# Patient Record
Sex: FEMALE | Race: WHITE | NOT HISPANIC OR LATINO | Employment: FULL TIME | URBAN - NONMETROPOLITAN AREA
[De-identification: names, ages, dates, MRNs, and addresses within clinical notes are randomized per-mention and may not be internally consistent; named-entity substitution may affect disease eponyms.]

---

## 2017-02-19 ENCOUNTER — OFFICE VISIT (OUTPATIENT)
Dept: RETAIL CLINIC | Facility: CLINIC | Age: 67
End: 2017-02-19

## 2017-02-19 VITALS
SYSTOLIC BLOOD PRESSURE: 132 MMHG | WEIGHT: 179 LBS | TEMPERATURE: 99.3 F | HEART RATE: 73 BPM | OXYGEN SATURATION: 99 % | BODY MASS INDEX: 33.79 KG/M2 | DIASTOLIC BLOOD PRESSURE: 76 MMHG | HEIGHT: 61 IN

## 2017-02-19 DIAGNOSIS — R19.7 ACUTE DIARRHEA: Primary | ICD-10-CM

## 2017-02-19 PROCEDURE — 99203 OFFICE O/P NEW LOW 30 MIN: CPT | Performed by: NURSE PRACTITIONER

## 2017-02-19 RX ORDER — LOPERAMIDE HYDROCHLORIDE 2 MG/1
2 CAPSULE ORAL 4 TIMES DAILY PRN
Status: DISCONTINUED | OUTPATIENT
Start: 2017-02-19 | End: 2017-03-13

## 2017-02-19 NOTE — PROGRESS NOTES
"Subjective   Julieta Holt is a 66 y.o. female. Presents to the clinic today with a chief complaint of   Chief Complaint   Patient presents with   • Diarrhea        History of Present Illness     Ms Holt presents with history of diarrhea since Friday.  She reports 5-6 episodes of diarrhea in the last 12 hours.  Denies blood or mucous in her stools.  She denies constant stomach pain.  She reports heartburn and increase flatus.  She denies fever.  She has not taken any antibiotics recently.  She has not taken any medications for her symptoms.  She is eating and drinking normally.      She has a history of colitis however, she states that at her last colonoscopy the doctor indicated that there were no signs of the colitis.      See ROS    The following portions of the patient's history were reviewed and updated as appropriate: allergies, current medications, past family history, past medical history, past social history, past surgical history and problem list.    Review of Systems   Constitutional: Positive for fatigue. Negative for appetite change, chills, diaphoresis and fever.   HENT: Positive for congestion and sinus pressure. Negative for ear discharge, ear pain, hearing loss, postnasal drip, rhinorrhea, sore throat, trouble swallowing and voice change.    Eyes: Negative for redness and itching.   Respiratory: Negative for cough, chest tightness, shortness of breath and wheezing.    Gastrointestinal: Positive for diarrhea (5-6 episodes of diarrhea since midnight) and nausea. Negative for abdominal pain, blood in stool and vomiting.   Skin: Negative for rash.   Neurological: Negative for dizziness.       Objective   Allergies   Allergen Reactions   • Erythromycin      Blood pressure 132/76, pulse 73, temperature 99.3 °F (37.4 °C), height 61\" (154.9 cm), weight 179 lb (81.2 kg), SpO2 99 %.    Physical Exam  General Appearance: Well-appearing, well-developed, well hydrated, no acute distress, alert " and oriented  Mouth/Throat:  Posterior pharynx is pink without drainage or exudates; uvula rises midline; dentition is in good condition and repair and mucous membranes are moist  Neck:  Supple without lymphadenopathy; trachea is midline  Lungs:  Clear to auscultation bilaterally  Heart:   regular rhythm and murmur noted  Abdomen:   Soft, RUQ tenderness, bowel sounds hyperactive, no masses    Assessment/Plan   Julieta was seen today for diarrhea.    Diagnoses and all orders for this visit:    Acute diarrhea  -     loperamide (IMODIUM) capsule 2 mg; Take 1 capsule by mouth 4 (Four) Times a Day As Needed for diarrhea.    Signs and symptoms as well as physical exam findings were discussed with the patient.  Plan of care was completed and patient agreed with the treatment plan. Pt agreed that, if no better in 24 hours, she would contact her PCP for evaluation and treatment.    After visit summary was given.      Patient to follow up if symptoms worsen.               Arabella Castle APRN

## 2017-02-19 NOTE — PATIENT INSTRUCTIONS
Diarrhea  Diarrhea is watery poop (stool). It can make you feel weak, tired, thirsty, or give you a dry mouth (signs of dehydration). Watery poop is a sign of another problem, most often an infection. It often lasts 2-3 days. It can last longer if it is a sign of something serious. Take care of yourself as told by your doctor.  HOME CARE   · Drink 1 cup (8 ounces) of fluid each time you have watery poop.  · Do not drink the following fluids:    Those that contain simple sugars (fructose, glucose, galactose, lactose, sucrose, maltose).    Sports drinks.    Fruit juices.    Whole milk products.    Sodas.    Drinks with caffeine (coffee, tea, soda) or alcohol.  · Oral rehydration solution may be used if the doctor says it is okay. You may make your own solution. Follow this recipe:    ?-? teaspoon table salt.    ¾ teaspoon baking soda.    ? teaspoon salt substitute containing potassium chloride.    1 ? tablespoons sugar.    1 liter (34 ounces) of water.  · Avoid the following foods:    High fiber foods, such as raw fruits and vegetables.    Nuts, seeds, and whole grain breads and cereals.     Those that are sweetened with sugar alcohols (xylitol, sorbitol, mannitol).  · Try eating the following foods:    Starchy foods, such as rice, toast, pasta, low-sugar cereal, oatmeal, baked potatoes, crackers, and bagels.    Bananas.    Applesauce.  · Eat probiotic-rich foods, such as yogurt and milk products that are fermented.  · Wash your hands well after each time you have watery poop.  · Only take medicine as told by your doctor.  · Take a warm bath to help lessen burning or pain from having watery poop.  GET HELP RIGHT AWAY IF:   · You cannot drink fluids without throwing up (vomiting).  · You keep throwing up.  · You have blood in your poop, or your poop looks black and tarry.  · You do not pee (urinate) in 6-8 hours, or there is only a small amount of very dark pee.  · You have belly (abdominal) pain that gets worse or  stays in the same spot (localizes).  · You are weak, dizzy, confused, or light-headed.  · You have a very bad headache.  · Your watery poop gets worse or does not get better.  · You have a fever or lasting symptoms for more than 2-3 days.  · You have a fever and your symptoms suddenly get worse.  MAKE SURE YOU:   · Understand these instructions.  · Will watch your condition.  · Will get help right away if you are not doing well or get worse.     This information is not intended to replace advice given to you by your health care provider. Make sure you discuss any questions you have with your health care provider.     Document Released: 06/05/2009 Document Revised: 01/08/2016 Document Reviewed: 08/23/2016  [employer] Interactive Patient Education ©2016 [employer]    Food Choices to Help Relieve Diarrhea, Adult  When you have diarrhea, the foods you eat and your eating habits are very important. Choosing the right foods and drinks can help relieve diarrhea. Also, because diarrhea can last up to 7 days, you need to replace lost fluids and electrolytes (such as sodium, potassium, and chloride) in order to help prevent dehydration.   WHAT GENERAL GUIDELINES DO I NEED TO FOLLOW?  · Slowly drink 1 cup (8 oz) of fluid for each episode of diarrhea. If you are getting enough fluid, your urine will be clear or pale yellow.  · Eat starchy foods. Some good choices include white rice, white toast, pasta, low-fiber cereal, baked potatoes (without the skin), saltine crackers, and bagels.  · Avoid large servings of any cooked vegetables.  · Limit fruit to two servings per day. A serving is ½ cup or 1 small piece.  · Choose foods with less than 2 g of fiber per serving.  · Limit fats to less than 8 tsp (38 g) per day.  · Avoid fried foods.  · Eat foods that have probiotics in them. Probiotics can be found in certain dairy products.  · Avoid foods and beverages that may increase the speed at which food moves through the stomach and  intestines (gastrointestinal tract). Things to avoid include:  ¨ High-fiber foods, such as dried fruit, raw fruits and vegetables, nuts, seeds, and whole grain foods.  ¨ Spicy foods and high-fat foods.  ¨ Foods and beverages sweetened with high-fructose corn syrup, honey, or sugar alcohols such as xylitol, sorbitol, and mannitol.  WHAT FOODS ARE RECOMMENDED?  Grains  White rice. White, French, or carlos alberto breads (fresh or toasted), including plain rolls, buns, or bagels. White pasta. Saltine, soda, or ang crackers. Pretzels. Low-fiber cereal. Cooked cereals made with water (such as cornmeal, farina, or cream cereals). Plain muffins. Matzo. Arlene toast. Zwieback.   Vegetables  Potatoes (without the skin). Strained tomato and vegetable juices. Most well-cooked and canned vegetables without seeds. Tender lettuce.  Fruits  Cooked or canned applesauce, apricots, cherries, fruit cocktail, grapefruit, peaches, pears, or plums. Fresh bananas, apples without skin, cherries, grapes, cantaloupe, grapefruit, peaches, oranges, or plums.   Meat and Other Protein Products  Baked or boiled chicken. Eggs. Tofu. Fish. Seafood. Smooth peanut butter. Ground or well-cooked tender beef, ham, veal, lamb, pork, or poultry.   Dairy  Plain yogurt, kefir, and unsweetened liquid yogurt. Lactose-free milk, buttermilk, or soy milk. Plain hard cheese.  Beverages  Sport drinks. Clear broths. Diluted fruit juices (except prune). Regular, caffeine-free sodas such as ginger ale. Water. Decaffeinated teas. Oral rehydration solutions. Sugar-free beverages not sweetened with sugar alcohols.  Other  Bouillon, broth, or soups made from recommended foods.   The items listed above may not be a complete list of recommended foods or beverages. Contact your dietitian for more options.  WHAT FOODS ARE NOT RECOMMENDED?  Grains  Whole grain, whole wheat, bran, or rye breads, rolls, pastas, crackers, and cereals. Wild or brown rice. Cereals that contain more than 2  g of fiber per serving. Corn tortillas or taco shells. Cooked or dry oatmeal. Granola. Popcorn.  Vegetables  Raw vegetables. Cabbage, broccoli, Douglas sprouts, artichokes, baked beans, beet greens, corn, kale, legumes, peas, sweet potatoes, and yams. Potato skins. Cooked spinach and cabbage.  Fruits  Dried fruit, including raisins and dates. Raw fruits. Stewed or dried prunes. Fresh apples with skin, apricots, mangoes, pears, raspberries, and strawberries.   Meat and Other Protein Products  Genoa peanut butter. Nuts and seeds. Beans and lentils. Rahman.   Dairy  High-fat cheeses. Milk, chocolate milk, and beverages made with milk, such as milk shakes. Cream. Ice cream.  Sweets and Desserts  Sweet rolls, doughnuts, and sweet breads. Pancakes and waffles.  Fats and Oils  Butter. Cream sauces. Margarine. Salad oils. Plain salad dressings. Olives. Avocados.   Beverages  Caffeinated beverages (such as coffee, tea, soda, or energy drinks). Alcoholic beverages. Fruit juices with pulp. Prune juice. Soft drinks sweetened with high-fructose corn syrup or sugar alcohols.  Other  Coconut. Hot sauce. Chili powder. Mayonnaise. Gravy. Cream-based or milk-based soups.   The items listed above may not be a complete list of foods and beverages to avoid. Contact your dietitian for more information.  WHAT SHOULD I DO IF I BECOME DEHYDRATED?  Diarrhea can sometimes lead to dehydration. Signs of dehydration include dark urine and dry mouth and skin. If you think you are dehydrated, you should rehydrate with an oral rehydration solution. These solutions can be purchased at pharmacies, retail stores, or online.   Drink ½-1 cup (120-240 mL) of oral rehydration solution each time you have an episode of diarrhea. If drinking this amount makes your diarrhea worse, try drinking smaller amounts more often. For example, drink 1-3 tsp (5-15 mL) every 5-10 minutes.   A general rule for staying hydrated is to drink 1½-2 L of fluid per day. Talk to  your health care provider about the specific amount you should be drinking each day. Drink enough fluids to keep your urine clear or pale yellow.     This information is not intended to replace advice given to you by your health care provider. Make sure you discuss any questions you have with your health care provider.     Document Released: 03/09/2005 Document Revised: 01/08/2016 Document Reviewed: 11/10/2014  [employer] Interactive Patient Education ©2016 [employer]

## 2017-03-13 ENCOUNTER — APPOINTMENT (OUTPATIENT)
Dept: PREADMISSION TESTING | Facility: HOSPITAL | Age: 67
End: 2017-03-13

## 2017-03-14 ENCOUNTER — APPOINTMENT (OUTPATIENT)
Dept: GENERAL RADIOLOGY | Facility: HOSPITAL | Age: 67
End: 2017-03-14

## 2017-03-14 ENCOUNTER — ANESTHESIA EVENT (OUTPATIENT)
Dept: PERIOP | Facility: HOSPITAL | Age: 67
End: 2017-03-14

## 2017-03-14 ENCOUNTER — HOSPITAL ENCOUNTER (OUTPATIENT)
Facility: HOSPITAL | Age: 67
Setting detail: HOSPITAL OUTPATIENT SURGERY
Discharge: HOME | End: 2017-03-14
Attending: SURGERY | Admitting: SURGERY

## 2017-03-14 ENCOUNTER — ANESTHESIA (OUTPATIENT)
Dept: PERIOP | Facility: HOSPITAL | Age: 67
End: 2017-03-14

## 2017-03-14 VITALS
SYSTOLIC BLOOD PRESSURE: 142 MMHG | DIASTOLIC BLOOD PRESSURE: 67 MMHG | OXYGEN SATURATION: 98 % | TEMPERATURE: 97.6 F | RESPIRATION RATE: 18 BRPM | WEIGHT: 179 LBS | HEART RATE: 87 BPM | BODY MASS INDEX: 33.79 KG/M2 | HEIGHT: 61 IN

## 2017-03-14 DIAGNOSIS — K80.20 CHOLELITHIASES: ICD-10-CM

## 2017-03-14 PROCEDURE — 76000 FLUOROSCOPY <1 HR PHYS/QHP: CPT

## 2017-03-14 PROCEDURE — 74300 X-RAY BILE DUCTS/PANCREAS: CPT | Performed by: RADIOLOGY

## 2017-03-14 PROCEDURE — C1726 CATH, BAL DIL, NON-VASCULAR: HCPCS | Performed by: SURGERY

## 2017-03-14 PROCEDURE — 25010000002 PROPOFOL 10 MG/ML EMULSION: Performed by: NURSE ANESTHETIST, CERTIFIED REGISTERED

## 2017-03-14 PROCEDURE — 25010000002 FENTANYL CITRATE (PF) 100 MCG/2ML SOLUTION: Performed by: NURSE ANESTHETIST, CERTIFIED REGISTERED

## 2017-03-14 PROCEDURE — 25010000002 HYDROMORPHONE PER 4 MG: Performed by: NURSE ANESTHETIST, CERTIFIED REGISTERED

## 2017-03-14 PROCEDURE — 0 IOPAMIDOL 61 % SOLUTION: Performed by: SURGERY

## 2017-03-14 PROCEDURE — 25010000002 KETOROLAC TROMETHAMINE PER 15 MG: Performed by: NURSE ANESTHETIST, CERTIFIED REGISTERED

## 2017-03-14 PROCEDURE — 25010000002 ONDANSETRON PER 1 MG: Performed by: NURSE ANESTHETIST, CERTIFIED REGISTERED

## 2017-03-14 PROCEDURE — 25010000002 MIDAZOLAM PER 1 MG: Performed by: NURSE ANESTHETIST, CERTIFIED REGISTERED

## 2017-03-14 RX ORDER — BUPIVACAINE HYDROCHLORIDE AND EPINEPHRINE 2.5; 5 MG/ML; UG/ML
INJECTION, SOLUTION EPIDURAL; INFILTRATION; INTRACAUDAL; PERINEURAL AS NEEDED
Status: DISCONTINUED | OUTPATIENT
Start: 2017-03-14 | End: 2017-03-14 | Stop reason: HOSPADM

## 2017-03-14 RX ORDER — ONDANSETRON 2 MG/ML
4 INJECTION INTRAMUSCULAR; INTRAVENOUS ONCE AS NEEDED
Status: DISCONTINUED | OUTPATIENT
Start: 2017-03-14 | End: 2017-03-14 | Stop reason: HOSPADM

## 2017-03-14 RX ORDER — SODIUM CHLORIDE 9 MG/ML
INJECTION, SOLUTION INTRAVENOUS AS NEEDED
Status: DISCONTINUED | OUTPATIENT
Start: 2017-03-14 | End: 2017-03-14 | Stop reason: HOSPADM

## 2017-03-14 RX ORDER — KETOROLAC TROMETHAMINE 30 MG/ML
INJECTION, SOLUTION INTRAMUSCULAR; INTRAVENOUS AS NEEDED
Status: DISCONTINUED | OUTPATIENT
Start: 2017-03-14 | End: 2017-03-14 | Stop reason: SURG

## 2017-03-14 RX ORDER — FENTANYL CITRATE 50 UG/ML
50 INJECTION, SOLUTION INTRAMUSCULAR; INTRAVENOUS
Status: DISCONTINUED | OUTPATIENT
Start: 2017-03-14 | End: 2017-03-14 | Stop reason: HOSPADM

## 2017-03-14 RX ORDER — HYDROMORPHONE HCL 110MG/55ML
PATIENT CONTROLLED ANALGESIA SYRINGE INTRAVENOUS AS NEEDED
Status: DISCONTINUED | OUTPATIENT
Start: 2017-03-14 | End: 2017-03-14 | Stop reason: SURG

## 2017-03-14 RX ORDER — HYDROCODONE BITARTRATE AND ACETAMINOPHEN 7.5; 325 MG/1; MG/1
1 TABLET ORAL EVERY 6 HOURS PRN
Qty: 24 TABLET | Refills: 0 | Status: SHIPPED | OUTPATIENT
Start: 2017-03-14 | End: 2017-03-24

## 2017-03-14 RX ORDER — PROPOFOL 10 MG/ML
VIAL (ML) INTRAVENOUS AS NEEDED
Status: DISCONTINUED | OUTPATIENT
Start: 2017-03-14 | End: 2017-03-14 | Stop reason: SURG

## 2017-03-14 RX ORDER — ROCURONIUM BROMIDE 10 MG/ML
INJECTION, SOLUTION INTRAVENOUS AS NEEDED
Status: DISCONTINUED | OUTPATIENT
Start: 2017-03-14 | End: 2017-03-14 | Stop reason: SURG

## 2017-03-14 RX ORDER — OXYCODONE HYDROCHLORIDE AND ACETAMINOPHEN 5; 325 MG/1; MG/1
1 TABLET ORAL ONCE AS NEEDED
Status: DISCONTINUED | OUTPATIENT
Start: 2017-03-14 | End: 2017-03-14 | Stop reason: HOSPADM

## 2017-03-14 RX ORDER — LIDOCAINE HYDROCHLORIDE 20 MG/ML
INJECTION, SOLUTION INFILTRATION; PERINEURAL AS NEEDED
Status: DISCONTINUED | OUTPATIENT
Start: 2017-03-14 | End: 2017-03-14 | Stop reason: SURG

## 2017-03-14 RX ORDER — ONDANSETRON 2 MG/ML
INJECTION INTRAMUSCULAR; INTRAVENOUS AS NEEDED
Status: DISCONTINUED | OUTPATIENT
Start: 2017-03-14 | End: 2017-03-14 | Stop reason: SURG

## 2017-03-14 RX ORDER — IPRATROPIUM BROMIDE AND ALBUTEROL SULFATE 2.5; .5 MG/3ML; MG/3ML
3 SOLUTION RESPIRATORY (INHALATION) ONCE AS NEEDED
Status: DISCONTINUED | OUTPATIENT
Start: 2017-03-14 | End: 2017-03-14 | Stop reason: HOSPADM

## 2017-03-14 RX ORDER — MAGNESIUM HYDROXIDE 1200 MG/15ML
LIQUID ORAL AS NEEDED
Status: DISCONTINUED | OUTPATIENT
Start: 2017-03-14 | End: 2017-03-14 | Stop reason: HOSPADM

## 2017-03-14 RX ORDER — SODIUM CHLORIDE, SODIUM LACTATE, POTASSIUM CHLORIDE, CALCIUM CHLORIDE 600; 310; 30; 20 MG/100ML; MG/100ML; MG/100ML; MG/100ML
125 INJECTION, SOLUTION INTRAVENOUS CONTINUOUS
Status: DISCONTINUED | OUTPATIENT
Start: 2017-03-14 | End: 2017-03-14 | Stop reason: HOSPADM

## 2017-03-14 RX ORDER — MEPERIDINE HYDROCHLORIDE 25 MG/ML
12.5 INJECTION INTRAMUSCULAR; INTRAVENOUS; SUBCUTANEOUS
Status: DISCONTINUED | OUTPATIENT
Start: 2017-03-14 | End: 2017-03-14 | Stop reason: HOSPADM

## 2017-03-14 RX ORDER — FENTANYL CITRATE 50 UG/ML
INJECTION, SOLUTION INTRAMUSCULAR; INTRAVENOUS AS NEEDED
Status: DISCONTINUED | OUTPATIENT
Start: 2017-03-14 | End: 2017-03-14 | Stop reason: SURG

## 2017-03-14 RX ORDER — SODIUM CHLORIDE 0.9 % (FLUSH) 0.9 %
1-10 SYRINGE (ML) INJECTION AS NEEDED
Status: DISCONTINUED | OUTPATIENT
Start: 2017-03-14 | End: 2017-03-14 | Stop reason: HOSPADM

## 2017-03-14 RX ORDER — MIDAZOLAM HYDROCHLORIDE 1 MG/ML
INJECTION INTRAMUSCULAR; INTRAVENOUS AS NEEDED
Status: DISCONTINUED | OUTPATIENT
Start: 2017-03-14 | End: 2017-03-14 | Stop reason: SURG

## 2017-03-14 RX ADMIN — EPHEDRINE SULFATE 10 MG: 50 INJECTION INTRAMUSCULAR; INTRAVENOUS; SUBCUTANEOUS at 10:29

## 2017-03-14 RX ADMIN — SODIUM CHLORIDE, POTASSIUM CHLORIDE, SODIUM LACTATE AND CALCIUM CHLORIDE: 600; 310; 30; 20 INJECTION, SOLUTION INTRAVENOUS at 10:42

## 2017-03-14 RX ADMIN — ONDANSETRON 4 MG: 2 INJECTION, SOLUTION INTRAMUSCULAR; INTRAVENOUS at 10:22

## 2017-03-14 RX ADMIN — SODIUM CHLORIDE, POTASSIUM CHLORIDE, SODIUM LACTATE AND CALCIUM CHLORIDE 125 ML/HR: 600; 310; 30; 20 INJECTION, SOLUTION INTRAVENOUS at 09:32

## 2017-03-14 RX ADMIN — MIDAZOLAM HYDROCHLORIDE 2 MG: 1 INJECTION, SOLUTION INTRAMUSCULAR; INTRAVENOUS at 10:01

## 2017-03-14 RX ADMIN — PROPOFOL 150 MG: 10 INJECTION, EMULSION INTRAVENOUS at 10:06

## 2017-03-14 RX ADMIN — PROPOFOL 50 MG: 10 INJECTION, EMULSION INTRAVENOUS at 10:24

## 2017-03-14 RX ADMIN — HYDROMORPHONE HYDROCHLORIDE 0.5 MG: 2 INJECTION, SOLUTION INTRAMUSCULAR; INTRAVENOUS; SUBCUTANEOUS at 10:50

## 2017-03-14 RX ADMIN — CEFAZOLIN 1 G: 1 INJECTION, POWDER, FOR SOLUTION INTRAMUSCULAR; INTRAVENOUS; PARENTERAL at 10:57

## 2017-03-14 RX ADMIN — ROCURONIUM BROMIDE 15 MG: 10 INJECTION INTRAVENOUS at 10:06

## 2017-03-14 RX ADMIN — ROCURONIUM BROMIDE 5 MG: 10 INJECTION INTRAVENOUS at 10:24

## 2017-03-14 RX ADMIN — KETOROLAC TROMETHAMINE 30 MG: 30 INJECTION, SOLUTION INTRAMUSCULAR; INTRAVENOUS at 10:27

## 2017-03-14 RX ADMIN — FENTANYL CITRATE 100 MCG: 50 INJECTION INTRAMUSCULAR; INTRAVENOUS at 10:06

## 2017-03-14 RX ADMIN — LIDOCAINE HYDROCHLORIDE 60 MG: 20 INJECTION, SOLUTION INFILTRATION; PERINEURAL at 10:06

## 2017-03-14 NOTE — ANESTHESIA PROCEDURE NOTES
Airway  Urgency: elective    Date/Time: 3/14/2017 10:08 AM  Airway not difficult    General Information and Staff    Patient location during procedure: OR  CRNA: TANNA CAMP    Indications and Patient Condition  Indications for airway management: airway protection    Preoxygenated: yes  MILS maintained throughout  Mask difficulty assessment: 0 - not attempted    Final Airway Details  Final airway type: endotracheal airway      Successful airway: ETT  Cuffed: yes   Successful intubation technique: direct laryngoscopy  Endotracheal tube insertion site: oral  Blade: Koo  Blade size: #2  ETT size: 7.5 mm  Cormack-Lehane Classification: grade I - full view of glottis  Placement verified by: chest auscultation and capnometry   Measured from: lips  ETT to lips (cm): 21  Number of attempts at approach: 1

## 2017-03-14 NOTE — ANESTHESIA PREPROCEDURE EVALUATION
Anesthesia Evaluation     Patient summary reviewed and Nursing notes reviewed   no history of anesthetic complications:     Airway   Mallampati: II  TM distance: >3 FB  Neck ROM: limited  possible difficult intubation  Dental - normal exam     Pulmonary - negative pulmonary ROS and normal exam   Cardiovascular - normal exam  Exercise tolerance: good (4-7 METS)    NYHA Classification: II    (+) valvular problems/murmurs,       Neuro/Psych  (+) seizures, psychiatric history,    GI/Hepatic/Renal/Endo - negative ROS     Musculoskeletal     Abdominal  - normal exam    Bowel sounds: normal.   Substance History - negative use     OB/GYN negative ob/gyn ROS         Other   (+) arthritis                                 Anesthesia Plan    ASA 2     general     intravenous induction   Anesthetic plan and risks discussed with patient.    Plan discussed with CRNA.

## 2017-03-14 NOTE — ANESTHESIA POSTPROCEDURE EVALUATION
Patient: Julieta Holt    Procedure Summary     Date Anesthesia Start Anesthesia Stop Room / Location    03/14/17 1002 1114 [facility] OR 02 / [facility]       Procedure Diagnosis Surgeon Provider    CHOLECYSTECTOMY LAPAROSCOPIC INTRAOPERATIVE CHOLANGIOGRAM (N/A Abdomen) No diagnosis on file. MD Maximiliano Stevens,           Anesthesia Type: general  Last vitals  /68 (03/14/17 1146)    Temp 97.6 °F (36.4 °C) (03/14/17 1146)    Pulse 85 (03/14/17 1146)   Resp 16 (03/14/17 1146)    SpO2 98 % (03/14/17 1146)      Post Anesthesia Care and Evaluation    Patient location during evaluation: PHASE II  Patient participation: complete - patient participated  Level of consciousness: awake and alert  Pain score: 1  Pain management: adequate  Airway patency: patent  Anesthetic complications: No anesthetic complications  PONV Status: controlled  Cardiovascular status: acceptable  Respiratory status: acceptable  Hydration status: acceptable
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
## 2017-03-14 NOTE — PLAN OF CARE
Problem: Patient Care Overview (Adult)  Goal: Discharge Needs Assessment  Outcome: Ongoing (interventions implemented as appropriate)    03/14/17 0916   Discharge Needs Assessment   Concerns To Be Addressed no discharge needs identified   Readmission Within The Last 30 Days no previous admission in last 30 days   Equipment Needed After Discharge none   Current Health   Anticipated Changes Related to Illness none   Living Environment   Transportation Available car

## 2017-03-14 NOTE — OP NOTE
Procedure Note  [facility]    Surgeon: Chris Quick MD    Pre-op Dx:  Symptomatic gallstones  Post-op Dx:  same  Procedure laparoscopic cholecystectomy with intraoperative cholangiogram    Indications: Chronic cholecystitis due to the stones         Surgeon: Chris Quick MD     Assistants: Nicole Valdez    Anesthesia: General endotracheal anesthesia    ASA Class: 2          Findings:  Gallstones and acute and chronic inflammation    Estimated Blood Loss:  * No values recorded between 3/14/2017 10:03 AM and 3/14/2017 11:04 AM *           Drains: None                   Specimens:   ID Type Source Tests Collected by Time Destination   A : GALLBLADDER Tissue Gallbladder TISSUE EXAM Chris Quick MD 3/14/2017 1049               Implants and Grafts: * No implants in log *           Complications:  None    Details of operation:  After satisfactory general endotracheal anesthesia was obtained, the abdomen was prepped and draped in the usual fashion.  A subumbilical incision was made and a varies needle was introduced into the abdominal cavity.  3 L of CO2 gas were insufflated and a 5 mm port was placed.  5 mm ports were introduced subcostally at the anterior axillary line and the midclavicular line.  A 5 mm port was placed in the subxiphoid position.  Adhesions were lysed and the cystic duct was dissected out.  A negative intraoperative cholangiogram was obtained and the cystic duct and cystic artery were doubly hemoclipped and divided.  The gallbladder was dissected from the gallbladder fossa and removed through the subxiphoid position.  The subxiphoid fascia was closed with 2-0 PDS suture and then all air and all instruments were removed.  Incisions were closed with 4-0 Prolene and the wounds were injected with Marcaine.  Sterile dressings were applied.           Disposition: PACU - hemodynamically stable.           Condition: stable     C.C.:  Dr. Michael Alcala and Dr. Cooper Quick

## 2017-03-14 NOTE — PLAN OF CARE
Problem: Patient Care Overview (Adult)  Goal: Plan of Care Review  Outcome: Ongoing (interventions implemented as appropriate)    03/14/17 0957   Coping/Psychosocial Response Interventions   Plan Of Care Reviewed With patient   Patient Care Overview   Progress progress toward functional goals as expected

## 2017-03-14 NOTE — PLAN OF CARE
Problem: Perioperative Period (Adult)  Goal: Signs and Symptoms of Listed Potential Problems Will be Absent or Manageable (Perioperative Period)  Outcome: Ongoing (interventions implemented as appropriate)    03/14/17 0900   Perioperative Period   Problems Assessed (Perioperative Period) pain   Problems Present (Perioperative Period) pain

## 2017-03-17 LAB
LAB AP CASE REPORT: NORMAL
Lab: NORMAL
PATH REPORT.FINAL DX SPEC: NORMAL

## 2017-09-15 ENCOUNTER — HOSPITAL ENCOUNTER (EMERGENCY)
Facility: HOSPITAL | Age: 67
Discharge: HOME | End: 2017-09-15
Attending: EMERGENCY MEDICINE | Admitting: EMERGENCY MEDICINE

## 2017-09-15 ENCOUNTER — APPOINTMENT (OUTPATIENT)
Dept: GENERAL RADIOLOGY | Facility: HOSPITAL | Age: 67
End: 2017-09-15

## 2017-09-15 DIAGNOSIS — R07.89 ATYPICAL CHEST PAIN: Primary | ICD-10-CM

## 2017-09-15 LAB
ALBUMIN SERPL-MCNC: 4.4 G/DL (ref 3.4–4.8)
ALBUMIN/GLOB SERPL: 1.7 G/DL (ref 1.5–2.5)
ALP SERPL-CCNC: 125 U/L (ref 35–104)
ALT SERPL W P-5'-P-CCNC: 15 U/L (ref 10–36)
ANION GAP SERPL CALCULATED.3IONS-SCNC: 6.3 MMOL/L (ref 3.6–11.2)
AST SERPL-CCNC: 18 U/L (ref 10–30)
BASOPHILS # BLD AUTO: 0.02 10*3/MM3 (ref 0–0.3)
BASOPHILS NFR BLD AUTO: 0.2 % (ref 0–2)
BILIRUB SERPL-MCNC: 0.2 MG/DL (ref 0.2–1.8)
BUN BLD-MCNC: 24 MG/DL (ref 7–21)
BUN/CREAT SERPL: 30.8 (ref 7–25)
CALCIUM SPEC-SCNC: 9.8 MG/DL (ref 7.7–10)
CHLORIDE SERPL-SCNC: 110 MMOL/L (ref 99–112)
CO2 SERPL-SCNC: 26.7 MMOL/L (ref 24.3–31.9)
CREAT BLD-MCNC: 0.78 MG/DL (ref 0.43–1.29)
D DIMER PPP FEU-MCNC: 0.37 MCGFEU/ML (ref 0–0.5)
DEPRECATED RDW RBC AUTO: 44 FL (ref 37–54)
EOSINOPHIL # BLD AUTO: 0.14 10*3/MM3 (ref 0–0.7)
EOSINOPHIL NFR BLD AUTO: 1.6 % (ref 0–7)
ERYTHROCYTE [DISTWIDTH] IN BLOOD BY AUTOMATED COUNT: 13.7 % (ref 11.5–14.5)
GFR SERPL CREATININE-BSD FRML MDRD: 74 ML/MIN/1.73
GLOBULIN UR ELPH-MCNC: 2.6 GM/DL
GLUCOSE BLD-MCNC: 116 MG/DL (ref 70–110)
HCT VFR BLD AUTO: 40.1 % (ref 37–47)
HGB BLD-MCNC: 12.6 G/DL (ref 12–16)
IMM GRANULOCYTES # BLD: 0.03 10*3/MM3 (ref 0–0.03)
IMM GRANULOCYTES NFR BLD: 0.3 % (ref 0–0.5)
LYMPHOCYTES # BLD AUTO: 2.39 10*3/MM3 (ref 1–3)
LYMPHOCYTES NFR BLD AUTO: 27.8 % (ref 16–46)
MCH RBC QN AUTO: 27.6 PG (ref 27–33)
MCHC RBC AUTO-ENTMCNC: 31.4 G/DL (ref 33–37)
MCV RBC AUTO: 87.7 FL (ref 80–94)
MONOCYTES # BLD AUTO: 0.69 10*3/MM3 (ref 0.1–0.9)
MONOCYTES NFR BLD AUTO: 8 % (ref 0–12)
NEUTROPHILS # BLD AUTO: 5.32 10*3/MM3 (ref 1.4–6.5)
NEUTROPHILS NFR BLD AUTO: 62.1 % (ref 40–75)
OSMOLALITY SERPL CALC.SUM OF ELEC: 290 MOSM/KG (ref 273–305)
PLATELET # BLD AUTO: 276 10*3/MM3 (ref 130–400)
PMV BLD AUTO: 11 FL (ref 6–10)
POTASSIUM BLD-SCNC: 4 MMOL/L (ref 3.5–5.3)
PROT SERPL-MCNC: 7 G/DL (ref 6–8)
RBC # BLD AUTO: 4.57 10*6/MM3 (ref 4.2–5.4)
SODIUM BLD-SCNC: 143 MMOL/L (ref 135–153)
TROPONIN I SERPL-MCNC: <0.006 NG/ML
TROPONIN I SERPL-MCNC: <0.006 NG/ML
WBC NRBC COR # BLD: 8.59 10*3/MM3 (ref 4.5–12.5)

## 2017-09-15 PROCEDURE — 85025 COMPLETE CBC W/AUTO DIFF WBC: CPT | Performed by: EMERGENCY MEDICINE

## 2017-09-15 PROCEDURE — 71010 XR CHEST 1 VW: CPT | Performed by: RADIOLOGY

## 2017-09-15 PROCEDURE — 71010 HC CHEST PA OR AP: CPT

## 2017-09-15 PROCEDURE — 93005 ELECTROCARDIOGRAM TRACING: CPT | Performed by: EMERGENCY MEDICINE

## 2017-09-15 PROCEDURE — 80053 COMPREHEN METABOLIC PANEL: CPT | Performed by: EMERGENCY MEDICINE

## 2017-09-15 PROCEDURE — 36415 COLL VENOUS BLD VENIPUNCTURE: CPT

## 2017-09-15 PROCEDURE — 93010 ELECTROCARDIOGRAM REPORT: CPT | Performed by: INTERNAL MEDICINE

## 2017-09-15 PROCEDURE — 84484 ASSAY OF TROPONIN QUANT: CPT | Performed by: EMERGENCY MEDICINE

## 2017-09-15 PROCEDURE — 99284 EMERGENCY DEPT VISIT MOD MDM: CPT

## 2017-09-15 PROCEDURE — 85379 FIBRIN DEGRADATION QUANT: CPT | Performed by: EMERGENCY MEDICINE

## 2017-09-15 RX ORDER — SODIUM CHLORIDE 0.9 % (FLUSH) 0.9 %
10 SYRINGE (ML) INJECTION AS NEEDED
Status: DISCONTINUED | OUTPATIENT
Start: 2017-09-15 | End: 2017-09-16 | Stop reason: HOSPADM

## 2017-09-16 VITALS
TEMPERATURE: 98.5 F | HEART RATE: 79 BPM | OXYGEN SATURATION: 96 % | SYSTOLIC BLOOD PRESSURE: 143 MMHG | HEIGHT: 60 IN | WEIGHT: 140 LBS | RESPIRATION RATE: 18 BRPM | DIASTOLIC BLOOD PRESSURE: 77 MMHG | BODY MASS INDEX: 27.48 KG/M2
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
## 2017-09-16 NOTE — DISCHARGE INSTRUCTIONS

## 2017-10-25 ENCOUNTER — OFFICE VISIT (OUTPATIENT)
Dept: CARDIOLOGY | Facility: CLINIC | Age: 67
End: 2017-10-25

## 2017-10-25 VITALS
WEIGHT: 161.2 LBS | BODY MASS INDEX: 31.65 KG/M2 | HEIGHT: 60 IN | DIASTOLIC BLOOD PRESSURE: 70 MMHG | RESPIRATION RATE: 16 BRPM | SYSTOLIC BLOOD PRESSURE: 119 MMHG | HEART RATE: 91 BPM

## 2017-10-25 DIAGNOSIS — R07.2 PRECORDIAL PAIN: Primary | ICD-10-CM

## 2017-10-25 DIAGNOSIS — R06.02 SOB (SHORTNESS OF BREATH): ICD-10-CM

## 2017-10-25 DIAGNOSIS — E78.5 DYSLIPIDEMIA: ICD-10-CM

## 2017-10-25 DIAGNOSIS — R00.2 PALPITATIONS: ICD-10-CM

## 2017-10-25 PROCEDURE — 99204 OFFICE O/P NEW MOD 45 MIN: CPT | Performed by: INTERNAL MEDICINE

## 2017-10-25 PROCEDURE — 93000 ELECTROCARDIOGRAM COMPLETE: CPT | Performed by: INTERNAL MEDICINE
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
## 2017-10-26 ENCOUNTER — TELEPHONE (OUTPATIENT)
Dept: CARDIOLOGY | Facility: CLINIC | Age: 67
End: 2017-10-26

## 2017-10-26 NOTE — TELEPHONE ENCOUNTER
----- Message from Ortega Parker MD sent at 10/25/2017  8:40 AM EDT -----   Please obtain any old EKG's from [facility].      Called and requested her EKG's.

## 2017-11-10 ENCOUNTER — HOSPITAL ENCOUNTER (OUTPATIENT)
Dept: CARDIOLOGY | Facility: HOSPITAL | Age: 67
Discharge: HOME | End: 2017-11-10
Attending: INTERNAL MEDICINE

## 2017-11-10 ENCOUNTER — HOSPITAL ENCOUNTER (OUTPATIENT)
Dept: NUCLEAR MEDICINE | Facility: HOSPITAL | Age: 67
Discharge: HOME | End: 2017-11-10
Attending: INTERNAL MEDICINE

## 2017-11-10 DIAGNOSIS — R07.2 PRECORDIAL PAIN: ICD-10-CM

## 2017-11-10 DIAGNOSIS — R00.2 PALPITATIONS: ICD-10-CM

## 2017-11-10 LAB
BH CV ECHO MEAS - ACS: 2.1 CM
BH CV ECHO MEAS - AO ROOT AREA (BSA CORRECTED): 2
BH CV ECHO MEAS - AO ROOT AREA: 9.3 CM^2
BH CV ECHO MEAS - AO ROOT DIAM: 3.4 CM
BH CV ECHO MEAS - BSA(HAYCOCK): 1.8 M^2
BH CV ECHO MEAS - BSA: 1.7 M^2
BH CV ECHO MEAS - BZI_BMI: 31.4 KILOGRAMS/M^2
BH CV ECHO MEAS - BZI_METRIC_HEIGHT: 152.4 CM
BH CV ECHO MEAS - BZI_METRIC_WEIGHT: 73 KG
BH CV ECHO MEAS - CONTRAST EF 4CH: 46.5 ML/M^2
BH CV ECHO MEAS - EDV(CUBED): 98.6 ML
BH CV ECHO MEAS - EDV(MOD-SP4): 86 ML
BH CV ECHO MEAS - EDV(TEICH): 98.3 ML
BH CV ECHO MEAS - EF(CUBED): 59 %
BH CV ECHO MEAS - EF(MOD-SP4): 46.5 %
BH CV ECHO MEAS - EF(TEICH): 50.6 %
BH CV ECHO MEAS - ESV(CUBED): 40.4 ML
BH CV ECHO MEAS - ESV(MOD-SP4): 46 ML
BH CV ECHO MEAS - ESV(TEICH): 48.5 ML
BH CV ECHO MEAS - FS: 25.7 %
BH CV ECHO MEAS - IVS/LVPW: 1
BH CV ECHO MEAS - IVSD: 1.2 CM
BH CV ECHO MEAS - LA DIMENSION: 3.7 CM
BH CV ECHO MEAS - LA/AO: 1.1
BH CV ECHO MEAS - LV DIASTOLIC VOL/BSA (35-75): 50.5 ML/M^2
BH CV ECHO MEAS - LV MASS(C)D: 188.8 GRAMS
BH CV ECHO MEAS - LV MASS(C)DI: 110.9 GRAMS/M^2
BH CV ECHO MEAS - LV SYSTOLIC VOL/BSA (12-30): 27 ML/M^2
BH CV ECHO MEAS - LVIDD: 4.6 CM
BH CV ECHO MEAS - LVIDS: 3.4 CM
BH CV ECHO MEAS - LVLD AP4: 7.9 CM
BH CV ECHO MEAS - LVLS AP4: 7 CM
BH CV ECHO MEAS - LVOT AREA (M): 2.5 CM^2
BH CV ECHO MEAS - LVOT AREA: 2.6 CM^2
BH CV ECHO MEAS - LVOT DIAM: 1.8 CM
BH CV ECHO MEAS - LVPWD: 1.1 CM
BH CV ECHO MEAS - MV A MAX VEL: 123.9 CM/SEC
BH CV ECHO MEAS - MV E MAX VEL: 76 CM/SEC
BH CV ECHO MEAS - MV E/A: 0.61
BH CV ECHO MEAS - PA ACC SLOPE: 1179 CM/SEC^2
BH CV ECHO MEAS - PA ACC TIME: 0.1 SEC
BH CV ECHO MEAS - PA PR(ACCEL): 34.6 MMHG
BH CV ECHO MEAS - RAP SYSTOLE: 10 MMHG
BH CV ECHO MEAS - RVDD: 2.3 CM
BH CV ECHO MEAS - RVSP: 35 MMHG
BH CV ECHO MEAS - SI(CUBED): 34.1 ML/M^2
BH CV ECHO MEAS - SI(MOD-SP4): 23.5 ML/M^2
BH CV ECHO MEAS - SI(TEICH): 29.2 ML/M^2
BH CV ECHO MEAS - SV(CUBED): 58.1 ML
BH CV ECHO MEAS - SV(MOD-SP4): 40 ML
BH CV ECHO MEAS - SV(TEICH): 49.8 ML
BH CV ECHO MEAS - TR MAX VEL: 249.8 CM/SEC
BH CV NUCLEAR PRIOR STUDY: 3
BH CV STRESS BP STAGE 1: NORMAL
BH CV STRESS BP STAGE 2: NORMAL
BH CV STRESS COMMENTS STAGE 1: NORMAL
BH CV STRESS COMMENTS STAGE 2: NORMAL
BH CV STRESS DOSE REGADENOSON STAGE 1: 0.4
BH CV STRESS DURATION MIN STAGE 1: 0
BH CV STRESS DURATION MIN STAGE 2: 4
BH CV STRESS DURATION SEC STAGE 1: 15
BH CV STRESS DURATION SEC STAGE 2: 0
BH CV STRESS HR STAGE 1: 112
BH CV STRESS HR STAGE 2: 92
BH CV STRESS PROTOCOL 1: NORMAL
BH CV STRESS RECOVERY BP: NORMAL MMHG
BH CV STRESS RECOVERY HR: 92 BPM
BH CV STRESS STAGE 1: 1
BH CV STRESS STAGE 2: 2
LV EF NUC BP: 59 %
MAXIMAL PREDICTED HEART RATE: 153 BPM
MAXIMAL PREDICTED HEART RATE: 153 BPM
PERCENT MAX PREDICTED HR: 73.2 %
STRESS BASELINE BP: NORMAL MMHG
STRESS BASELINE HR: 83 BPM
STRESS PERCENT HR: 86 %
STRESS POST PEAK BP: NORMAL MMHG
STRESS POST PEAK HR: 112 BPM
STRESS TARGET HR: 130 BPM
STRESS TARGET HR: 130 BPM

## 2017-11-10 PROCEDURE — 25010000002 REGADENOSON 0.4 MG/5ML SOLUTION: Performed by: INTERNAL MEDICINE

## 2017-11-10 PROCEDURE — A9500 TC99M SESTAMIBI: HCPCS | Performed by: INTERNAL MEDICINE

## 2017-11-10 PROCEDURE — 93018 CV STRESS TEST I&R ONLY: CPT | Performed by: INTERNAL MEDICINE

## 2017-11-10 PROCEDURE — 78452 HT MUSCLE IMAGE SPECT MULT: CPT | Performed by: INTERNAL MEDICINE

## 2017-11-10 PROCEDURE — 0 TECHNETIUM SESTAMIBI: Performed by: INTERNAL MEDICINE

## 2017-11-10 PROCEDURE — 93306 TTE W/DOPPLER COMPLETE: CPT | Performed by: INTERNAL MEDICINE

## 2017-11-10 PROCEDURE — 93306 TTE W/DOPPLER COMPLETE: CPT

## 2017-11-10 PROCEDURE — 93017 CV STRESS TEST TRACING ONLY: CPT

## 2017-11-10 PROCEDURE — 78452 HT MUSCLE IMAGE SPECT MULT: CPT

## 2017-11-10 RX ADMIN — TECHNETIUM TC-99M SESTAMIBI 1 DOSE: 1 INJECTION INTRAVENOUS at 08:40

## 2017-11-10 RX ADMIN — TECHNETIUM TC-99M SESTAMIBI 1 DOSE: 1 INJECTION INTRAVENOUS at 10:48

## 2017-11-10 RX ADMIN — REGADENOSON 0.4 MG: 0.08 INJECTION, SOLUTION INTRAVENOUS at 10:48

## 2017-11-13 ENCOUNTER — TELEPHONE (OUTPATIENT)
Dept: CARDIOLOGY | Facility: CLINIC | Age: 67
End: 2017-11-13

## 2017-11-13 NOTE — TELEPHONE ENCOUNTER
Echocardiogram showed mildly decreased ejection fraction.  Stress test showed evidence of an old infarct with no evidence of new ischemia.

## 2017-11-17 ENCOUNTER — HOSPITAL ENCOUNTER (EMERGENCY)
Facility: HOSPITAL | Age: 67
Discharge: HOME | End: 2017-11-18
Attending: EMERGENCY MEDICINE | Admitting: EMERGENCY MEDICINE

## 2017-11-17 ENCOUNTER — APPOINTMENT (OUTPATIENT)
Dept: GENERAL RADIOLOGY | Facility: HOSPITAL | Age: 67
End: 2017-11-17

## 2017-11-17 DIAGNOSIS — R07.9 CHEST PAIN, UNSPECIFIED TYPE: Primary | ICD-10-CM

## 2017-11-17 LAB
ALBUMIN SERPL-MCNC: 4.8 G/DL (ref 3.4–4.8)
ALBUMIN/GLOB SERPL: 1.8 G/DL (ref 1.5–2.5)
ALP SERPL-CCNC: 115 U/L (ref 35–104)
ALT SERPL W P-5'-P-CCNC: 19 U/L (ref 10–36)
ANION GAP SERPL CALCULATED.3IONS-SCNC: 9 MMOL/L (ref 3.6–11.2)
AST SERPL-CCNC: 35 U/L (ref 10–30)
BASOPHILS # BLD AUTO: 0.03 10*3/MM3 (ref 0–0.3)
BASOPHILS NFR BLD AUTO: 0.3 % (ref 0–2)
BILIRUB SERPL-MCNC: 0.6 MG/DL (ref 0.2–1.8)
BUN BLD-MCNC: 11 MG/DL (ref 7–21)
BUN/CREAT SERPL: 14.9 (ref 7–25)
CALCIUM SPEC-SCNC: 9.9 MG/DL (ref 7.7–10)
CHLORIDE SERPL-SCNC: 107 MMOL/L (ref 99–112)
CO2 SERPL-SCNC: 23 MMOL/L (ref 24.3–31.9)
CREAT BLD-MCNC: 0.74 MG/DL (ref 0.43–1.29)
DEPRECATED RDW RBC AUTO: 42.7 FL (ref 37–54)
EOSINOPHIL # BLD AUTO: 0.14 10*3/MM3 (ref 0–0.7)
EOSINOPHIL NFR BLD AUTO: 1.6 % (ref 0–7)
ERYTHROCYTE [DISTWIDTH] IN BLOOD BY AUTOMATED COUNT: 13.5 % (ref 11.5–14.5)
GFR SERPL CREATININE-BSD FRML MDRD: 78 ML/MIN/1.73
GLOBULIN UR ELPH-MCNC: 2.6 GM/DL
GLUCOSE BLD-MCNC: 97 MG/DL (ref 70–110)
HCT VFR BLD AUTO: 41.6 % (ref 37–47)
HGB BLD-MCNC: 13.7 G/DL (ref 12–16)
IMM GRANULOCYTES # BLD: 0.02 10*3/MM3 (ref 0–0.03)
IMM GRANULOCYTES NFR BLD: 0.2 % (ref 0–0.5)
INR PPP: 0.96 (ref 0.9–1.1)
LYMPHOCYTES # BLD AUTO: 2.65 10*3/MM3 (ref 1–3)
LYMPHOCYTES NFR BLD AUTO: 30.7 % (ref 16–46)
MCH RBC QN AUTO: 28.7 PG (ref 27–33)
MCHC RBC AUTO-ENTMCNC: 32.9 G/DL (ref 33–37)
MCV RBC AUTO: 87.2 FL (ref 80–94)
MONOCYTES # BLD AUTO: 0.72 10*3/MM3 (ref 0.1–0.9)
MONOCYTES NFR BLD AUTO: 8.3 % (ref 0–12)
NEUTROPHILS # BLD AUTO: 5.07 10*3/MM3 (ref 1.4–6.5)
NEUTROPHILS NFR BLD AUTO: 58.9 % (ref 40–75)
OSMOLALITY SERPL CALC.SUM OF ELEC: 276.9 MOSM/KG (ref 273–305)
PLATELET # BLD AUTO: 286 10*3/MM3 (ref 130–400)
PMV BLD AUTO: 11.2 FL (ref 6–10)
POTASSIUM BLD-SCNC: 4.9 MMOL/L (ref 3.5–5.3)
PROT SERPL-MCNC: 7.4 G/DL (ref 6–8)
PROTHROMBIN TIME: 12.9 SECONDS (ref 11–15.4)
RBC # BLD AUTO: 4.77 10*6/MM3 (ref 4.2–5.4)
SODIUM BLD-SCNC: 139 MMOL/L (ref 135–153)
TROPONIN I SERPL-MCNC: <0.006 NG/ML
WBC NRBC COR # BLD: 8.63 10*3/MM3 (ref 4.5–12.5)

## 2017-11-17 PROCEDURE — 85025 COMPLETE CBC W/AUTO DIFF WBC: CPT | Performed by: EMERGENCY MEDICINE

## 2017-11-17 PROCEDURE — 36415 COLL VENOUS BLD VENIPUNCTURE: CPT

## 2017-11-17 PROCEDURE — 85610 PROTHROMBIN TIME: CPT | Performed by: EMERGENCY MEDICINE

## 2017-11-17 PROCEDURE — 93005 ELECTROCARDIOGRAM TRACING: CPT | Performed by: EMERGENCY MEDICINE

## 2017-11-17 PROCEDURE — 71010 XR CHEST 1 VW: CPT | Performed by: RADIOLOGY

## 2017-11-17 PROCEDURE — 71010 HC CHEST PA OR AP: CPT

## 2017-11-17 PROCEDURE — 99285 EMERGENCY DEPT VISIT HI MDM: CPT

## 2017-11-17 PROCEDURE — 84484 ASSAY OF TROPONIN QUANT: CPT | Performed by: EMERGENCY MEDICINE

## 2017-11-17 PROCEDURE — 80053 COMPREHEN METABOLIC PANEL: CPT | Performed by: EMERGENCY MEDICINE

## 2017-11-17 RX ORDER — ASPIRIN 81 MG/1
324 TABLET, CHEWABLE ORAL ONCE
Status: COMPLETED | OUTPATIENT
Start: 2017-11-17 | End: 2017-11-17

## 2017-11-17 RX ORDER — SODIUM CHLORIDE 0.9 % (FLUSH) 0.9 %
10 SYRINGE (ML) INJECTION AS NEEDED
Status: DISCONTINUED | OUTPATIENT
Start: 2017-11-17 | End: 2017-11-18 | Stop reason: HOSPADM

## 2017-11-17 RX ADMIN — ASPIRIN 324 MG: 81 TABLET, CHEWABLE ORAL at 23:17

## 2017-11-17 RX ADMIN — NITROGLYCERIN 1 INCH: 20 OINTMENT TOPICAL at 23:18

## 2017-11-18 VITALS
HEART RATE: 89 BPM | HEIGHT: 60 IN | SYSTOLIC BLOOD PRESSURE: 102 MMHG | TEMPERATURE: 97.9 F | WEIGHT: 160 LBS | OXYGEN SATURATION: 97 % | RESPIRATION RATE: 16 BRPM | BODY MASS INDEX: 31.41 KG/M2 | DIASTOLIC BLOOD PRESSURE: 79 MMHG

## 2017-11-18 LAB
HOLD SPECIMEN: NORMAL
HOLD SPECIMEN: NORMAL
TROPONIN I SERPL-MCNC: <0.006 NG/ML
WHOLE BLOOD HOLD SPECIMEN: NORMAL
WHOLE BLOOD HOLD SPECIMEN: NORMAL

## 2017-11-18 PROCEDURE — 84484 ASSAY OF TROPONIN QUANT: CPT | Performed by: EMERGENCY MEDICINE

## 2017-11-18 RX ORDER — NITROGLYCERIN 0.4 MG/1
0.4 TABLET SUBLINGUAL
Qty: 25 TABLET | Refills: 0 | Status: SHIPPED | OUTPATIENT
Start: 2017-11-18 | End: 2020-12-22 | Stop reason: SDUPTHER

## 2017-11-18 RX ORDER — OXYCODONE HYDROCHLORIDE AND ACETAMINOPHEN 5; 325 MG/1; MG/1
1 TABLET ORAL EVERY 6 HOURS PRN
Qty: 12 TABLET | Refills: 0 | Status: SHIPPED | OUTPATIENT
Start: 2017-11-18 | End: 2017-11-18 | Stop reason: HOSPADM

## 2017-11-18 NOTE — ED NOTES
PT IS AAO X4 PT HAS NO SIGNS OF DISTRESS AT THIS TIME BREATHING IS EQUAL AND UNLABORED SKIN PWD      Shital Sibley, SHELLEY  11/18/17 0156
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
## 2017-11-18 NOTE — DISCHARGE INSTRUCTIONS

## 2017-12-01 ENCOUNTER — LAB (OUTPATIENT)
Dept: LAB | Facility: HOSPITAL | Age: 67
End: 2017-12-01

## 2017-12-01 ENCOUNTER — HOSPITAL ENCOUNTER (OUTPATIENT)
Dept: RESPIRATORY THERAPY | Facility: HOSPITAL | Age: 67
Discharge: HOME | End: 2017-12-01
Attending: INTERNAL MEDICINE

## 2017-12-01 ENCOUNTER — HOSPITAL ENCOUNTER (OUTPATIENT)
Dept: RESPIRATORY THERAPY | Facility: HOSPITAL | Age: 67
Discharge: HOME | End: 2017-12-01
Attending: INTERNAL MEDICINE | Admitting: INTERNAL MEDICINE

## 2017-12-01 DIAGNOSIS — R06.02 SOB (SHORTNESS OF BREATH): ICD-10-CM

## 2017-12-01 DIAGNOSIS — R00.2 PALPITATIONS: ICD-10-CM

## 2017-12-01 DIAGNOSIS — E78.5 DYSLIPIDEMIA: ICD-10-CM

## 2017-12-01 LAB
ALBUMIN SERPL-MCNC: 4.5 G/DL (ref 3.4–4.8)
ALBUMIN/GLOB SERPL: 1.8 G/DL (ref 1.5–2.5)
ALP SERPL-CCNC: 111 U/L (ref 35–104)
ALT SERPL W P-5'-P-CCNC: 21 U/L (ref 10–36)
ANION GAP SERPL CALCULATED.3IONS-SCNC: 3.6 MMOL/L (ref 3.6–11.2)
AST SERPL-CCNC: 17 U/L (ref 10–30)
BILIRUB SERPL-MCNC: 0.5 MG/DL (ref 0.2–1.8)
BUN BLD-MCNC: 20 MG/DL (ref 7–21)
BUN/CREAT SERPL: 35.1 (ref 7–25)
CALCIUM SPEC-SCNC: 9.6 MG/DL (ref 7.7–10)
CHLORIDE SERPL-SCNC: 108 MMOL/L (ref 99–112)
CHOLEST SERPL-MCNC: 168 MG/DL (ref 0–200)
CO2 SERPL-SCNC: 29.4 MMOL/L (ref 24.3–31.9)
CREAT BLD-MCNC: 0.57 MG/DL (ref 0.43–1.29)
GFR SERPL CREATININE-BSD FRML MDRD: 106 ML/MIN/1.73
GLOBULIN UR ELPH-MCNC: 2.5 GM/DL
GLUCOSE BLD-MCNC: 103 MG/DL (ref 70–110)
HDLC SERPL-MCNC: 61 MG/DL (ref 60–100)
LDLC SERPL CALC-MCNC: 92 MG/DL (ref 0–100)
LDLC/HDLC SERPL: 1.5 {RATIO}
OSMOLALITY SERPL CALC.SUM OF ELEC: 284.1 MOSM/KG (ref 273–305)
POTASSIUM BLD-SCNC: 4.2 MMOL/L (ref 3.5–5.3)
PROT SERPL-MCNC: 7 G/DL (ref 6–8)
SODIUM BLD-SCNC: 141 MMOL/L (ref 135–153)
TRIGL SERPL-MCNC: 76 MG/DL (ref 0–150)
VLDLC SERPL-MCNC: 15.2 MG/DL

## 2017-12-01 PROCEDURE — 94729 DIFFUSING CAPACITY: CPT | Performed by: INTERNAL MEDICINE

## 2017-12-01 PROCEDURE — 94729 DIFFUSING CAPACITY: CPT

## 2017-12-01 PROCEDURE — 80053 COMPREHEN METABOLIC PANEL: CPT

## 2017-12-01 PROCEDURE — 80061 LIPID PANEL: CPT

## 2017-12-01 PROCEDURE — 94727 GAS DIL/WSHOT DETER LNG VOL: CPT | Performed by: INTERNAL MEDICINE

## 2017-12-01 PROCEDURE — 94060 EVALUATION OF WHEEZING: CPT

## 2017-12-01 PROCEDURE — 36415 COLL VENOUS BLD VENIPUNCTURE: CPT

## 2017-12-01 PROCEDURE — 94060 EVALUATION OF WHEEZING: CPT | Performed by: INTERNAL MEDICINE

## 2017-12-01 PROCEDURE — 94727 GAS DIL/WSHOT DETER LNG VOL: CPT

## 2017-12-05 ENCOUNTER — TELEPHONE (OUTPATIENT)
Dept: CARDIOLOGY | Facility: CLINIC | Age: 67
End: 2017-12-05

## 2017-12-08 ENCOUNTER — HOSPITAL ENCOUNTER (OUTPATIENT)
Dept: RESPIRATORY THERAPY | Facility: HOSPITAL | Age: 67
Discharge: HOME | End: 2017-12-08
Attending: INTERNAL MEDICINE | Admitting: INTERNAL MEDICINE

## 2017-12-08 ENCOUNTER — TRANSCRIBE ORDERS (OUTPATIENT)
Dept: ADMINISTRATIVE | Facility: HOSPITAL | Age: 67
End: 2017-12-08

## 2017-12-08 DIAGNOSIS — R00.2 PALPITATIONS: Primary | ICD-10-CM

## 2017-12-08 DIAGNOSIS — R00.2 PALPITATIONS: ICD-10-CM

## 2017-12-08 PROCEDURE — 93226 XTRNL ECG REC<48 HR SCAN A/R: CPT

## 2017-12-08 PROCEDURE — 93225 XTRNL ECG REC<48 HRS REC: CPT

## 2017-12-08 PROCEDURE — 93227 XTRNL ECG REC<48 HR R&I: CPT | Performed by: INTERNAL MEDICINE

## 2017-12-13 ENCOUNTER — OFFICE VISIT (OUTPATIENT)
Dept: CARDIOLOGY | Facility: CLINIC | Age: 67
End: 2017-12-13

## 2017-12-13 VITALS
HEART RATE: 85 BPM | SYSTOLIC BLOOD PRESSURE: 136 MMHG | HEIGHT: 60 IN | BODY MASS INDEX: 32.04 KG/M2 | DIASTOLIC BLOOD PRESSURE: 75 MMHG | WEIGHT: 163.2 LBS | RESPIRATION RATE: 16 BRPM

## 2017-12-13 DIAGNOSIS — R06.02 SOB (SHORTNESS OF BREATH): ICD-10-CM

## 2017-12-13 DIAGNOSIS — R07.2 PRECORDIAL PAIN: Primary | ICD-10-CM

## 2017-12-13 DIAGNOSIS — R00.2 PALPITATIONS: ICD-10-CM

## 2017-12-13 DIAGNOSIS — E78.5 DYSLIPIDEMIA: ICD-10-CM

## 2017-12-13 PROCEDURE — 99214 OFFICE O/P EST MOD 30 MIN: CPT | Performed by: INTERNAL MEDICINE

## 2017-12-13 NOTE — PROGRESS NOTES
Samuel Duane Kreis, MD  Julieta Holt  : 1950  DATE:10/25/2017    Patient Active Problem List   Diagnosis   • Palpitations   • Precordial pain   • Dyslipidemia   • SOB (shortness of breath)       Dear Samuel Duane Kreis, MD:    Subjective     Julieta Holt is a 67 y.o. female with the above medical problems who is being seen for follow-up.  According to the patient she has continued to present with him and episodes of chest pain.  She underwent a stress test which showed evidence of a also old infarct but no evidence of ischemia.  She also underwent an echocardiogram that did show a mildly decreased ejection fraction on the mid-40s to 50s.  She has a history of shortness of breath appears to be related to mild restrictive lung disease.  He also continues to present with palpitations.  She underwent a 48-hour Holter that showed evidence of frequent PACs.      Current Outpatient Prescriptions:   •  nitroglycerin (NITROSTAT) 0.4 MG SL tablet, Place 1 tablet under the tongue Every 5 (Five) Minutes As Needed for Chest Pain. Take no more than 3 doses in 15 minutes., Disp: 25 tablet, Rfl: 0  •  metoprolol tartrate (LOPRESSOR) 25 MG tablet, Take 1 tablet by mouth 2 (Two) Times a Day., Disp: 60 tablet, Rfl: 6    The following portions of the patient's history were reviewed and updated as appropriate: allergies, current medications, past family history, past medical history, past social history, past surgical history and problem list.    Review of Systems   Constitution: Negative for chills, diaphoresis and fever.   HENT: Negative for congestion, nosebleeds and sore throat.    Eyes: Negative for blurred vision, pain and redness.   Cardiovascular: Positive for chest pain and palpitations. Negative for leg swelling, orthopnea, paroxysmal nocturnal dyspnea and syncope.   Respiratory: Positive for shortness of breath. Negative for cough, hemoptysis and wheezing.    Endocrine: Negative for cold  "intolerance and heat intolerance.   Hematologic/Lymphatic: Does not bruise/bleed easily.   Skin: Negative for rash.   Musculoskeletal: Positive for arthritis, back pain, joint pain, joint swelling and stiffness. Negative for myalgias.   Gastrointestinal: Negative for abdominal pain, constipation, diarrhea, hematemesis, hematochezia, melena, nausea and vomiting.   Genitourinary: Negative for dysuria and hematuria.   Neurological: Positive for dizziness. Negative for focal weakness, headaches and numbness.   Psychiatric/Behavioral: Positive for depression. The patient is nervous/anxious.        Objective   Blood pressure 136/75, pulse 85, resp. rate 16, height 152.4 cm (60\"), weight 74 kg (163 lb 3.2 oz), not currently breastfeeding.    Physical Exam   Constitutional: She is oriented to person, place, and time. She appears well-developed and well-nourished.   Elderly WF sitting comfortably on chair.   HENT:   Mouth/Throat: Oropharynx is clear and moist.   Eyes: EOM are normal. Pupils are equal, round, and reactive to light.   Neck: Neck supple. No JVD present. No tracheal deviation present. No thyromegaly present.   Cardiovascular: Normal rate, regular rhythm, S1 normal and S2 normal.  Exam reveals no gallop and no friction rub.    No murmur heard.  Pulmonary/Chest: Effort normal and breath sounds normal. No respiratory distress. She has no wheezes. She has no rales.   Abdominal: Soft. Bowel sounds are normal. She exhibits no mass. There is no tenderness.   Musculoskeletal: Normal range of motion. She exhibits no edema.   Lymphadenopathy:     She has no cervical adenopathy.   Neurological: She is alert and oriented to person, place, and time.   Skin: Skin is warm and dry. No rash noted.   Psychiatric: She has a normal mood and affect.       Procedures    Assessment/Plan      1.  Chest pain: Patient with chest pain that appears to be noncardiac in nature.  She underwent a stress test which showed no evidence of " ischemia and echocardiogram that showed no evidence of significant wall motion abnormalities.    2.  ASCVD: Patient with what appears to be ASCVD with an old infarct noted on stress this.  At this time will start her on a beta blocker.  We will add a low-dose aspirin.  We'll need to consider adding a statin and ACE inhibitor to her regimen the future.    3.  Palpitations: A shunt with a history of palpitations.  He underwent a Holter monitor that showed evidence of frequent PACs.  This point will start the patient on a low-dose beta blocker and monitor for improvement.    4.  Shortness of breath: Patient with shortness of breath appears to be related to mild restrictive lung disease.  This is to be evaluated further by pulmonary.    4.  Dyslipidemia: Patient with history of dyslipidemia with no recent lipid panel showing adequate lipid control.  May consider starting a statin in the future and she does have a possible old infarct.       Diagnosis Plan   1. Precordial pain     2. Palpitations     3. SOB (shortness of breath)     4. Dyslipidemia              Return in about 3 months (around 3/13/2018).    I appreciate the opportunity to participate in this patient's cardiovascular care.    Best Regards    Ortega Lopez

## 2018-04-23 ENCOUNTER — APPOINTMENT (OUTPATIENT)
Dept: BONE DENSITY | Facility: HOSPITAL | Age: 68
End: 2018-04-23

## 2018-04-23 ENCOUNTER — APPOINTMENT (OUTPATIENT)
Dept: MAMMOGRAPHY | Facility: HOSPITAL | Age: 68
End: 2018-04-23

## 2018-05-31 ENCOUNTER — HOSPITAL ENCOUNTER (OUTPATIENT)
Dept: MAMMOGRAPHY | Facility: HOSPITAL | Age: 68
Discharge: HOME | End: 2018-05-31
Admitting: NURSE PRACTITIONER

## 2018-05-31 ENCOUNTER — HOSPITAL ENCOUNTER (OUTPATIENT)
Dept: BONE DENSITY | Facility: HOSPITAL | Age: 68
Discharge: HOME | End: 2018-05-31

## 2018-05-31 DIAGNOSIS — Z12.39 BREAST CANCER SCREENING: ICD-10-CM

## 2018-05-31 DIAGNOSIS — M81.0 SENILE OSTEOPOROSIS: ICD-10-CM

## 2018-05-31 PROCEDURE — 77067 SCR MAMMO BI INCL CAD: CPT | Performed by: RADIOLOGY

## 2018-05-31 PROCEDURE — 77063 BREAST TOMOSYNTHESIS BI: CPT

## 2018-05-31 PROCEDURE — 77063 BREAST TOMOSYNTHESIS BI: CPT | Performed by: RADIOLOGY

## 2018-05-31 PROCEDURE — 77067 SCR MAMMO BI INCL CAD: CPT

## 2018-05-31 PROCEDURE — 77080 DXA BONE DENSITY AXIAL: CPT

## 2018-05-31 PROCEDURE — 77080 DXA BONE DENSITY AXIAL: CPT | Performed by: RADIOLOGY

## 2018-08-08 ENCOUNTER — HOSPITAL ENCOUNTER (OUTPATIENT)
Dept: GENERAL RADIOLOGY | Facility: HOSPITAL | Age: 68
Discharge: HOME | End: 2018-08-08
Admitting: NURSE PRACTITIONER

## 2018-08-08 ENCOUNTER — TRANSCRIBE ORDERS (OUTPATIENT)
Dept: ADMINISTRATIVE | Facility: HOSPITAL | Age: 68
End: 2018-08-08

## 2018-08-08 DIAGNOSIS — M54.5 LOW BACK PAIN, UNSPECIFIED BACK PAIN LATERALITY, UNSPECIFIED CHRONICITY, WITH SCIATICA PRESENCE UNSPECIFIED: Primary | ICD-10-CM

## 2018-08-08 DIAGNOSIS — M54.5 LOW BACK PAIN, UNSPECIFIED BACK PAIN LATERALITY, UNSPECIFIED CHRONICITY, WITH SCIATICA PRESENCE UNSPECIFIED: ICD-10-CM

## 2018-08-08 PROCEDURE — 72110 X-RAY EXAM L-2 SPINE 4/>VWS: CPT | Performed by: RADIOLOGY

## 2018-08-08 PROCEDURE — 72110 X-RAY EXAM L-2 SPINE 4/>VWS: CPT

## 2018-09-05 DIAGNOSIS — M25.552 HIP PAIN, LEFT: Primary | ICD-10-CM

## 2018-09-06 ENCOUNTER — OFFICE VISIT (OUTPATIENT)
Dept: ORTHOPEDIC SURGERY | Facility: CLINIC | Age: 68
End: 2018-09-06

## 2018-09-06 ENCOUNTER — HOSPITAL ENCOUNTER (OUTPATIENT)
Dept: GENERAL RADIOLOGY | Facility: HOSPITAL | Age: 68
Discharge: HOME | End: 2018-09-06
Attending: ORTHOPAEDIC SURGERY | Admitting: ORTHOPAEDIC SURGERY

## 2018-09-06 VITALS
HEART RATE: 76 BPM | BODY MASS INDEX: 31.15 KG/M2 | SYSTOLIC BLOOD PRESSURE: 142 MMHG | DIASTOLIC BLOOD PRESSURE: 74 MMHG | HEIGHT: 61 IN | WEIGHT: 165 LBS

## 2018-09-06 DIAGNOSIS — M70.62 TROCHANTERIC BURSITIS OF LEFT HIP: Primary | ICD-10-CM

## 2018-09-06 DIAGNOSIS — M25.552 HIP PAIN, LEFT: ICD-10-CM

## 2018-09-06 PROCEDURE — 99203 OFFICE O/P NEW LOW 30 MIN: CPT | Performed by: ORTHOPAEDIC SURGERY

## 2018-09-06 PROCEDURE — 73502 X-RAY EXAM HIP UNI 2-3 VIEWS: CPT | Performed by: RADIOLOGY

## 2018-09-06 PROCEDURE — 73502 X-RAY EXAM HIP UNI 2-3 VIEWS: CPT

## 2018-09-06 PROCEDURE — 20610 DRAIN/INJ JOINT/BURSA W/O US: CPT | Performed by: ORTHOPAEDIC SURGERY

## 2018-09-06 RX ORDER — BUPIVACAINE HYDROCHLORIDE 5 MG/ML
5 INJECTION, SOLUTION EPIDURAL; INTRACAUDAL
Status: COMPLETED | OUTPATIENT
Start: 2018-09-06 | End: 2018-09-06

## 2018-09-06 RX ORDER — METHYLPREDNISOLONE ACETATE 80 MG/ML
160 INJECTION, SUSPENSION INTRA-ARTICULAR; INTRALESIONAL; INTRAMUSCULAR; SOFT TISSUE
Status: DISCONTINUED | OUTPATIENT
Start: 2018-09-06 | End: 2018-09-17

## 2018-09-06 RX ORDER — DICLOFENAC SODIUM 75 MG/1
75 TABLET, DELAYED RELEASE ORAL 2 TIMES DAILY
Status: ON HOLD | COMMUNITY
Start: 2018-07-17 | End: 2021-09-16

## 2018-09-06 RX ORDER — AMOXICILLIN AND CLAVULANATE POTASSIUM 875; 125 MG/1; MG/1
TABLET, FILM COATED ORAL
COMMUNITY
Start: 2018-08-27 | End: 2020-12-22

## 2018-09-06 RX ORDER — METOPROLOL SUCCINATE 25 MG/1
25 TABLET, EXTENDED RELEASE ORAL 2 TIMES DAILY
COMMUNITY
Start: 2018-08-21 | End: 2020-06-29 | Stop reason: DRUGHIGH

## 2018-09-06 RX ORDER — GABAPENTIN 100 MG/1
100 CAPSULE ORAL DAILY
COMMUNITY
Start: 2018-08-09 | End: 2021-03-02

## 2018-09-06 RX ORDER — FLUTICASONE PROPIONATE 50 MCG
SPRAY, SUSPENSION (ML) NASAL
Status: ON HOLD | COMMUNITY
Start: 2018-08-27 | End: 2021-09-16

## 2018-09-06 RX ORDER — DULOXETIN HYDROCHLORIDE 30 MG/1
30 CAPSULE, DELAYED RELEASE ORAL 2 TIMES DAILY
COMMUNITY
Start: 2018-08-21 | End: 2021-09-17 | Stop reason: HOSPADM

## 2018-09-06 RX ADMIN — BUPIVACAINE HYDROCHLORIDE 5 ML: 5 INJECTION, SOLUTION EPIDURAL; INTRACAUDAL at 09:24

## 2018-09-06 RX ADMIN — METHYLPREDNISOLONE ACETATE 80 MG: 40 INJECTION, SUSPENSION INTRA-ARTICULAR; INTRALESIONAL; INTRAMUSCULAR; SOFT TISSUE at 09:24
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
## 2018-09-17 RX ORDER — METHYLPREDNISOLONE ACETATE 40 MG/ML
80 INJECTION, SUSPENSION INTRA-ARTICULAR; INTRALESIONAL; INTRAMUSCULAR; SOFT TISSUE
Status: COMPLETED | OUTPATIENT
Start: 2018-09-06 | End: 2018-09-06

## 2018-10-30 ENCOUNTER — OFFICE VISIT (OUTPATIENT)
Dept: ORTHOPEDIC SURGERY | Facility: CLINIC | Age: 68
End: 2018-10-30

## 2018-10-30 VITALS — HEIGHT: 61 IN | WEIGHT: 180 LBS | BODY MASS INDEX: 33.99 KG/M2

## 2018-10-30 DIAGNOSIS — M70.62 TROCHANTERIC BURSITIS OF LEFT HIP: Primary | ICD-10-CM

## 2018-10-30 PROCEDURE — 99213 OFFICE O/P EST LOW 20 MIN: CPT | Performed by: ORTHOPAEDIC SURGERY
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
## 2018-11-13 ENCOUNTER — TRANSCRIBE ORDERS (OUTPATIENT)
Dept: ADMINISTRATIVE | Facility: HOSPITAL | Age: 68
End: 2018-11-13

## 2018-11-13 DIAGNOSIS — Z87.891 PERSONAL HISTORY OF TOBACCO USE, PRESENTING HAZARDS TO HEALTH: Primary | ICD-10-CM

## 2018-11-29 ENCOUNTER — HOSPITAL ENCOUNTER (OUTPATIENT)
Dept: CT IMAGING | Facility: HOSPITAL | Age: 68
Discharge: HOME | End: 2018-11-29
Admitting: NURSE PRACTITIONER

## 2018-11-29 DIAGNOSIS — Z87.891 PERSONAL HISTORY OF TOBACCO USE, PRESENTING HAZARDS TO HEALTH: ICD-10-CM

## 2018-11-29 PROCEDURE — G0297 LDCT FOR LUNG CA SCREEN: HCPCS

## 2018-11-29 PROCEDURE — G0297 LDCT FOR LUNG CA SCREEN: HCPCS | Performed by: RADIOLOGY

## 2019-03-20 ENCOUNTER — HOSPITAL ENCOUNTER (OUTPATIENT)
Dept: GENERAL RADIOLOGY | Facility: HOSPITAL | Age: 69
Discharge: HOME | End: 2019-03-20
Admitting: NURSE PRACTITIONER

## 2019-03-20 ENCOUNTER — TRANSCRIBE ORDERS (OUTPATIENT)
Dept: GENERAL RADIOLOGY | Facility: HOSPITAL | Age: 69
End: 2019-03-20

## 2019-03-20 DIAGNOSIS — M79.641 RIGHT HAND PAIN: Primary | ICD-10-CM

## 2019-03-20 DIAGNOSIS — M79.641 RIGHT HAND PAIN: ICD-10-CM

## 2019-03-20 PROCEDURE — 73130 X-RAY EXAM OF HAND: CPT | Performed by: RADIOLOGY

## 2019-03-20 PROCEDURE — 73130 X-RAY EXAM OF HAND: CPT

## 2019-04-25 ENCOUNTER — TRANSCRIBE ORDERS (OUTPATIENT)
Dept: ADMINISTRATIVE | Facility: HOSPITAL | Age: 69
End: 2019-04-25

## 2019-04-25 ENCOUNTER — HOSPITAL ENCOUNTER (OUTPATIENT)
Dept: GENERAL RADIOLOGY | Facility: HOSPITAL | Age: 69
Discharge: HOME | End: 2019-04-25
Admitting: NURSE PRACTITIONER

## 2019-04-25 DIAGNOSIS — R05.9 COUGH: Primary | ICD-10-CM

## 2019-04-25 DIAGNOSIS — R05.9 COUGH: ICD-10-CM

## 2019-04-25 PROCEDURE — 71046 X-RAY EXAM CHEST 2 VIEWS: CPT | Performed by: RADIOLOGY

## 2019-04-25 PROCEDURE — 71046 X-RAY EXAM CHEST 2 VIEWS: CPT

## 2020-04-14 ENCOUNTER — TRANSCRIBE ORDERS (OUTPATIENT)
Dept: ADMINISTRATIVE | Facility: HOSPITAL | Age: 70
End: 2020-04-14

## 2020-04-14 DIAGNOSIS — Z87.891 PERSONAL HISTORY OF NICOTINE DEPENDENCE: Primary | ICD-10-CM

## 2020-04-16 ENCOUNTER — OFFICE VISIT (OUTPATIENT)
Dept: CARDIOLOGY | Facility: CLINIC | Age: 70
End: 2020-04-16

## 2020-04-16 VITALS
DIASTOLIC BLOOD PRESSURE: 77 MMHG | SYSTOLIC BLOOD PRESSURE: 142 MMHG | BODY MASS INDEX: 38.52 KG/M2 | HEIGHT: 60 IN | WEIGHT: 196.2 LBS | HEART RATE: 58 BPM | RESPIRATION RATE: 16 BRPM

## 2020-04-16 DIAGNOSIS — I10 ESSENTIAL HYPERTENSION: ICD-10-CM

## 2020-04-16 DIAGNOSIS — R00.2 PALPITATIONS: ICD-10-CM

## 2020-04-16 DIAGNOSIS — R06.09 DYSPNEA ON EXERTION: ICD-10-CM

## 2020-04-16 DIAGNOSIS — R07.2 PRECORDIAL PAIN: Primary | ICD-10-CM

## 2020-04-16 PROCEDURE — 99214 OFFICE O/P EST MOD 30 MIN: CPT | Performed by: INTERNAL MEDICINE

## 2020-04-16 PROCEDURE — 93000 ELECTROCARDIOGRAM COMPLETE: CPT | Performed by: INTERNAL MEDICINE

## 2020-04-16 RX ORDER — METOPROLOL TARTRATE 100 MG/1
100 TABLET ORAL 2 TIMES DAILY
COMMUNITY
End: 2020-06-29 | Stop reason: SDUPTHER

## 2020-04-16 RX ORDER — BUSPIRONE HYDROCHLORIDE 10 MG/1
10 TABLET ORAL 2 TIMES DAILY
Status: ON HOLD | COMMUNITY
End: 2021-09-16

## 2020-04-16 RX ORDER — ALENDRONATE SODIUM 70 MG/1
70 TABLET ORAL
Status: ON HOLD | COMMUNITY
End: 2021-09-16

## 2020-04-16 RX ORDER — ISOSORBIDE MONONITRATE 60 MG/1
60 TABLET, EXTENDED RELEASE ORAL EVERY MORNING
Qty: 30 TABLET | Refills: 3 | Status: SHIPPED | OUTPATIENT
Start: 2020-04-16 | End: 2020-09-29 | Stop reason: SDUPTHER

## 2020-04-16 RX ORDER — ATORVASTATIN CALCIUM 10 MG/1
10 TABLET, FILM COATED ORAL DAILY
COMMUNITY
End: 2020-12-22 | Stop reason: SDUPTHER

## 2020-04-16 RX ORDER — FAMOTIDINE 40 MG/1
40 TABLET, FILM COATED ORAL DAILY
Status: ON HOLD | COMMUNITY
End: 2021-09-16
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
## 2020-04-21 ENCOUNTER — HOSPITAL ENCOUNTER (OUTPATIENT)
Dept: CT IMAGING | Facility: HOSPITAL | Age: 70
Discharge: HOME | End: 2020-04-21
Admitting: NURSE PRACTITIONER

## 2020-04-21 DIAGNOSIS — Z87.891 PERSONAL HISTORY OF NICOTINE DEPENDENCE: ICD-10-CM

## 2020-04-21 PROCEDURE — G0297 LDCT FOR LUNG CA SCREEN: HCPCS

## 2020-04-21 PROCEDURE — G0297 LDCT FOR LUNG CA SCREEN: HCPCS | Performed by: RADIOLOGY

## 2020-05-18 ENCOUNTER — HOSPITAL ENCOUNTER (OUTPATIENT)
Dept: NUCLEAR MEDICINE | Facility: HOSPITAL | Age: 70
Discharge: HOME | End: 2020-05-18

## 2020-05-18 ENCOUNTER — HOSPITAL ENCOUNTER (OUTPATIENT)
Dept: CARDIOLOGY | Facility: HOSPITAL | Age: 70
Discharge: HOME | End: 2020-05-18

## 2020-05-18 DIAGNOSIS — R07.2 PRECORDIAL PAIN: ICD-10-CM

## 2020-05-18 DIAGNOSIS — R06.09 DYSPNEA ON EXERTION: ICD-10-CM

## 2020-05-18 LAB
BH CV ECHO MEAS - % IVS THICK: -0.89 %
BH CV ECHO MEAS - % LVPW THICK: 5.5 %
BH CV ECHO MEAS - ACS: 2.1 CM
BH CV ECHO MEAS - AO MAX PG: 15.2 MMHG
BH CV ECHO MEAS - AO MEAN PG: 6 MMHG
BH CV ECHO MEAS - AO ROOT AREA (BSA CORRECTED): 1.6
BH CV ECHO MEAS - AO ROOT AREA: 6.8 CM^2
BH CV ECHO MEAS - AO ROOT DIAM: 3 CM
BH CV ECHO MEAS - AO V2 MAX: 195 CM/SEC
BH CV ECHO MEAS - AO V2 MEAN: 111 CM/SEC
BH CV ECHO MEAS - AO V2 VTI: 43.5 CM
BH CV ECHO MEAS - BSA(HAYCOCK): 2 M^2
BH CV ECHO MEAS - BSA: 1.9 M^2
BH CV ECHO MEAS - BZI_BMI: 38.3 KILOGRAMS/M^2
BH CV ECHO MEAS - BZI_METRIC_HEIGHT: 152.4 CM
BH CV ECHO MEAS - BZI_METRIC_WEIGHT: 88.9 KG
BH CV ECHO MEAS - EDV(CUBED): 134.6 ML
BH CV ECHO MEAS - EDV(MOD-SP4): 55.2 ML
BH CV ECHO MEAS - EDV(TEICH): 125.2 ML
BH CV ECHO MEAS - EF(CUBED): 67 %
BH CV ECHO MEAS - EF(MOD-SP4): 54.7 %
BH CV ECHO MEAS - EF(TEICH): 58.3 %
BH CV ECHO MEAS - ESV(CUBED): 44.4 ML
BH CV ECHO MEAS - ESV(MOD-SP4): 25 ML
BH CV ECHO MEAS - ESV(TEICH): 52.3 ML
BH CV ECHO MEAS - FS: 30.9 %
BH CV ECHO MEAS - IVS/LVPW: 1
BH CV ECHO MEAS - IVSD: 1.1 CM
BH CV ECHO MEAS - IVSS: 1.1 CM
BH CV ECHO MEAS - LA DIMENSION: 3.2 CM
BH CV ECHO MEAS - LA/AO: 1.1
BH CV ECHO MEAS - LV DIASTOLIC VOL/BSA (35-75): 29.8 ML/M^2
BH CV ECHO MEAS - LV MASS(C)D: 216.9 GRAMS
BH CV ECHO MEAS - LV MASS(C)DI: 117.2 GRAMS/M^2
BH CV ECHO MEAS - LV MASS(C)S: 126 GRAMS
BH CV ECHO MEAS - LV MASS(C)SI: 68.1 GRAMS/M^2
BH CV ECHO MEAS - LV SYSTOLIC VOL/BSA (12-30): 13.5 ML/M^2
BH CV ECHO MEAS - LVIDD: 5.1 CM
BH CV ECHO MEAS - LVIDS: 3.5 CM
BH CV ECHO MEAS - LVLD AP4: 7 CM
BH CV ECHO MEAS - LVLS AP4: 5.3 CM
BH CV ECHO MEAS - LVOT AREA (M): 2.8 CM^2
BH CV ECHO MEAS - LVOT AREA: 2.8 CM^2
BH CV ECHO MEAS - LVOT DIAM: 1.9 CM
BH CV ECHO MEAS - LVPWD: 1.1 CM
BH CV ECHO MEAS - LVPWS: 1.2 CM
BH CV ECHO MEAS - MV A MAX VEL: 128 CM/SEC
BH CV ECHO MEAS - MV E MAX VEL: 75.4 CM/SEC
BH CV ECHO MEAS - MV E/A: 0.59
BH CV ECHO MEAS - PA ACC TIME: 0.09 SEC
BH CV ECHO MEAS - PA PR(ACCEL): 40.8 MMHG
BH CV ECHO MEAS - RAP SYSTOLE: 10 MMHG
BH CV ECHO MEAS - RVSP: 37.5 MMHG
BH CV ECHO MEAS - SI(AO): 160.7 ML/M^2
BH CV ECHO MEAS - SI(CUBED): 48.8 ML/M^2
BH CV ECHO MEAS - SI(MOD-SP4): 16.3 ML/M^2
BH CV ECHO MEAS - SI(TEICH): 39.4 ML/M^2
BH CV ECHO MEAS - SV(AO): 297.3 ML
BH CV ECHO MEAS - SV(CUBED): 90.2 ML
BH CV ECHO MEAS - SV(MOD-SP4): 30.2 ML
BH CV ECHO MEAS - SV(TEICH): 72.9 ML
BH CV ECHO MEAS - TR MAX VEL: 260.5 CM/SEC
BH CV NUCLEAR PRIOR STUDY: 3
BH CV STRESS RECOVERY BP: NORMAL MMHG
BH CV STRESS RECOVERY HR: 85 BPM
LV EF NUC BP: 64 %
MAXIMAL PREDICTED HEART RATE: 150 BPM
PERCENT MAX PREDICTED HR: 65.33 %
STRESS BASELINE BP: NORMAL MMHG
STRESS BASELINE HR: 95 BPM
STRESS PERCENT HR: 77 %
STRESS POST PEAK BP: NORMAL MMHG
STRESS POST PEAK HR: 98 BPM
STRESS TARGET HR: 128 BPM

## 2020-05-18 PROCEDURE — 93018 CV STRESS TEST I&R ONLY: CPT | Performed by: INTERNAL MEDICINE

## 2020-05-18 PROCEDURE — 93306 TTE W/DOPPLER COMPLETE: CPT

## 2020-05-18 PROCEDURE — 78452 HT MUSCLE IMAGE SPECT MULT: CPT | Performed by: INTERNAL MEDICINE

## 2020-05-18 PROCEDURE — 93306 TTE W/DOPPLER COMPLETE: CPT | Performed by: INTERNAL MEDICINE

## 2020-05-18 PROCEDURE — 78452 HT MUSCLE IMAGE SPECT MULT: CPT

## 2020-05-18 PROCEDURE — 0 TECHNETIUM SESTAMIBI: Performed by: INTERNAL MEDICINE

## 2020-05-18 PROCEDURE — 25010000002 REGADENOSON 0.4 MG/5ML SOLUTION: Performed by: INTERNAL MEDICINE

## 2020-05-18 PROCEDURE — 93017 CV STRESS TEST TRACING ONLY: CPT

## 2020-05-18 PROCEDURE — A9500 TC99M SESTAMIBI: HCPCS | Performed by: INTERNAL MEDICINE

## 2020-05-18 RX ADMIN — REGADENOSON 0.4 MG: 0.08 INJECTION, SOLUTION INTRAVENOUS at 10:42

## 2020-05-18 RX ADMIN — TECHNETIUM TC 99M SESTAMIBI 1 DOSE: 1 INJECTION INTRAVENOUS at 09:00

## 2020-05-18 RX ADMIN — TECHNETIUM TC 99M SESTAMIBI 1 DOSE: 1 INJECTION INTRAVENOUS at 10:42

## 2020-06-29 ENCOUNTER — OFFICE VISIT (OUTPATIENT)
Dept: CARDIOLOGY | Facility: CLINIC | Age: 70
End: 2020-06-29

## 2020-06-29 VITALS
HEIGHT: 60 IN | BODY MASS INDEX: 38.95 KG/M2 | DIASTOLIC BLOOD PRESSURE: 79 MMHG | TEMPERATURE: 97.9 F | RESPIRATION RATE: 16 BRPM | WEIGHT: 198.4 LBS | SYSTOLIC BLOOD PRESSURE: 146 MMHG | HEART RATE: 109 BPM

## 2020-06-29 DIAGNOSIS — R00.2 PALPITATIONS: ICD-10-CM

## 2020-06-29 DIAGNOSIS — R07.2 PRECORDIAL PAIN: Primary | ICD-10-CM

## 2020-06-29 DIAGNOSIS — E78.5 DYSLIPIDEMIA: ICD-10-CM

## 2020-06-29 PROCEDURE — 99214 OFFICE O/P EST MOD 30 MIN: CPT | Performed by: PHYSICIAN ASSISTANT

## 2020-06-29 RX ORDER — LISINOPRIL 5 MG/1
5 TABLET ORAL DAILY
Qty: 90 TABLET | Refills: 3 | Status: SHIPPED | OUTPATIENT
Start: 2020-06-29 | End: 2020-12-22 | Stop reason: SDUPTHER

## 2020-06-29 RX ORDER — FUROSEMIDE 20 MG/1
20 TABLET ORAL DAILY PRN
Qty: 30 TABLET | Refills: 1 | Status: SHIPPED | OUTPATIENT
Start: 2020-06-29 | End: 2020-12-22 | Stop reason: SDUPTHER

## 2020-06-29 RX ORDER — METOPROLOL TARTRATE 100 MG/1
100 TABLET ORAL 2 TIMES DAILY
Qty: 180 TABLET | Refills: 2 | Status: SHIPPED | OUTPATIENT
Start: 2020-06-29 | End: 2020-09-29

## 2020-06-29 NOTE — PROGRESS NOTES
"Joan Vasquez, APRN  Julieta Holt  1950  06/29/2020    Patient Active Problem List   Diagnosis   • Palpitations   • Precordial pain   • Dyslipidemia   • SOB (shortness of breath)   • Trochanteric bursitis of left hip       Dear Joan Vasquez, APRN:    Subjective     History of Present Illness:    Chief Complaint   Patient presents with   • Results     stress and echo findings   • Palpitations     races   • Shortness of Breath     \"little bit\"   • Edema     LE   • Med Management     list provided       Julieta Holt is a pleasant 69 y.o. female with a past medical history significant for no known history of coronary artery disease but does have history of prediabetes and history of tobacco abuse but quit in 2004 with 30-pack-year history.  She also has history of essential hypertension, dyslipidemia, and family history of coronary artery disease.  She is here today for results of stress test and echocardiogram.    Patient stress test did not reveal any signs of ischemia however echocardiogram did reveal left ventricular ejection fraction of 45 to 50% with mild mitral valve regurgitation.    Patient reports she does still have some chest pains occasionally.  She reports that these pains occur roughly once weekly.  She does report that these pains do feel like a sharp sensation just over her xiphoid process.  She does report that these pains typically do come on at random but also when she is under increased stress from work.  She reports that these only last 1 to 2 minutes in duration typically.  She does also report having palpitations/fluttering of her heart that occur typically once a month on average and reports that the fluttering will last 3 to 4 minutes in duration.  She does report she had left heart catheterization roughly 2 years ago at [facility] [place] and reports that results were unremarkable.  Otherwise she does deny any other history of CAD with prior " history of stents, CVA, reports that she is prediabetic, history of tobacco abuse but quit in 2004.    Allergies   Allergen Reactions   • Erythromycin        bruise   :      Current Outpatient Medications:   •  alendronate (FOSAMAX) 70 MG tablet, Take 70 mg by mouth Every 7 (Seven) Days., Disp: , Rfl:   •  aspirin 81 MG tablet, Take 1 tablet by mouth Daily., Disp: 30 tablet, Rfl: 11  •  atorvastatin (LIPITOR) 10 MG tablet, Take 10 mg by mouth Daily., Disp: , Rfl:   •  busPIRone (BUSPAR) 10 MG tablet, Take 10 mg by mouth 2 (Two) Times a Day., Disp: , Rfl:   •  cholecalciferol (VITAMIN D3) 1.25 MG (94490 UT) capsule, Take 50,000 Units by mouth 1 (One) Time Per Week., Disp: , Rfl:   •  diclofenac (VOLTAREN) 75 MG EC tablet, 75 mg 2 (Two) Times a Day., Disp: , Rfl:   •  DULoxetine (CYMBALTA) 30 MG capsule, 30 mg 2 (Two) Times a Day., Disp: , Rfl:   •  famotidine (PEPCID) 40 MG tablet, Take 40 mg by mouth Daily., Disp: , Rfl:   •  fluticasone (FLONASE) 50 MCG/ACT nasal spray, , Disp: , Rfl:   •  isosorbide mononitrate (IMDUR) 60 MG 24 hr tablet, Take 1 tablet by mouth Every Morning., Disp: 30 tablet, Rfl: 3  •  metoprolol tartrate (LOPRESSOR) 100 MG tablet, Take 1 tablet by mouth 2 (Two) Times a Day., Disp: 180 tablet, Rfl: 2  •  nitroglycerin (NITROSTAT) 0.4 MG SL tablet, Place 1 tablet under the tongue Every 5 (Five) Minutes As Needed for Chest Pain. Take no more than 3 doses in 15 minutes., Disp: 25 tablet, Rfl: 0  •  amoxicillin-clavulanate (AUGMENTIN) 875-125 MG per tablet, , Disp: , Rfl:   •  furosemide (LASIX) 20 MG tablet, Take 1 tablet by mouth Daily As Needed (increased shortness of breath, pedal edema, or weight gain >4 lbs in a day)., Disp: 30 tablet, Rfl: 1  •  gabapentin (NEURONTIN) 100 MG capsule, 100 mg Daily., Disp: , Rfl:   •  lisinopril (PRINIVIL,ZESTRIL) 5 MG tablet, Take 1 tablet by mouth Daily., Disp: 90 tablet, Rfl: 3    The following portions of the patient's history were reviewed and updated as  "appropriate: allergies, current medications, past family history, past medical history, past social history, past surgical history and problem list.    Social History     Tobacco Use   • Smoking status: Former Smoker     Packs/day: 1.50     Types: Cigarettes     Last attempt to quit: 2004     Years since quittin.5   • Smokeless tobacco: Never Used   • Tobacco comment: quit    Substance Use Topics   • Alcohol use: No   • Drug use: No       Review of Systems   Constitution: Negative for malaise/fatigue.   Cardiovascular: Positive for leg swelling and palpitations. Negative for chest pain, dyspnea on exertion and irregular heartbeat.   Respiratory: Positive for shortness of breath. Negative for cough.    Hematologic/Lymphatic: Negative for bleeding problem. Does not bruise/bleed easily.   Gastrointestinal: Negative for nausea and vomiting.   Neurological: Negative for weakness.       Objective   Vitals:    20 0915   BP: 146/79   Pulse: 109   Resp: 16   Temp: 97.9 °F (36.6 °C)   Weight: 90 kg (198 lb 6.4 oz)   Height: 152.4 cm (60\")     Body mass index is 38.75 kg/m².    Physical Exam   Constitutional: She is oriented to person, place, and time. She appears well-developed and well-nourished. No distress.   HENT:   Head: Normocephalic and atraumatic.   Cardiovascular: Normal rate, regular rhythm and normal heart sounds.   Pulmonary/Chest: Effort normal and breath sounds normal. No respiratory distress.   Musculoskeletal: She exhibits edema ( 1+ pedal edema bilaterally).   Neurological: She is alert and oriented to person, place, and time.   Skin: She is not diaphoretic.       Lab Results   Component Value Date     2017    K 4.2 2017     2017    CO2 29.4 2017    BUN 20 2017    CREATININE 0.57 2017    GLUCOSE 103 2017    CALCIUM 9.6 2017    AST 17 2017    ALT 21 2017    ALKPHOS 111 (H) 2017     No results found for: CKTOTAL  Lab " Results   Component Value Date    WBC 8.63 11/17/2017    HGB 13.7 11/17/2017    HCT 41.6 11/17/2017     11/17/2017     Lab Results   Component Value Date    INR 0.96 11/17/2017     No results found for: MG  Lab Results   Component Value Date    TRIG 76 12/01/2017    HDL 61 12/01/2017    LDL 92 12/01/2017      No results found for: BNP    During this visit the following were done:  Labs Reviewed [x]    Labs Ordered []    Radiology Reports Reviewed [x]    Radiology Ordered []    PCP Records Reviewed []    Referring Provider Records Reviewed []    ER Records Reviewed []    Hospital Records Reviewed []    History Obtained From Family []    Radiology Images Reviewed []    Other Reviewed []    Records Requested []       Procedures    Assessment/Plan    Diagnosis Plan   1. Precordial pain  Basic Metabolic Panel   2. Palpitations     3. Dyslipidemia            Recommendations:  1. I discussed results of stress test and echocardiogram with the patient.  2. I will continue optimizing medical therapy I will start her on lisinopril 5 mg and will check a BMP in 1 week.  3. Continue aspirin, Lipitor, and metoprolol.  4. I will request left heart catheterization that was done 2018 from [facility].    Return in about 3 months (around 9/29/2020).    As always, I appreciate very much the opportunity to participate in the cardiovascular care of your patients.      With Best Regards,    Maxime Scott PA-C

## 2020-08-21 ENCOUNTER — TRANSCRIBE ORDERS (OUTPATIENT)
Dept: ADMINISTRATIVE | Facility: HOSPITAL | Age: 70
End: 2020-08-21

## 2020-08-21 ENCOUNTER — HOSPITAL ENCOUNTER (OUTPATIENT)
Dept: GENERAL RADIOLOGY | Facility: HOSPITAL | Age: 70
Discharge: HOME | End: 2020-08-21
Admitting: NURSE PRACTITIONER

## 2020-08-21 DIAGNOSIS — M25.562 LEFT KNEE PAIN, UNSPECIFIED CHRONICITY: ICD-10-CM

## 2020-08-21 DIAGNOSIS — M25.562 LEFT KNEE PAIN, UNSPECIFIED CHRONICITY: Primary | ICD-10-CM

## 2020-08-21 PROCEDURE — 73564 X-RAY EXAM KNEE 4 OR MORE: CPT

## 2020-08-21 PROCEDURE — 73564 X-RAY EXAM KNEE 4 OR MORE: CPT | Performed by: RADIOLOGY

## 2020-09-29 ENCOUNTER — OFFICE VISIT (OUTPATIENT)
Dept: CARDIOLOGY | Facility: CLINIC | Age: 70
End: 2020-09-29

## 2020-09-29 VITALS
TEMPERATURE: 96.3 F | RESPIRATION RATE: 16 BRPM | HEIGHT: 60 IN | BODY MASS INDEX: 38.6 KG/M2 | SYSTOLIC BLOOD PRESSURE: 109 MMHG | WEIGHT: 196.6 LBS | DIASTOLIC BLOOD PRESSURE: 69 MMHG | HEART RATE: 58 BPM

## 2020-09-29 DIAGNOSIS — E78.5 DYSLIPIDEMIA: ICD-10-CM

## 2020-09-29 DIAGNOSIS — R07.2 PRECORDIAL PAIN: Primary | ICD-10-CM

## 2020-09-29 PROCEDURE — 99214 OFFICE O/P EST MOD 30 MIN: CPT | Performed by: PHYSICIAN ASSISTANT

## 2020-09-29 RX ORDER — METOPROLOL SUCCINATE 100 MG/1
100 TABLET, EXTENDED RELEASE ORAL DAILY
Qty: 90 TABLET | Refills: 2 | Status: SHIPPED | OUTPATIENT
Start: 2020-09-29 | End: 2020-12-22 | Stop reason: SDUPTHER

## 2020-09-29 RX ORDER — ISOSORBIDE MONONITRATE 120 MG/1
120 TABLET, EXTENDED RELEASE ORAL EVERY MORNING
Qty: 90 TABLET | Refills: 2 | Status: SHIPPED | OUTPATIENT
Start: 2020-09-29 | End: 2020-12-22 | Stop reason: SDUPTHER

## 2020-09-29 NOTE — PROGRESS NOTES
Joan Vasquez, APRN  Julieta Holt  1950  09/29/2020    Patient Active Problem List   Diagnosis   • Palpitations   • Precordial pain   • Dyslipidemia   • SOB (shortness of breath)   • Trochanteric bursitis of left hip       Dear Joan Vasquez, APRN:    Subjective     History of Present Illness:    Chief Complaint   Patient presents with   • Chest Pain     3 mos follow, sx's unchanged   • Palpitations     flutters   • Shortness of Breath     routine activity   • Edema     LE   • Med Management     list provided       Julieta Holt is a pleasant 69 y.o. female with a past medical history significant for no known history of coronary artery disease but does have history of prediabetes and history of tobacco abuse but quit in 2004 with 30-pack-year history.  She also has history of essential hypertension, dyslipidemia, and family history of coronary artery disease.    Patient reports she has been having some chest pains roughly once weekly.  She describes these pains as a tightness in the center of her chest that will last for 1 to 2 minutes in duration.  He denies being on to reproduce this pain on exertion.  She reports that does come on when she gets stressed or frustrated otherwise these pains come on at random times.  She did have stress test on 5/18/2020 which was unremarkable. She also had a left heart cath in 2018 that she reports was normal, however, I do not have records with me.     Allergies   Allergen Reactions   • Erythromycin        bruise   :      Current Outpatient Medications:   •  alendronate (FOSAMAX) 70 MG tablet, Take 70 mg by mouth Every 7 (Seven) Days., Disp: , Rfl:   •  aspirin 81 MG tablet, Take 1 tablet by mouth Daily., Disp: 30 tablet, Rfl: 11  •  atorvastatin (LIPITOR) 10 MG tablet, Take 10 mg by mouth Daily., Disp: , Rfl:   •  busPIRone (BUSPAR) 10 MG tablet, Take 10 mg by mouth 2 (Two) Times a Day., Disp: , Rfl:   •  Calcium Carbonate-Vit D-Min (Calcium  600+D3 Plus Minerals) 600-800 MG-UNIT tablet, Take  by mouth Daily., Disp: , Rfl:   •  cholecalciferol (VITAMIN D3) 1.25 MG (05194 UT) capsule, Take 50,000 Units by mouth 1 (One) Time Per Week., Disp: , Rfl:   •  diclofenac (VOLTAREN) 75 MG EC tablet, 75 mg 2 (Two) Times a Day., Disp: , Rfl:   •  DULoxetine (CYMBALTA) 30 MG capsule, 30 mg 2 (Two) Times a Day., Disp: , Rfl:   •  famotidine (PEPCID) 40 MG tablet, Take 40 mg by mouth Daily., Disp: , Rfl:   •  fluticasone (FLONASE) 50 MCG/ACT nasal spray, , Disp: , Rfl:   •  furosemide (LASIX) 20 MG tablet, Take 1 tablet by mouth Daily As Needed (increased shortness of breath, pedal edema, or weight gain >4 lbs in a day)., Disp: 30 tablet, Rfl: 1  •  isosorbide mononitrate (IMDUR) 120 MG 24 hr tablet, Take 1 tablet by mouth Every Morning., Disp: 90 tablet, Rfl: 2  •  lisinopril (PRINIVIL,ZESTRIL) 5 MG tablet, Take 1 tablet by mouth Daily., Disp: 90 tablet, Rfl: 3  •  nitroglycerin (NITROSTAT) 0.4 MG SL tablet, Place 1 tablet under the tongue Every 5 (Five) Minutes As Needed for Chest Pain. Take no more than 3 doses in 15 minutes., Disp: 25 tablet, Rfl: 0  •  amoxicillin-clavulanate (AUGMENTIN) 875-125 MG per tablet, , Disp: , Rfl:   •  gabapentin (NEURONTIN) 100 MG capsule, 100 mg Daily., Disp: , Rfl:   •  metoprolol succinate XL (TOPROL-XL) 100 MG 24 hr tablet, Take 1 tablet by mouth Daily., Disp: 90 tablet, Rfl: 2    The following portions of the patient's history were reviewed and updated as appropriate: allergies, current medications, past family history, past medical history, past social history, past surgical history and problem list.    Social History     Tobacco Use   • Smoking status: Former Smoker     Packs/day: 1.50     Types: Cigarettes     Quit date:      Years since quittin.7   • Smokeless tobacco: Never Used   • Tobacco comment: quit 2004   Substance Use Topics   • Alcohol use: No   • Drug use: No       Review of Systems   Constitution: Negative  "for malaise/fatigue.   Cardiovascular: Positive for chest pain, leg swelling and palpitations. Negative for dyspnea on exertion and irregular heartbeat.   Respiratory: Positive for shortness of breath. Negative for cough.    Hematologic/Lymphatic: Negative for bleeding problem. Does not bruise/bleed easily.   Gastrointestinal: Negative for nausea and vomiting.   Neurological: Negative for weakness.       Objective   Vitals:    09/29/20 0922   BP: 109/69   Pulse: 58   Resp: 16   Temp: 96.3 °F (35.7 °C)   Weight: 89.2 kg (196 lb 9.6 oz)   Height: 152.4 cm (60\")     Body mass index is 38.4 kg/m².    Constitutional:       General: Not in acute distress.     Appearance: Healthy appearance. Well-developed and not in distress. Not diaphoretic.   Eyes:      Conjunctiva/sclera: Conjunctivae normal.      Pupils: Pupils are equal, round, and reactive to light.   HENT:      Head: Normocephalic and atraumatic.   Neck:      Vascular: No carotid bruit or JVD.   Pulmonary:      Effort: Pulmonary effort is normal. No respiratory distress.      Breath sounds: Normal breath sounds.   Cardiovascular:      Normal rate. Regular rhythm.   Skin:     General: Skin is cool.   Neurological:      Mental Status: Alert, oriented to person, place, and time and oriented to person, place and time.         Lab Results   Component Value Date     12/01/2017    K 4.2 12/01/2017     12/01/2017    CO2 29.4 12/01/2017    BUN 20 12/01/2017    CREATININE 0.57 12/01/2017    GLUCOSE 103 12/01/2017    CALCIUM 9.6 12/01/2017    AST 17 12/01/2017    ALT 21 12/01/2017    ALKPHOS 111 (H) 12/01/2017     No results found for: CKTOTAL  Lab Results   Component Value Date    WBC 8.63 11/17/2017    HGB 13.7 11/17/2017    HCT 41.6 11/17/2017     11/17/2017     Lab Results   Component Value Date    INR 0.96 11/17/2017     No results found for: MG  Lab Results   Component Value Date    TRIG 76 12/01/2017    HDL 61 12/01/2017    LDL 92 12/01/2017      No " results found for: BNP    During this visit the following were done:  Labs Reviewed [x]    Labs Ordered []    Radiology Reports Reviewed [x]    Radiology Ordered []    PCP Records Reviewed []    Referring Provider Records Reviewed []    ER Records Reviewed []    Hospital Records Reviewed []    History Obtained From Family []    Radiology Images Reviewed []    Other Reviewed []    Records Requested []       Procedures     Assessment/Plan    Diagnosis Plan   1. Precordial pain     2. Dyslipidemia              Recommendations:  1. Precordial pain  1. I will increase patient's Imdur to 120 mg daily  2. We will also switch metoprolol tartrate to metoprolol succinate 100 mg daily  3. We will try to obtain left heart catheterization from [facility] in 2018  4. Continue lisinopril, aspirin, and Lipitor.      Return in about 3 months (around 12/29/2020).    As always, I appreciate very much the opportunity to participate in the cardiovascular care of your patients.      With Best Regards,    Maxime Scott PA-C

## 2020-09-30 ENCOUNTER — TELEPHONE (OUTPATIENT)
Dept: CARDIOLOGY | Facility: CLINIC | Age: 70
End: 2020-09-30

## 2020-09-30 NOTE — TELEPHONE ENCOUNTER
Called and requested her LHC. Spoke with Alma.     ----- Message from Maxime Scott PA-C sent at 9/29/2020 10:09 AM EDT -----  Can we ask for LHC from [facility]?

## 2020-10-16 ENCOUNTER — HOSPITAL ENCOUNTER (OUTPATIENT)
Dept: MAMMOGRAPHY | Facility: HOSPITAL | Age: 70
Discharge: HOME | End: 2020-10-16

## 2020-10-16 ENCOUNTER — HOSPITAL ENCOUNTER (OUTPATIENT)
Dept: BONE DENSITY | Facility: HOSPITAL | Age: 70
Discharge: HOME | End: 2020-10-16

## 2020-10-16 DIAGNOSIS — Z78.0 POSTMENOPAUSAL STATUS: ICD-10-CM

## 2020-10-16 DIAGNOSIS — Z12.31 VISIT FOR SCREENING MAMMOGRAM: ICD-10-CM

## 2020-10-16 PROCEDURE — 77067 SCR MAMMO BI INCL CAD: CPT

## 2020-10-16 PROCEDURE — 77080 DXA BONE DENSITY AXIAL: CPT

## 2020-10-16 PROCEDURE — 77080 DXA BONE DENSITY AXIAL: CPT | Performed by: RADIOLOGY

## 2020-10-16 PROCEDURE — 77067 SCR MAMMO BI INCL CAD: CPT | Performed by: RADIOLOGY

## 2020-10-16 PROCEDURE — 77063 BREAST TOMOSYNTHESIS BI: CPT

## 2020-10-16 PROCEDURE — 77063 BREAST TOMOSYNTHESIS BI: CPT | Performed by: RADIOLOGY

## 2020-11-13 ENCOUNTER — APPOINTMENT (OUTPATIENT)
Dept: GENERAL RADIOLOGY | Facility: HOSPITAL | Age: 70
End: 2020-11-13

## 2020-11-13 DIAGNOSIS — M25.562 LEFT KNEE PAIN, UNSPECIFIED CHRONICITY: Primary | ICD-10-CM

## 2020-11-16 ENCOUNTER — HOSPITAL ENCOUNTER (OUTPATIENT)
Dept: GENERAL RADIOLOGY | Facility: HOSPITAL | Age: 70
Discharge: HOME | End: 2020-11-16
Admitting: ORTHOPAEDIC SURGERY

## 2020-11-16 ENCOUNTER — OFFICE VISIT (OUTPATIENT)
Dept: ORTHOPEDIC SURGERY | Facility: CLINIC | Age: 70
End: 2020-11-16

## 2020-11-16 VITALS
HEART RATE: 67 BPM | SYSTOLIC BLOOD PRESSURE: 150 MMHG | DIASTOLIC BLOOD PRESSURE: 78 MMHG | BODY MASS INDEX: 37.69 KG/M2 | WEIGHT: 192 LBS | HEIGHT: 60 IN | TEMPERATURE: 96.9 F

## 2020-11-16 DIAGNOSIS — S83.242A ACUTE MEDIAL MENISCUS TEAR OF LEFT KNEE, INITIAL ENCOUNTER: ICD-10-CM

## 2020-11-16 DIAGNOSIS — M25.562 LEFT KNEE PAIN, UNSPECIFIED CHRONICITY: ICD-10-CM

## 2020-11-16 DIAGNOSIS — M25.562 LEFT KNEE PAIN, UNSPECIFIED CHRONICITY: Primary | ICD-10-CM

## 2020-11-16 DIAGNOSIS — M17.12 PRIMARY OSTEOARTHRITIS OF LEFT KNEE: ICD-10-CM

## 2020-11-16 PROCEDURE — 20610 DRAIN/INJ JOINT/BURSA W/O US: CPT | Performed by: ORTHOPAEDIC SURGERY

## 2020-11-16 PROCEDURE — 99213 OFFICE O/P EST LOW 20 MIN: CPT | Performed by: ORTHOPAEDIC SURGERY

## 2020-11-16 PROCEDURE — 73562 X-RAY EXAM OF KNEE 3: CPT | Performed by: RADIOLOGY

## 2020-11-16 PROCEDURE — 73562 X-RAY EXAM OF KNEE 3: CPT

## 2020-11-16 RX ADMIN — LIDOCAINE HYDROCHLORIDE 5 ML: 20 INJECTION, SOLUTION INFILTRATION; PERINEURAL at 13:30

## 2020-11-16 RX ADMIN — METHYLPREDNISOLONE ACETATE 80 MG: 80 INJECTION, SUSPENSION INTRA-ARTICULAR; INTRALESIONAL; INTRAMUSCULAR; SOFT TISSUE at 13:30
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
## 2020-11-18 RX ORDER — LIDOCAINE HYDROCHLORIDE 20 MG/ML
5 INJECTION, SOLUTION INFILTRATION; PERINEURAL
Status: COMPLETED | OUTPATIENT
Start: 2020-11-16 | End: 2020-11-16

## 2020-11-18 RX ORDER — METHYLPREDNISOLONE ACETATE 80 MG/ML
80 INJECTION, SUSPENSION INTRA-ARTICULAR; INTRALESIONAL; INTRAMUSCULAR; SOFT TISSUE
Status: COMPLETED | OUTPATIENT
Start: 2020-11-16 | End: 2020-11-16

## 2020-12-09 ENCOUNTER — OFFICE VISIT (OUTPATIENT)
Dept: ORTHOPEDIC SURGERY | Facility: CLINIC | Age: 70
End: 2020-12-09

## 2020-12-09 VITALS
WEIGHT: 192 LBS | HEIGHT: 60 IN | SYSTOLIC BLOOD PRESSURE: 129 MMHG | BODY MASS INDEX: 37.69 KG/M2 | DIASTOLIC BLOOD PRESSURE: 70 MMHG | HEART RATE: 81 BPM | TEMPERATURE: 97.8 F

## 2020-12-09 DIAGNOSIS — S83.242D ACUTE MEDIAL MENISCUS TEAR OF LEFT KNEE, SUBSEQUENT ENCOUNTER: Primary | ICD-10-CM

## 2020-12-09 PROCEDURE — 99214 OFFICE O/P EST MOD 30 MIN: CPT | Performed by: ORTHOPAEDIC SURGERY
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
## 2020-12-10 PROBLEM — S83.242A ACUTE MEDIAL MENISCUS TEAR OF LEFT KNEE: Status: ACTIVE | Noted: 2020-12-10

## 2020-12-22 ENCOUNTER — OFFICE VISIT (OUTPATIENT)
Dept: CARDIOLOGY | Facility: CLINIC | Age: 70
End: 2020-12-22

## 2020-12-22 VITALS
SYSTOLIC BLOOD PRESSURE: 130 MMHG | TEMPERATURE: 96.9 F | HEIGHT: 60 IN | BODY MASS INDEX: 39.66 KG/M2 | WEIGHT: 202 LBS | HEART RATE: 69 BPM | OXYGEN SATURATION: 92 % | DIASTOLIC BLOOD PRESSURE: 61 MMHG

## 2020-12-22 DIAGNOSIS — R07.2 PRECORDIAL PAIN: Primary | ICD-10-CM

## 2020-12-22 DIAGNOSIS — R00.2 PALPITATIONS: ICD-10-CM

## 2020-12-22 DIAGNOSIS — E78.5 DYSLIPIDEMIA: ICD-10-CM

## 2020-12-22 PROCEDURE — 93000 ELECTROCARDIOGRAM COMPLETE: CPT | Performed by: PHYSICIAN ASSISTANT

## 2020-12-22 PROCEDURE — 99213 OFFICE O/P EST LOW 20 MIN: CPT | Performed by: PHYSICIAN ASSISTANT

## 2020-12-22 RX ORDER — METOPROLOL SUCCINATE 100 MG/1
100 TABLET, EXTENDED RELEASE ORAL DAILY
Qty: 90 TABLET | Refills: 2 | Status: SHIPPED | OUTPATIENT
Start: 2020-12-22 | End: 2021-06-28 | Stop reason: SDUPTHER

## 2020-12-22 RX ORDER — ISOSORBIDE MONONITRATE 120 MG/1
120 TABLET, EXTENDED RELEASE ORAL EVERY MORNING
Qty: 90 TABLET | Refills: 2 | Status: SHIPPED | OUTPATIENT
Start: 2020-12-22 | End: 2021-06-28 | Stop reason: SDUPTHER

## 2020-12-22 RX ORDER — NITROGLYCERIN 0.4 MG/1
0.4 TABLET SUBLINGUAL
Qty: 25 TABLET | Refills: 0 | Status: SHIPPED | OUTPATIENT
Start: 2020-12-22 | End: 2020-12-22 | Stop reason: SDUPTHER

## 2020-12-22 RX ORDER — NITROGLYCERIN 0.4 MG/1
0.4 TABLET SUBLINGUAL
Qty: 25 TABLET | Refills: 0 | Status: SHIPPED | OUTPATIENT
Start: 2020-12-22 | End: 2021-06-28 | Stop reason: SDUPTHER

## 2020-12-22 RX ORDER — FUROSEMIDE 20 MG/1
20 TABLET ORAL DAILY PRN
Qty: 30 TABLET | Refills: 1 | Status: SHIPPED | OUTPATIENT
Start: 2020-12-22 | End: 2021-06-28 | Stop reason: SDUPTHER

## 2020-12-22 RX ORDER — LISINOPRIL 5 MG/1
5 TABLET ORAL DAILY
Qty: 90 TABLET | Refills: 3 | Status: SHIPPED | OUTPATIENT
Start: 2020-12-22 | End: 2021-06-28 | Stop reason: SDUPTHER

## 2020-12-22 RX ORDER — ATORVASTATIN CALCIUM 10 MG/1
10 TABLET, FILM COATED ORAL DAILY
Qty: 90 TABLET | Refills: 2 | Status: SHIPPED | OUTPATIENT
Start: 2020-12-22 | End: 2021-06-28 | Stop reason: SDUPTHER

## 2020-12-22 NOTE — PROGRESS NOTES
Joan Vasquez, APRN  Julieta Holt  1950  12/22/2020    Patient Active Problem List   Diagnosis   • Palpitations   • Precordial pain   • Dyslipidemia   • SOB (shortness of breath)   • Trochanteric bursitis of left hip   • Primary osteoarthritis of left knee   • Acute medial meniscus tear of left knee       Dear Joan Vasquez, APRN:    Subjective     History of Present Illness:    Chief Complaint   Patient presents with   • Med Management     verbal.    • Chest Pain     3 susi in the past 3 months.    • Shortness of Breath     some.    • Numbness     hands only.    • Dizziness     some.    • Palpitations       Julieta Holt is a pleasant 70 y.o. female with a past medical history significant for no known history of coronary artery disease but does have history of prediabetes and history of tobacco abuse but quit in 2004 with 30-pack-year history.  She also has history of essential hypertension, dyslipidemia, and family history of coronary artery disease.    Patient reports overall she is doing ok. She does state that she has some chest pains but does admit that they have improved and occur only once monthly on average. She denies any worsening of her shortness of breath from baseline, she does admit to some mild palpitations that occasionally make her lightheaded but otherwise do not affect her actives of daily living she denies any full syncopal episodes.                Allergies   Allergen Reactions   • Erythromycin        bruise   :      Current Outpatient Medications:   •  alendronate (FOSAMAX) 70 MG tablet, Take 70 mg by mouth Every 7 (Seven) Days., Disp: , Rfl:   •  aspirin 81 MG tablet, Take 1 tablet by mouth Daily., Disp: 30 tablet, Rfl: 11  •  atorvastatin (LIPITOR) 10 MG tablet, Take 1 tablet by mouth Daily., Disp: 90 tablet, Rfl: 2  •  busPIRone (BUSPAR) 10 MG tablet, Take 10 mg by mouth 2 (Two) Times a Day., Disp: , Rfl:   •  Calcium Carbonate-Vit D-Min (Calcium 600+D3  Plus Minerals) 600-800 MG-UNIT tablet, Take  by mouth Daily., Disp: , Rfl:   •  cholecalciferol (VITAMIN D3) 1.25 MG (93925 UT) capsule, Take 50,000 Units by mouth 1 (One) Time Per Week., Disp: , Rfl:   •  diclofenac (VOLTAREN) 75 MG EC tablet, 75 mg 2 (Two) Times a Day., Disp: , Rfl:   •  DULoxetine (CYMBALTA) 30 MG capsule, 30 mg 2 (Two) Times a Day., Disp: , Rfl:   •  famotidine (PEPCID) 40 MG tablet, Take 40 mg by mouth Daily., Disp: , Rfl:   •  fluticasone (FLONASE) 50 MCG/ACT nasal spray, , Disp: , Rfl:   •  furosemide (LASIX) 20 MG tablet, Take 1 tablet by mouth Daily As Needed (increased shortness of breath, pedal edema, or weight gain >4 lbs in a day)., Disp: 30 tablet, Rfl: 1  •  gabapentin (NEURONTIN) 100 MG capsule, 100 mg Daily., Disp: , Rfl:   •  isosorbide mononitrate (IMDUR) 120 MG 24 hr tablet, Take 1 tablet by mouth Every Morning., Disp: 90 tablet, Rfl: 2  •  lisinopril (PRINIVIL,ZESTRIL) 5 MG tablet, Take 1 tablet by mouth Daily., Disp: 90 tablet, Rfl: 3  •  metoprolol succinate XL (TOPROL-XL) 100 MG 24 hr tablet, Take 1 tablet by mouth Daily., Disp: 90 tablet, Rfl: 2  •  nitroglycerin (NITROSTAT) 0.4 MG SL tablet, Place 1 tablet under the tongue Every 5 (Five) Minutes As Needed for Chest Pain. Take no more than 3 doses in 15 minutes., Disp: 25 tablet, Rfl: 0    The following portions of the patient's history were reviewed and updated as appropriate: allergies, current medications, past family history, past medical history, past social history, past surgical history and problem list.    Social History     Tobacco Use   • Smoking status: Former Smoker     Packs/day: 1.50     Types: Cigarettes     Quit date:      Years since quittin.9   • Smokeless tobacco: Never Used   • Tobacco comment: quit    Substance Use Topics   • Alcohol use: Yes     Comment: ocassionally   • Drug use: No       Review of Systems   Constitution: Negative for malaise/fatigue.   Cardiovascular: Positive for chest  "pain. Negative for dyspnea on exertion and irregular heartbeat.   Respiratory: Negative for cough and shortness of breath.    Hematologic/Lymphatic: Negative for bleeding problem. Does not bruise/bleed easily.   Gastrointestinal: Negative for nausea and vomiting.   Neurological: Negative for weakness.       Objective   Vitals:    12/22/20 1302   BP: 130/61   BP Location: Right arm   Patient Position: Sitting   Cuff Size: Adult   Pulse: 69   Temp: 96.9 °F (36.1 °C)   TempSrc: Infrared   SpO2: 92%   Weight: 91.6 kg (202 lb)   Height: 152.4 cm (60\")     Body mass index is 39.45 kg/m².    Constitutional:       General: Not in acute distress.     Appearance: Healthy appearance. Well-developed and not in distress. Not diaphoretic.   Eyes:      Conjunctiva/sclera: Conjunctivae normal.      Pupils: Pupils are equal, round, and reactive to light.   HENT:      Head: Normocephalic and atraumatic.   Neck:      Vascular: No carotid bruit or JVD.   Pulmonary:      Effort: Pulmonary effort is normal. No respiratory distress.      Breath sounds: Normal breath sounds.   Cardiovascular:      Normal rate. Regular rhythm.   Skin:     General: Skin is cool.   Neurological:      Mental Status: Alert, oriented to person, place, and time and oriented to person, place and time.         Lab Results   Component Value Date     12/01/2017    K 4.2 12/01/2017     12/01/2017    CO2 29.4 12/01/2017    BUN 20 12/01/2017    CREATININE 0.57 12/01/2017    GLUCOSE 103 12/01/2017    CALCIUM 9.6 12/01/2017    AST 17 12/01/2017    ALT 21 12/01/2017    ALKPHOS 111 (H) 12/01/2017     No results found for: CKTOTAL  Lab Results   Component Value Date    WBC 8.63 11/17/2017    HGB 13.7 11/17/2017    HCT 41.6 11/17/2017     11/17/2017     Lab Results   Component Value Date    INR 0.96 11/17/2017     No results found for: MG  Lab Results   Component Value Date    TRIG 76 12/01/2017    HDL 61 12/01/2017    LDL 92 12/01/2017      No results " found for: BNP    During this visit the following were done:  Labs Reviewed [x]    Labs Ordered []    Radiology Reports Reviewed [x]    Radiology Ordered []    PCP Records Reviewed []    Referring Provider Records Reviewed []    ER Records Reviewed []    Hospital Records Reviewed []    History Obtained From Family []    Radiology Images Reviewed []    Other Reviewed []    Records Requested []         ECG 12 Lead    Date/Time: 12/22/2020 12:57 PM  Performed by: Maxime Scott PA-C  Authorized by: Maxime Scott PA-C   Comparison: compared with previous ECG   Similar to previous ECG  Rhythm: sinus rhythm  Conduction: left bundle branch block    Clinical impression: non-specific ECG            Assessment/Plan    Diagnosis Plan   1. Precordial pain     2. Palpitations     3. Dyslipidemia              Recommendations:  1. Precordial pain  1. Patient symptoms have improved dramatically.  I did review left heart catheterization from [facility] hospital in 2018 which did show normal coronary arteries which would not make her chest pain most likely noncardiac given that she also had a normal nuclear stress test 6 months ago.  2. I will continue current therapy which includes aspirin, Lipitor, lisinopril, and metoprolol.  2. Palpitations  1. Patient with some mild palpitations do not appear to be affecting her actives of daily lifestyle we will continue current therapy.      Return in about 6 months (around 6/22/2021).    As always, I appreciate very much the opportunity to participate in the cardiovascular care of your patients.      With Best Regards,    Maxime Scott PA-C

## 2021-01-25 ENCOUNTER — TELEPHONE (OUTPATIENT)
Dept: ORTHOPEDIC SURGERY | Facility: CLINIC | Age: 71
End: 2021-01-25

## 2021-01-25 NOTE — TELEPHONE ENCOUNTER
Provider: PALMA SWIFT  Caller: ISHMAEL LOZA  Relationship to Patient: SELF    Phone Number: [phone]    Reason for Call: PATIENT CALLING TO CHECK STATUS OF CLEARANCE TO HAVE LEFT KNEE SURGERY FROM HER HEART DOCTOR    When was the patient last seen: 12/09/2020

## 2021-01-29 ENCOUNTER — TELEPHONE (OUTPATIENT)
Dept: ORTHOPEDIC SURGERY | Facility: CLINIC | Age: 71
End: 2021-01-29

## 2021-01-29 NOTE — TELEPHONE ENCOUNTER
Re faxed cardiac clearance form to Maxime Scott office on 01/26/2021.    Called the office today 01/29/2021, Maxime Scott PA-C will be back in the office on Monday will bring it to his attention and will let Ortho know.

## 2021-02-12 DIAGNOSIS — Z23 IMMUNIZATION DUE: ICD-10-CM

## 2021-02-17 ENCOUNTER — TELEPHONE (OUTPATIENT)
Dept: ORTHOPEDIC SURGERY | Facility: CLINIC | Age: 71
End: 2021-02-17

## 2021-02-17 ENCOUNTER — PREP FOR SURGERY (OUTPATIENT)
Dept: OTHER | Facility: HOSPITAL | Age: 71
End: 2021-02-17

## 2021-02-17 DIAGNOSIS — Z01.818 PREOPERATIVE TESTING: ICD-10-CM

## 2021-02-17 DIAGNOSIS — S83.242D ACUTE MEDIAL MENISCUS TEAR OF LEFT KNEE, SUBSEQUENT ENCOUNTER: Primary | ICD-10-CM

## 2021-02-17 NOTE — TELEPHONE ENCOUNTER
Caller: Yanet Julieta SHANDA    Relationship: Self    Best call back number: [phone]    What orders are you requesting (i.e. lab or imaging): PATIENT REQUESTING ORDER BE ENTERED FOR LEFT KNEE MENISCAL REPAIR SURGERY - CARDIAC CLEARANCE IS IN MEDIA TAB FROM BLANE HO PA-C     PATIENT AWARE SHE WILL PROBABLY NEED A PRE-OP APPT w/DR. SWIFT & PRE-ADMISSION TESTING AS WELL    In what timeframe would the patient need to come in: AVAILABLE ASAP - ANY DAY ANY TIME, NEEDS @ LEAST 1 DAY'S NOTICE     Where will you receive your lab/imaging services: [facility]     PATIENT CALL BACK [phone]     THANKS

## 2021-02-24 ENCOUNTER — OFFICE VISIT (OUTPATIENT)
Dept: ORTHOPEDIC SURGERY | Facility: CLINIC | Age: 71
End: 2021-02-24

## 2021-02-24 VITALS
SYSTOLIC BLOOD PRESSURE: 153 MMHG | BODY MASS INDEX: 39.65 KG/M2 | WEIGHT: 201.94 LBS | HEIGHT: 60 IN | TEMPERATURE: 97.1 F | DIASTOLIC BLOOD PRESSURE: 85 MMHG | HEART RATE: 76 BPM

## 2021-02-24 DIAGNOSIS — S83.242D ACUTE MEDIAL MENISCUS TEAR OF LEFT KNEE, SUBSEQUENT ENCOUNTER: Primary | ICD-10-CM

## 2021-02-24 PROCEDURE — 99214 OFFICE O/P EST MOD 30 MIN: CPT | Performed by: ORTHOPAEDIC SURGERY

## 2021-03-02 ENCOUNTER — LAB (OUTPATIENT)
Dept: LAB | Facility: HOSPITAL | Age: 71
End: 2021-03-02

## 2021-03-02 ENCOUNTER — APPOINTMENT (OUTPATIENT)
Dept: PREADMISSION TESTING | Facility: HOSPITAL | Age: 71
End: 2021-03-02

## 2021-03-02 DIAGNOSIS — S83.242D ACUTE MEDIAL MENISCUS TEAR OF LEFT KNEE, SUBSEQUENT ENCOUNTER: ICD-10-CM

## 2021-03-02 DIAGNOSIS — Z01.818 PREOPERATIVE TESTING: ICD-10-CM

## 2021-03-02 LAB
ANION GAP SERPL CALCULATED.3IONS-SCNC: 12.3 MMOL/L (ref 5–15)
BUN SERPL-MCNC: 22 MG/DL (ref 8–23)
BUN/CREAT SERPL: 25.3 (ref 7–25)
CALCIUM SPEC-SCNC: 10.1 MG/DL (ref 8.6–10.5)
CHLORIDE SERPL-SCNC: 101 MMOL/L (ref 98–107)
CO2 SERPL-SCNC: 22.7 MMOL/L (ref 22–29)
CREAT SERPL-MCNC: 0.87 MG/DL (ref 0.57–1)
DEPRECATED RDW RBC AUTO: 45.2 FL (ref 37–54)
ERYTHROCYTE [DISTWIDTH] IN BLOOD BY AUTOMATED COUNT: 13.9 % (ref 12.3–15.4)
GFR SERPL CREATININE-BSD FRML MDRD: 64 ML/MIN/1.73
GLUCOSE SERPL-MCNC: 110 MG/DL (ref 65–99)
HCT VFR BLD AUTO: 48.9 % (ref 34–46.6)
HGB BLD-MCNC: 14.9 G/DL (ref 12–15.9)
MCH RBC QN AUTO: 27.4 PG (ref 26.6–33)
MCHC RBC AUTO-ENTMCNC: 30.5 G/DL (ref 31.5–35.7)
MCV RBC AUTO: 89.9 FL (ref 79–97)
PLATELET # BLD AUTO: 289 10*3/MM3 (ref 140–450)
PMV BLD AUTO: 11.8 FL (ref 6–12)
POTASSIUM SERPL-SCNC: 4 MMOL/L (ref 3.5–5.2)
RBC # BLD AUTO: 5.44 10*6/MM3 (ref 3.77–5.28)
SODIUM SERPL-SCNC: 136 MMOL/L (ref 136–145)
WBC # BLD AUTO: 10.17 10*3/MM3 (ref 3.4–10.8)

## 2021-03-02 PROCEDURE — 36415 COLL VENOUS BLD VENIPUNCTURE: CPT

## 2021-03-02 PROCEDURE — U0005 INFEC AGEN DETEC AMPLI PROBE: HCPCS

## 2021-03-02 PROCEDURE — 85027 COMPLETE CBC AUTOMATED: CPT

## 2021-03-02 PROCEDURE — 80048 BASIC METABOLIC PNL TOTAL CA: CPT

## 2021-03-02 PROCEDURE — U0004 COV-19 TEST NON-CDC HGH THRU: HCPCS

## 2021-03-02 PROCEDURE — C9803 HOPD COVID-19 SPEC COLLECT: HCPCS

## 2021-03-02 NOTE — DISCHARGE INSTRUCTIONS
TAKE the following medications the morning of surgery:    All heart or blood pressure medications    Please discontinue all blood thinners and anticoagulants (except aspirin) prior to surgery as per your surgeon and cardiologist instructions.  Aspirin may be continued up to the day prior to surgery.    HOLD all diabetic medications the morning of surgery as order by physician.    Please follow instructions on use of prep cloths provided by nurse. Return instruction sheet to pre-op nurse on day of surgery.    Arrival time for surgery on 3/4/21 will be given to you by Dr. Velasquez's office.    A RESPONSIBLE PERSON MUST REMAIN IN THE WAITING ROOM DURING YOUR PROCEDURE AND A RESPONSIBLE  MUST BE AVAILABLE UPON YOUR DISCHARGE.    General Instructions:  • Do NOT eat or drink after midnight 3/2/21 which includes water, mints, or gum.  • You may brush your teeth. Dental appliances that are removable must be taken out day of surgery.  • Do NOT smoke, chew tobacco, or drink alcohol within 24 hours prior to surgery.  • Bring medications in original bottles, any inhalers and if applicable your C-PAP/BI-PAP machine  • Bring any papers given to you in the doctor’s office  • Wear clean, comfortable clothes and socks  • Do NOT wear contact lenses or make-up or dark nail polish.  Bring a case for your glasses if applicable.  • Bring crutches or walker if applicable  • Leave all other valuables and jewelry at home  • If you were given a blood bank armband, continue to wear it until discharged.    Preventing a Surgical Site Infection:  • Shower the night before surgery (unless instructed otherwise) using a fresh bar of anti-bacterial soap (such as Dial) and clean washcloth.  Dry with a clean towel and dress in clean clothing.  • For 2 to 3 days before surgery, avoid shaving with a razor near where you will have surgery because the razor can irritate skin and make it easier to develop an infection.  Ask your surgeon if you will  be receiving antibiotics prior to surgery.  • Make sure you, your family, and all healthcare providers clean their hands with soap and water or an alcohol-based hand  before caring for you or your wound.  • If at all possible, quit smoking as many days before surgery as you can.    Day of Surgery:  Upon arrival, a pre-op nurse and anesthesiologist will review your health history, obtain vital signs, and answer questions you may have.  The only belongings needed at this time will be your home medications and if applicable you C-PAP/BI-PAP machine.  If you are staying overnight, your family can leave the rest of your belongings in the car and bring them to your room later.  A pre-op nurse will start an IV and you may receive medication in preparation for surgery.  Due to patient privacy and limited space, only one member of your family will be able to accompany you in the pre-op area.  While you are in surgery your family should notify the waiting room  if they leave the waiting room area and provide a contact number.  Please be aware that surgery does come with discomfort.  We want to make every effort to control your discomfort so please discuss any uncontrolled symptoms with your nurse.  Your doctor will most likely have prescribed pain medications.  If you are going home after surgery you will receive individualized written care instructions before being discharged.  A responsible adult must drive you to and from the hospital on the day of surgery and stay with you for 24 hours.  If you are staying overnight following surgery, you will be transported to your hospital room following the recovery period.

## 2021-03-03 LAB — SARS-COV-2 RNA RESP QL NAA+PROBE: NOT DETECTED

## 2021-03-04 ENCOUNTER — ANESTHESIA (OUTPATIENT)
Dept: PERIOP | Facility: HOSPITAL | Age: 71
End: 2021-03-04

## 2021-03-04 ENCOUNTER — ANESTHESIA EVENT (OUTPATIENT)
Dept: PERIOP | Facility: HOSPITAL | Age: 71
End: 2021-03-04

## 2021-03-04 ENCOUNTER — APPOINTMENT (OUTPATIENT)
Dept: GENERAL RADIOLOGY | Facility: HOSPITAL | Age: 71
End: 2021-03-04

## 2021-03-04 ENCOUNTER — HOSPITAL ENCOUNTER (OUTPATIENT)
Facility: HOSPITAL | Age: 71
Setting detail: HOSPITAL OUTPATIENT SURGERY
Discharge: HOME | End: 2021-03-04
Attending: ORTHOPAEDIC SURGERY | Admitting: ORTHOPAEDIC SURGERY

## 2021-03-04 VITALS
DIASTOLIC BLOOD PRESSURE: 73 MMHG | RESPIRATION RATE: 18 BRPM | SYSTOLIC BLOOD PRESSURE: 105 MMHG | BODY MASS INDEX: 39.07 KG/M2 | TEMPERATURE: 97.9 F | HEIGHT: 60 IN | OXYGEN SATURATION: 95 % | HEART RATE: 78 BPM | WEIGHT: 199 LBS

## 2021-03-04 DIAGNOSIS — S83.242D ACUTE MEDIAL MENISCUS TEAR OF LEFT KNEE, SUBSEQUENT ENCOUNTER: ICD-10-CM

## 2021-03-04 PROCEDURE — 25010000002 FENTANYL CITRATE (PF) 100 MCG/2ML SOLUTION: Performed by: NURSE ANESTHETIST, CERTIFIED REGISTERED

## 2021-03-04 PROCEDURE — 29881 ARTHRS KNE SRG MNISECTMY M/L: CPT | Performed by: ORTHOPAEDIC SURGERY

## 2021-03-04 PROCEDURE — 25010000002 PROPOFOL 10 MG/ML EMULSION: Performed by: NURSE ANESTHETIST, CERTIFIED REGISTERED

## 2021-03-04 PROCEDURE — 25010000002 ONDANSETRON PER 1 MG: Performed by: NURSE ANESTHETIST, CERTIFIED REGISTERED

## 2021-03-04 RX ORDER — ONDANSETRON 2 MG/ML
INJECTION INTRAMUSCULAR; INTRAVENOUS AS NEEDED
Status: DISCONTINUED | OUTPATIENT
Start: 2021-03-04 | End: 2021-03-04 | Stop reason: SURG

## 2021-03-04 RX ORDER — FENTANYL CITRATE 50 UG/ML
50 INJECTION, SOLUTION INTRAMUSCULAR; INTRAVENOUS
Status: DISCONTINUED | OUTPATIENT
Start: 2021-03-04 | End: 2021-03-04 | Stop reason: HOSPADM

## 2021-03-04 RX ORDER — DROPERIDOL 2.5 MG/ML
0.62 INJECTION, SOLUTION INTRAMUSCULAR; INTRAVENOUS ONCE AS NEEDED
Status: DISCONTINUED | OUTPATIENT
Start: 2021-03-04 | End: 2021-03-04 | Stop reason: HOSPADM

## 2021-03-04 RX ORDER — OXYCODONE HYDROCHLORIDE AND ACETAMINOPHEN 5; 325 MG/1; MG/1
1 TABLET ORAL ONCE AS NEEDED
Status: DISCONTINUED | OUTPATIENT
Start: 2021-03-04 | End: 2021-03-04 | Stop reason: HOSPADM

## 2021-03-04 RX ORDER — SODIUM CHLORIDE, SODIUM LACTATE, POTASSIUM CHLORIDE, CALCIUM CHLORIDE 600; 310; 30; 20 MG/100ML; MG/100ML; MG/100ML; MG/100ML
INJECTION, SOLUTION INTRAVENOUS CONTINUOUS PRN
Status: DISCONTINUED | OUTPATIENT
Start: 2021-03-04 | End: 2021-03-04 | Stop reason: SURG

## 2021-03-04 RX ORDER — ONDANSETRON 2 MG/ML
4 INJECTION INTRAMUSCULAR; INTRAVENOUS AS NEEDED
Status: DISCONTINUED | OUTPATIENT
Start: 2021-03-04 | End: 2021-03-04 | Stop reason: HOSPADM

## 2021-03-04 RX ORDER — FENTANYL CITRATE 50 UG/ML
INJECTION, SOLUTION INTRAMUSCULAR; INTRAVENOUS AS NEEDED
Status: DISCONTINUED | OUTPATIENT
Start: 2021-03-04 | End: 2021-03-04 | Stop reason: SURG

## 2021-03-04 RX ORDER — IPRATROPIUM BROMIDE AND ALBUTEROL SULFATE 2.5; .5 MG/3ML; MG/3ML
3 SOLUTION RESPIRATORY (INHALATION) ONCE AS NEEDED
Status: DISCONTINUED | OUTPATIENT
Start: 2021-03-04 | End: 2021-03-04 | Stop reason: HOSPADM

## 2021-03-04 RX ORDER — BUPIVACAINE HYDROCHLORIDE 5 MG/ML
INJECTION, SOLUTION PERINEURAL AS NEEDED
Status: DISCONTINUED | OUTPATIENT
Start: 2021-03-04 | End: 2021-03-04 | Stop reason: HOSPADM

## 2021-03-04 RX ORDER — MEPERIDINE HYDROCHLORIDE 25 MG/ML
12.5 INJECTION INTRAMUSCULAR; INTRAVENOUS; SUBCUTANEOUS
Status: DISCONTINUED | OUTPATIENT
Start: 2021-03-04 | End: 2021-03-04 | Stop reason: HOSPADM

## 2021-03-04 RX ORDER — SODIUM CHLORIDE, SODIUM LACTATE, POTASSIUM CHLORIDE, CALCIUM CHLORIDE 600; 310; 30; 20 MG/100ML; MG/100ML; MG/100ML; MG/100ML
100 INJECTION, SOLUTION INTRAVENOUS ONCE AS NEEDED
Status: DISCONTINUED | OUTPATIENT
Start: 2021-03-04 | End: 2021-03-04 | Stop reason: HOSPADM

## 2021-03-04 RX ORDER — PROPOFOL 10 MG/ML
VIAL (ML) INTRAVENOUS AS NEEDED
Status: DISCONTINUED | OUTPATIENT
Start: 2021-03-04 | End: 2021-03-04 | Stop reason: SURG

## 2021-03-04 RX ORDER — KETOROLAC TROMETHAMINE 30 MG/ML
15 INJECTION, SOLUTION INTRAMUSCULAR; INTRAVENOUS EVERY 6 HOURS PRN
Status: DISCONTINUED | OUTPATIENT
Start: 2021-03-04 | End: 2021-03-04 | Stop reason: HOSPADM

## 2021-03-04 RX ORDER — MAGNESIUM HYDROXIDE 1200 MG/15ML
LIQUID ORAL AS NEEDED
Status: DISCONTINUED | OUTPATIENT
Start: 2021-03-04 | End: 2021-03-04 | Stop reason: HOSPADM

## 2021-03-04 RX ORDER — HYDROCODONE BITARTRATE AND ACETAMINOPHEN 7.5; 325 MG/1; MG/1
1-2 TABLET ORAL EVERY 4 HOURS PRN
Qty: 12 TABLET | Refills: 0 | Status: ON HOLD | OUTPATIENT
Start: 2021-03-04 | End: 2021-09-16

## 2021-03-04 RX ORDER — FAMOTIDINE 10 MG/ML
INJECTION, SOLUTION INTRAVENOUS AS NEEDED
Status: DISCONTINUED | OUTPATIENT
Start: 2021-03-04 | End: 2021-03-04 | Stop reason: SURG

## 2021-03-04 RX ADMIN — FENTANYL CITRATE 50 MCG: 50 INJECTION INTRAMUSCULAR; INTRAVENOUS at 08:57

## 2021-03-04 RX ADMIN — FAMOTIDINE 20 MG: 10 INJECTION INTRAVENOUS at 08:53

## 2021-03-04 RX ADMIN — FENTANYL CITRATE 50 MCG: 50 INJECTION INTRAMUSCULAR; INTRAVENOUS at 08:53

## 2021-03-04 RX ADMIN — FENTANYL CITRATE 50 MCG: 50 INJECTION INTRAMUSCULAR; INTRAVENOUS at 09:36

## 2021-03-04 RX ADMIN — ONDANSETRON 4 MG: 2 INJECTION INTRAMUSCULAR; INTRAVENOUS at 08:53

## 2021-03-04 RX ADMIN — PROPOFOL 120 MG: 10 INJECTION, EMULSION INTRAVENOUS at 08:57

## 2021-03-04 RX ADMIN — SODIUM CHLORIDE, POTASSIUM CHLORIDE, SODIUM LACTATE AND CALCIUM CHLORIDE: 600; 310; 30; 20 INJECTION, SOLUTION INTRAVENOUS at 08:53

## 2021-03-04 RX ADMIN — FENTANYL CITRATE 50 MCG: 50 INJECTION INTRAMUSCULAR; INTRAVENOUS at 09:38

## 2021-03-04 NOTE — ANESTHESIA PROCEDURE NOTES
Airway  Urgency: elective    Date/Time: 3/4/2021 8:53 AM  Airway not difficult    General Information and Staff    Patient location during procedure: OR  Anesthesiologist: Maximiliano Camilo DO  CRNA: Earnest Stratton CRNA    Indications and Patient Condition    Preoxygenated: yes  MILS not maintained throughout  Mask difficulty assessment: 0 - not attempted    Final Airway Details  Final airway type: supraglottic airway      Successful airway: unique  Size 4    Number of attempts at approach: 1  Assessment: lips, teeth, and gum same as pre-op and atraumatic intubation    Additional Comments  Atraumatic LMA placement, dentition unchanged.

## 2021-03-04 NOTE — ANESTHESIA POSTPROCEDURE EVALUATION
Patient: Julieta Holt    Procedure Summary     Date: 03/04/21 Room / Location:  [facility] 03 /  [facility]    Anesthesia Start: 0853 Anesthesia Stop: 0951    Procedure: LEFT KNEE ARTHROSCOPY WITH PARTIAL MEDIAL MENISCECTOMY, CHONDROPLASTY (Left Knee) Diagnosis:       Acute medial meniscus tear of left knee, subsequent encounter      (Acute medial meniscus tear of left knee, subsequent encounter [S83.242D])    Surgeon: Bayron Velasquez MD Provider: Maximiliano Camilo DO    Anesthesia Type: general ASA Status: 2          Anesthesia Type: general    Vitals  Vitals Value Taken Time   /63 03/04/21 1022   Temp 98 °F (36.7 °C) 03/04/21 0952   Pulse 74 03/04/21 1022   Resp 13 03/04/21 1022   SpO2 94 % 03/04/21 1022           Post Anesthesia Care and Evaluation    Patient location during evaluation: PHASE II  Patient participation: complete - patient participated  Level of consciousness: awake and alert  Pain score: 1  Pain management: adequate  Airway patency: patent  Anesthetic complications: No anesthetic complications  PONV Status: controlled  Cardiovascular status: acceptable  Respiratory status: acceptable  Hydration status: acceptable

## 2021-03-04 NOTE — OP NOTE
KNEE ARTHROSCOPY WITH CHONDROPLASTY  Procedure Note    Julieta Holt  3/4/2021    Pre-op Diagnosis:   Acute medial meniscus tear of left knee, subsequent encounter [S83.242D]    Post-op Diagnosis:     Post-Op Diagnosis Codes:     * Acute medial meniscus tear of left knee, subsequent encounter [S83.242D]  Osteoarthritis left knee  Procedure(s):  LEFT KNEE ARTHROSCOPY WITH PARTIAL MEDIAL MENISCECTOMY, CHONDROPLASTY medial femoral condyle    Surgeon(s):  Bayron Velasquez MD    Anesthesia: General/local    Operative technique: With patient in the operating theatre under general anesthesia with left leg placed in leg farmer and tourniquet the left lower extremity sterilely prepped draped usual manner exsanguinated tourniquet inflated to 250 mmHg.  With standard arthroscopy portals created and the knee inflated with sterile saline arthroscope was introduced.  Patellofemoral joint with minimal degenerative changes.  Medial compartment entered and complex tear involving the posterior horn of the medial meniscus was identified delineated with probe removed piecemeal alternating with punch and patellar shaver debriding to stable rim.  Medial femoral condyle gently debrided with 3.5 nonaggressive shaver.  Intercondylar notch revealed intact ACL lateral compartment essentially unremarkable with lateral meniscus stable by probing.  Lateral gutter was clean.  Instruments were removed from the knee water expressed portals injected with 15 cc of 0.5 Marcaine wounds closed with 3-0 nylon sterile dressing applied she was taken to recovery room in stable condition.    Staff:   Circulator: Walter Miller RN  Scrub Person: Marilin Mcneil  Documenter: Yfn Ford RN  Assistant: Leif Rowan    Estimated Blood Loss: none    Specimens:   none               Implants/Grafts: none      Drains: None    Complications: none    Tourniquet time: 28   min    Bayron Velasquez MD     Date: 3/4/2021  Time:  09:43 EST    Cc: Joan Vasquez, APRN

## 2021-03-04 NOTE — ANESTHESIA PREPROCEDURE EVALUATION
Anesthesia Evaluation     Patient summary reviewed and Nursing notes reviewed   history of anesthetic complications:               Airway   Mallampati: II  TM distance: >3 FB  Neck ROM: limited  possible difficult intubation  Dental - normal exam     Pulmonary - normal exam   (+) COPD, shortness of breath,   Cardiovascular - normal exam  Exercise tolerance: good (4-7 METS)    NYHA Classification: II    (+) hypertension, valvular problems/murmurs, dysrhythmias, hyperlipidemia,       Neuro/Psych  (+) seizures, psychiatric history,     GI/Hepatic/Renal/Endo - negative ROS     Musculoskeletal     Abdominal  - normal exam    Bowel sounds: normal.   Substance History - negative use     OB/GYN negative ob/gyn ROS         Other   arthritis,                        Anesthesia Plan    ASA 2     general     intravenous induction     Anesthetic plan, all risks, benefits, and alternatives have been provided, discussed and informed consent has been obtained with: patient.    Plan discussed with CRNA.

## 2021-03-10 ENCOUNTER — OFFICE VISIT (OUTPATIENT)
Dept: ORTHOPEDIC SURGERY | Facility: CLINIC | Age: 71
End: 2021-03-10

## 2021-03-10 VITALS — WEIGHT: 198 LBS | BODY MASS INDEX: 38.87 KG/M2 | HEIGHT: 60 IN | TEMPERATURE: 98.6 F

## 2021-03-10 DIAGNOSIS — M62.81 QUADRICEPS WEAKNESS: ICD-10-CM

## 2021-03-10 DIAGNOSIS — Z09 POSTOP CHECK: ICD-10-CM

## 2021-03-10 DIAGNOSIS — Z98.890 S/P ARTHROSCOPIC PARTIAL MEDIAL MENISCECTOMY: Primary | ICD-10-CM

## 2021-03-10 PROCEDURE — 99024 POSTOP FOLLOW-UP VISIT: CPT | Performed by: ORTHOPAEDIC SURGERY
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
## 2021-03-11 PROBLEM — Z98.890 S/P ARTHROSCOPIC PARTIAL MEDIAL MENISCECTOMY: Status: ACTIVE | Noted: 2021-03-11

## 2021-03-15 ENCOUNTER — TREATMENT (OUTPATIENT)
Dept: PHYSICAL THERAPY | Facility: CLINIC | Age: 71
End: 2021-03-15

## 2021-03-15 DIAGNOSIS — M62.81 QUADRICEPS WEAKNESS: ICD-10-CM

## 2021-03-15 DIAGNOSIS — Z98.890 S/P ARTHROSCOPIC PARTIAL MEDIAL MENISCECTOMY: Primary | ICD-10-CM

## 2021-03-15 PROCEDURE — 97110 THERAPEUTIC EXERCISES: CPT | Performed by: PHYSICAL THERAPIST

## 2021-03-15 PROCEDURE — G0283 ELEC STIM OTHER THAN WOUND: HCPCS | Performed by: PHYSICAL THERAPIST

## 2021-03-15 PROCEDURE — 97162 PT EVAL MOD COMPLEX 30 MIN: CPT | Performed by: PHYSICAL THERAPIST

## 2021-03-15 NOTE — PROGRESS NOTES
"  Physical Therapy Initial Evaluation and Plan of Care      Patient: Julieta Holt   : 1950  Diagnosis/ICD-10 Code:  S/P arthroscopic partial medial meniscectomy [Z98.890]  Referring practitioner: Bayron Velasquez, *  Date of Initial Visit: 3/15/2021  Today's Date: 3/15/2021  Patient seen for 1 sessions    Visit Diagnoses:    ICD-10-CM ICD-9-CM   1. S/P arthroscopic partial medial meniscectomy  Z98.890 V45.89   2. Quadriceps weakness  M62.81 728.87            Subjective Questionnaire: LEFS:       Subjective Evaluation    History of Present Illness  Date of surgery: 3/4/2021  Mechanism of injury: The patient reports undergoing a partial medial meniscectomy on the left knee on 3/4/2021. She states she is unsure when she injured the knee. The patient reports she is \"doing good and not using her walker anymore\". She states she has difficulty with prolonged standing, initiating walking, bathing/dressing, and other daily activities. She states the greatest amount of pain when \"I first stand and try to start walking\". The patient reports she is unable to stand for prolonged periods of time, stating she has bought \"meals to go in the microwave\". She reports difficulty getting into and out of the car due to decreased mobility. She states she returns to the MD in approximately two months.       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  Patient Goals  Patient goals for therapy: decreased pain, improved balance, increased motion, increased strength and return to sport/leisure activities             Objective          Observations "     Additional Knee Observation Details  No redness, warmth of the left lower extremity.     Palpation   Left   Tenderness of the distal biceps femoris, distal semimembranosus, distal semitendinosus, lateral gastrocnemius and medial gastrocnemius.     Tenderness   Left Knee   Tenderness in the lateral joint line, medial joint line and superior patella. No tenderness in the fibular head, ITB, LCL (distal), LCL (proximal), MCL (distal), MCL (proximal), patellar tendon, quadriceps tendon and tibial tubercle.     Active Range of Motion   Left Knee   Flexion: 108 degrees   Extensor la degrees     Additional Active Range of Motion Details  Lacks 12 degrees from achieving full extension.    Patellar Mobility   Left Knee Patellar tendons within functional limits include the medial, lateral, superior and inferior.     Strength/Myotome Testing     Left Knee   Flexion: 4-  Extension: 3+    Right Knee   Flexion: 4+  Extension: 4+    Swelling     Left Knee Girth Measurement (cm)   Joint line: 47 cm    Right Knee Girth Measurement (cm)   Joint line: 45 cm          Assessment & Plan     Assessment  Impairments: abnormal gait, abnormal muscle firing, abnormal or restricted ROM, activity intolerance, impaired balance, impaired physical strength, lacks appropriate home exercise program, pain with function, safety issue and weight-bearing intolerance  Assessment details: The patient is a 70 year old female presenting to the clinic s/p left partial medial menisectomy. She demonstrated impaired left knee ROM, left lower extremity weakness, and mild left knee swelling. She ambulated with decreased left weight shift, decreased left stance phase, left hip external rotation, and decreased extension with heel strike. The patient reported 30/80 on the LEFS, reporting difficulty with prolonged standing and walking activities. She performed therapeutic exercises addressed left knee ROM and strength, with no increase in pain reported. A HEP  was reviewed with the patient, with patient verbalizing and demonstrating proper understanding. Cryotherapy combined with pre-mod was applied to the medial aspect of the left knee, with no skin irritation observed. She would benefit from skilled physical therapy to address functional limitations and impairments.      Prognosis: good  Functional Limitations: lifting, walking, pulling, pushing, uncomfortable because of pain, moving in bed, sitting, standing and unable to perform repetitive tasks  Goals  Plan Goals: SHORT TERM GOALS:  2 weeks       1. Pt will be instructed in HEP for improved independence.  2. Pt to demonstrate 4/5 left knee strength to improve stability with ambulation  3.  Pt will report at least 40/80 on LEFS for improved independence.  4. Pt to report being able to walk for 10 minutes without increasing pain in the left knee for improved community involvement.  5. Pt will demonstrate full left knee extension for improved gait.  6. Pt will report the ability to stand 20 minutes for improved ability to cook meals.    LONG TERM GOALS:   6 weeks  1. Pt will report at least 60/80 on LEFS for improved independence.  2. Pt to demonstrate left knee strength to 4+/5 or greater to improve safety with ambulation on uneven surfaces.  3. Pt to report being able to walk for 30 minutes without increasing pain in the left knee for improved community involvement.  4. Pt will report no more than 2/10 left knee pain for improved function.  5. Pt will demonstrate 0-130 left knee ROM for improved gait.      Plan  Therapy options: will be seen for skilled physical therapy services  Planned modality interventions: iontophoresis, cryotherapy, electrical stimulation/Mexican stimulation, ultrasound and thermotherapy (hydrocollator packs)  Planned therapy interventions: manual therapy, motor coordination training, neuromuscular re-education, postural training, strengthening, stretching, therapeutic activities, joint  mobilization, home exercise program, gait training, functional ROM exercises, abdominal trunk stabilization, flexibility, soft tissue mobilization and spinal/joint mobilization  Duration in visits: 20  Duration in weeks: 12  Treatment plan discussed with: patient  Plan details: Pt will be re-assessed upon follow up for tolerance to pain relieving exercise program.     Moderate Evaluation  40523  Re-evaluation   36504    Therapeutic exercise  72349  Therapeutic activity    75932  Neuromuscular re-education   06605  Manual therapy   43154  Gait training  25080    Attended e-stim  02472  Unattended e-stim (Medicaid/Medicare)     Moist heat/cryotherapy 60292   Ultrasound   28866  Iontophoresis   03062            Manual Therapy:         mins  94745;  Therapeutic Exercise:    10     mins  25095;     Neuromuscular Linda:        mins  46023;    Therapeutic Activity:          mins  65590;     Gait Training:           mins  60069;     Ultrasound:          mins  07712;    Electrical Stimulation:   10      mins  83689 ( );  Dry Needling          mins self-pay    Timed Treatment:   10   mins   Total Treatment:     50   mins    PT SIGNATURE: Ashley Claudene Dalton, PT   DATE TREATMENT INITIATED: 3/15/2021    Initial Certification  Certification Period: 6/13/2021  I certify that the therapy services are furnished while this patient is under my care.  The services outlined above are required by this patient, and will be reviewed every 90 days.     PHYSICIAN: Bayron Velasquez MD      DATE:     Please sign and return via fax to [phone].. Thank you, [facility].

## 2021-03-18 ENCOUNTER — TREATMENT (OUTPATIENT)
Dept: PHYSICAL THERAPY | Facility: CLINIC | Age: 71
End: 2021-03-18

## 2021-03-18 DIAGNOSIS — Z98.890 S/P ARTHROSCOPIC PARTIAL MEDIAL MENISCECTOMY: Primary | ICD-10-CM

## 2021-03-18 DIAGNOSIS — M62.81 QUADRICEPS WEAKNESS: ICD-10-CM

## 2021-03-18 PROCEDURE — 97110 THERAPEUTIC EXERCISES: CPT | Performed by: PHYSICAL THERAPIST

## 2021-03-18 PROCEDURE — G0283 ELEC STIM OTHER THAN WOUND: HCPCS | Performed by: PHYSICAL THERAPIST

## 2021-03-18 NOTE — PROGRESS NOTES
Physical Therapy Daily Treatment Note      Patient: Julieta Holt   : 1950  Referring practitioner: Bayron Velasquez, *  Date of Initial Visit: Type: THERAPY  Noted: 3/15/2021  Today's Date: 3/18/2021  Patient seen for 2 sessions         Subjective:  Patient arrives to therapy w/ reports of 7/10 left knee pain.  Pt states she feels her pain is slightly increased duet to the rainy weather.      Objective   See Exercise, Manual, and Modality Logs for complete treatment.       Assessment/Plan:  Patient responded well to today's session w/ reports of decreased knee pain following, 3/10.  Treatment initiated w/ therex as listed for improved left knee ROM, LE strength, and stability f/b conclusion of Estim w/ cryotherapy.  Patella PROM performed as to pt's tolerance.  Pt required cues and demonstration while performing exercise for proper form and for max benefit w/ activities.  Pt educated on importance of allowing knee to bend during gait, as tolerated.  Pt also educated on utilization of cryotherapy at home to assist w/ swelling and pain control.  Pt verbalized understanding.  Pt continues to benefit from therapy services and will be progressed as tolerated.  Continue w/ PT's POC.     Visit Diagnoses:    ICD-10-CM ICD-9-CM   1. S/P arthroscopic partial medial meniscectomy  Z98.890 V45.89   2. Quadriceps weakness  M62.81 728.87       Progress per Plan of Care and Progress strengthening /stabilization /functional activity           Timed:  Manual Therapy:         mins  63283;  Therapeutic Exercise:    39     mins  31315;     Neuromuscular Linda:        mins  59791;    Therapeutic Activity:          mins  56311;     Gait Training:           mins  45141;     Ultrasound:          mins  43335;    Electrical Stimulation:         mins  83544 ( );    Untimed:  Electrical Stimulation:    10     mins  16699 ( );  Mechanical Traction:         mins  48387;     Timed Treatment:  39    mins    Total Treatment:    49    mins  Sophie Lazaro. HERI Rosa  Physical Therapist

## 2021-03-22 ENCOUNTER — TREATMENT (OUTPATIENT)
Dept: PHYSICAL THERAPY | Facility: CLINIC | Age: 71
End: 2021-03-22

## 2021-03-22 DIAGNOSIS — M62.81 QUADRICEPS WEAKNESS: ICD-10-CM

## 2021-03-22 DIAGNOSIS — Z98.890 S/P ARTHROSCOPIC PARTIAL MEDIAL MENISCECTOMY: Primary | ICD-10-CM

## 2021-03-22 PROCEDURE — 97110 THERAPEUTIC EXERCISES: CPT | Performed by: PHYSICAL THERAPIST

## 2021-03-22 NOTE — PROGRESS NOTES
Physical Therapy Daily Treatment Note      Patient: Julieta Holt   : 1950  Referring practitioner: Bayron Velasquez, *  Date of Initial Visit: Type: THERAPY  Noted: 3/15/2021  Today's Date: 3/22/2021  Patient seen for 3 sessions           Subjective Evaluation    History of Present Illness    Subjective comment: Patient states that she has decreased knee pain today.  Patient notes that she has been performing HEP with no complications.Pain  Current pain rating: 3           Objective   See Exercise, Manual, and Modality Logs for complete treatment.       Assessment & Plan     Assessment  Assessment details: Therapy session consisted of there ex and cryotherapy; patient reported no change in pain at conclusion of treatment session.  There ex progressed to include increased repetitions and the addition of LE bicycle.  Patient was instructed to perform exercises per her tolerance, with patient verbalizing understanding.  While ambulating in clinic, patient was observed to ambulate with minimal knee flexion; patient was instructed to ambulate with increased knee flexion.  Patient will continue to be progressed per her tolerance and POC.        Visit Diagnoses:    ICD-10-CM ICD-9-CM   1. S/P arthroscopic partial medial meniscectomy  Z98.890 V45.89   2. Quadriceps weakness  M62.81 728.87       Progress per Plan of Care and Progress strengthening /stabilization /functional activity           Timed:  Manual Therapy:         mins  50554;  Therapeutic Exercise:    43     mins  83983;     Neuromuscular Linda:        mins  94046;    Therapeutic Activity:          mins  68201;     Gait Training:           mins  90571;     Ultrasound:          mins  68722;    Electrical Stimulation:         mins  03400 ( );    Untimed:  Electrical Stimulation:         mins  03950 ( );  Mechanical Traction:         mins  91073;   Ice-5 min    Timed Treatment:   43   mins   Total Treatment:     48    radha Mora, PT  Physical Therapist

## 2021-03-25 ENCOUNTER — TELEPHONE (OUTPATIENT)
Dept: ORTHOPEDIC SURGERY | Facility: CLINIC | Age: 71
End: 2021-03-25

## 2021-03-25 ENCOUNTER — TREATMENT (OUTPATIENT)
Dept: PHYSICAL THERAPY | Facility: CLINIC | Age: 71
End: 2021-03-25

## 2021-03-25 ENCOUNTER — TELEPHONE (OUTPATIENT)
Dept: ADMINISTRATIVE | Facility: HOSPITAL | Age: 71
End: 2021-03-25

## 2021-03-25 DIAGNOSIS — Z98.890 S/P ARTHROSCOPIC PARTIAL MEDIAL MENISCECTOMY: Primary | ICD-10-CM

## 2021-03-25 DIAGNOSIS — M62.81 QUADRICEPS WEAKNESS: ICD-10-CM

## 2021-03-25 DIAGNOSIS — S89.92XS INJURY OF LEFT KNEE, SEQUELA: ICD-10-CM

## 2021-03-25 NOTE — PROGRESS NOTES
Physical Therapy Daily Treatment Note      Patient: Julieta Holt   : 1950  Referring practitioner: Bayron Velasquez, *  Date of Initial Visit: Type: THERAPY  Noted: 3/15/2021  Today's Date: 3/25/2021  Patient seen for 4 sessions           Subjective Evaluation    History of Present Illness    Subjective comment: Patient reports that she was walking out of her bathroom and stepped on a soft spot in the floor.  She notes that her left leg went through the floor.  She states that her leg is scraped up and has a slight increase in swelling at the left knee.  She reports that it does feel harder to straighten her knee, but notes no new difficulty with bending her knee.  She denies any difficulty with weight bearing.  Patient notes that her knee feels more sore than it has been.Pain  Current pain ratin           Objective          Active Range of Motion   Left Knee   Flexion: 104 degrees   Extension: Left knee active extension: lacking 9 degrees from full extension.     Swelling     Left Knee Girth Measurement (cm)   Joint line: 47 cm      See Exercise, Manual, and Modality Logs for complete treatment.       Assessment & Plan     Assessment  Assessment details: Patient assessed secondary to fall.  Patient displayed decreased AROM knee flexion and noted tenderness with palpation at medial/lateral knee.  Abrasions noted at left shin; however, no drainage was noted.  Patient continues to ambulate with antalgic gait.  Referring physician's office contacted and notified of patient's fall; they reported that referring physician was out of the office until Monday.  MD's office instructed for patient to call referring physician's office Monday if she continues to have increased pain or decreased mobility.  Patient was educated to seek immediate medical symptoms if symptoms worsen prior to Monday; she verbalized understanding and noted that she was going to Urgent Care today to have abrasions  checked.  Therapy held today; will resume therapy once pain has decreased or she has been cleared by referring MD's office.        Visit Diagnoses:    ICD-10-CM ICD-9-CM   1. S/P arthroscopic partial medial meniscectomy  Z98.890 V45.89   2. Quadriceps weakness  M62.81 728.87       Progress per Plan of Care           Timed:  Manual Therapy:         mins  12829;  Therapeutic Exercise:         mins  07618;     Neuromuscular Linda:        mins  88925;    Therapeutic Activity:          mins  43786;     Gait Training:           mins  17667;     Ultrasound:          mins  76551;    Electrical Stimulation:         mins  42368 ( );    Untimed:  Electrical Stimulation:         mins  54624 ( );  Mechanical Traction:         mins  38712;     Timed Treatment:      mins   Total Treatment:     20   mins  Elle Mora, PT  Physical Therapist

## 2021-03-25 NOTE — TELEPHONE ENCOUNTER
Elle from PT called today stating Julieta is there for treatment and that the patient has fallen through her floor and has some swelling in her left knee.. Patient has been scheduled to follow up with Dr. Velasquez 3-29-21@9:30, the next time he is in the office.     3-4-21 L KNEE ARTHROSCOPY WITH CHONDROPLASTY

## 2021-03-29 ENCOUNTER — OFFICE VISIT (OUTPATIENT)
Dept: ORTHOPEDIC SURGERY | Facility: CLINIC | Age: 71
End: 2021-03-29

## 2021-03-29 ENCOUNTER — TREATMENT (OUTPATIENT)
Dept: PHYSICAL THERAPY | Facility: CLINIC | Age: 71
End: 2021-03-29

## 2021-03-29 ENCOUNTER — HOSPITAL ENCOUNTER (OUTPATIENT)
Dept: GENERAL RADIOLOGY | Facility: HOSPITAL | Age: 71
Discharge: HOME | End: 2021-03-29
Admitting: ORTHOPAEDIC SURGERY

## 2021-03-29 VITALS — HEIGHT: 60 IN | BODY MASS INDEX: 38.87 KG/M2 | TEMPERATURE: 97.3 F | WEIGHT: 197.97 LBS

## 2021-03-29 DIAGNOSIS — M62.81 QUADRICEPS WEAKNESS: ICD-10-CM

## 2021-03-29 DIAGNOSIS — S86.912A KNEE STRAIN, LEFT, INITIAL ENCOUNTER: Primary | ICD-10-CM

## 2021-03-29 DIAGNOSIS — Z98.890 S/P ARTHROSCOPIC PARTIAL MEDIAL MENISCECTOMY: Primary | ICD-10-CM

## 2021-03-29 DIAGNOSIS — S89.92XS INJURY OF LEFT KNEE, SEQUELA: ICD-10-CM

## 2021-03-29 DIAGNOSIS — Z98.890 S/P ARTHROSCOPIC PARTIAL MEDIAL MENISCECTOMY: ICD-10-CM

## 2021-03-29 PROCEDURE — G0283 ELEC STIM OTHER THAN WOUND: HCPCS | Performed by: PHYSICAL THERAPIST

## 2021-03-29 PROCEDURE — 73562 X-RAY EXAM OF KNEE 3: CPT

## 2021-03-29 PROCEDURE — 99212 OFFICE O/P EST SF 10 MIN: CPT | Performed by: ORTHOPAEDIC SURGERY

## 2021-03-29 PROCEDURE — 73562 X-RAY EXAM OF KNEE 3: CPT | Performed by: RADIOLOGY

## 2021-03-29 PROCEDURE — 97116 GAIT TRAINING THERAPY: CPT | Performed by: PHYSICAL THERAPIST

## 2021-03-29 PROCEDURE — 20610 DRAIN/INJ JOINT/BURSA W/O US: CPT | Performed by: ORTHOPAEDIC SURGERY

## 2021-03-29 RX ADMIN — METHYLPREDNISOLONE ACETATE 80 MG: 80 INJECTION, SUSPENSION INTRA-ARTICULAR; INTRALESIONAL; INTRAMUSCULAR; SOFT TISSUE at 09:56

## 2021-03-29 RX ADMIN — LIDOCAINE HYDROCHLORIDE 5 ML: 20 INJECTION, SOLUTION INFILTRATION; PERINEURAL at 09:56

## 2021-03-29 NOTE — PROGRESS NOTES
Patient: Julieta Holt   : 1950  Referring practitioner: Bayron Velasquez, *  Date of Initial Visit: Type: THERAPY  Noted: 3/15/2021  Today's Date: 3/29/2021  Patient seen for 5 sessions           Subjective Evaluation    History of Present Illness    Subjective comment: Pt reports she has been cleared by her ortho to return to tx.  Pt has had fluid removed from her knee. Pt reports having 3/10 pre pain.       Objective   See Exercise, Manual, and Modality Logs for complete treatment.       Assessment & Plan     Assessment  Assessment details: Tx today consisted of there ex for improved leg mobility followed by pre gait and GT and ended with ice and estim post tx. Pt demonstrated good effort today with noted improved gait mechanics post tx and decreased pain to 2/10.    Plan  Plan details: Will follow progressing knee stability and decreased pain.        Visit Diagnoses:    ICD-10-CM ICD-9-CM   1. S/P arthroscopic partial medial meniscectomy  Z98.890 V45.89   2. Quadriceps weakness  M62.81 728.87       Progress strengthening /stabilization /functional activity           Timed:  Manual Therapy:         mins  44477;  Therapeutic Exercise:     25    mins  52162; ( no charge due to shared min)    Neuromuscular Linda:        mins  51251;    Therapeutic Activity:          mins  70011;     Gait Trainin     mins  33662;     Ultrasound:          mins  02358;    Electrical Stimulation:         mins  87219 ( );    Untimed:  Electrical Stimulation:    10     mins  89758 ( );  Mechanical Traction:         mins  78095;     Timed Treatment:   12   Mins(Ther ex NC due to shared min)   Total Treatment:     22   mins  Paul Reno, PT  Physical Therapist

## 2021-03-30 PROBLEM — S86.912A KNEE STRAIN, LEFT, INITIAL ENCOUNTER: Status: ACTIVE | Noted: 2021-03-30

## 2021-04-01 ENCOUNTER — TREATMENT (OUTPATIENT)
Dept: PHYSICAL THERAPY | Facility: CLINIC | Age: 71
End: 2021-04-01

## 2021-04-01 DIAGNOSIS — Z98.890 S/P ARTHROSCOPIC PARTIAL MEDIAL MENISCECTOMY: Primary | ICD-10-CM

## 2021-04-01 DIAGNOSIS — M62.81 QUADRICEPS WEAKNESS: ICD-10-CM

## 2021-04-01 PROCEDURE — 97110 THERAPEUTIC EXERCISES: CPT | Performed by: PHYSICAL THERAPIST

## 2021-04-01 PROCEDURE — 97116 GAIT TRAINING THERAPY: CPT | Performed by: PHYSICAL THERAPIST

## 2021-04-01 PROCEDURE — G0283 ELEC STIM OTHER THAN WOUND: HCPCS | Performed by: PHYSICAL THERAPIST

## 2021-04-01 RX ORDER — METHYLPREDNISOLONE ACETATE 80 MG/ML
80 INJECTION, SUSPENSION INTRA-ARTICULAR; INTRALESIONAL; INTRAMUSCULAR; SOFT TISSUE
Status: COMPLETED | OUTPATIENT
Start: 2021-03-29 | End: 2021-03-29

## 2021-04-01 RX ORDER — LIDOCAINE HYDROCHLORIDE 20 MG/ML
5 INJECTION, SOLUTION INFILTRATION; PERINEURAL
Status: COMPLETED | OUTPATIENT
Start: 2021-03-29 | End: 2021-03-29

## 2021-04-01 NOTE — PROGRESS NOTES
Physical Therapy Daily Treatment Note      Patient: Julieta Holt   : 1950  Referring practitioner: Bayron Velasquez, *  Date of Initial Visit: Type: THERAPY  Noted: 3/15/2021  Today's Date: 2021  Patient seen for 6 sessions           Subjective Evaluation    History of Present Illness    Subjective comment: Patient reports that her pain has improved since her fallPain  Current pain ratin           Objective   See Exercise, Manual, and Modality Logs for complete treatment.       Assessment & Plan     Assessment  Assessment details: Patient tolerated today's session well, with reports of slight decrease in pain at conclusion of therapy; patient reported 1/10 pain post-tx.  There ex continued to focus on LE strengthening and knee ROM.  Gait training provided, with patient given verbal and visual cues for improved gait mechanics during ambulation.  Patient showed improved weight shifting following gait training.  Session concluded with ice/ESTIM, with no skin irritation following.  She will continue to be progressed per her tolerance and POC.        Visit Diagnoses:    ICD-10-CM ICD-9-CM   1. S/P arthroscopic partial medial meniscectomy  Z98.890 V45.89   2. Quadriceps weakness  M62.81 728.87       Progress per Plan of Care and Progress strengthening /stabilization /functional activity           Timed:  Manual Therapy:         mins  01811;  Therapeutic Exercise:    31     mins  78096;     Neuromuscular Linda:        mins  62773;    Therapeutic Activity:          mins  57765;     Gait Trainin     mins  61319;     Ultrasound:          mins  00531;    Electrical Stimulation:         mins  26681 ( );    Untimed:  Electrical Stimulation:    10     mins  56837 ( );  Mechanical Traction:         mins  00694;     Timed Treatment:   42   mins   Total Treatment:     52   mins  Elle Mora, PT  Physical Therapist

## 2021-04-05 ENCOUNTER — TREATMENT (OUTPATIENT)
Dept: PHYSICAL THERAPY | Facility: CLINIC | Age: 71
End: 2021-04-05

## 2021-04-05 DIAGNOSIS — M62.81 QUADRICEPS WEAKNESS: ICD-10-CM

## 2021-04-05 DIAGNOSIS — Z98.890 S/P ARTHROSCOPIC PARTIAL MEDIAL MENISCECTOMY: Primary | ICD-10-CM

## 2021-04-05 PROCEDURE — 97110 THERAPEUTIC EXERCISES: CPT | Performed by: PHYSICAL THERAPIST

## 2021-04-05 PROCEDURE — G0283 ELEC STIM OTHER THAN WOUND: HCPCS | Performed by: PHYSICAL THERAPIST

## 2021-04-05 NOTE — PROGRESS NOTES
Physical Therapy Daily Treatment Note      Patient: Julieta Holt   : 1950  Referring practitioner: Bayron Velasquez, *  Date of Initial Visit: Type: THERAPY  Noted: 3/15/2021  Today's Date: 2021  Patient seen for 7 sessions               Subjective Evaluation    History of Present Illness    Subjective comment: Pt reports 0/10 left knee pain prior to today's session. The patient reports she has not had anymore falls since her last treatment session.Pain  Current pain ratin           Objective   See Exercise, Manual, and Modality Logs for complete treatment.       Assessment & Plan     Assessment  Assessment details: The patient arrived to today's session with impaired gait, including decreased left weight shift and decreased left knee flexion with swing phase. Rest breaks were provided as needed during therapeutic exercises secondary to fatigue. The patient reported increased fatigue and left lower extremity weakness with SAQ and seated march. The session concluded with cryotherapy combined with pre-mod to the left lower extremity for pain relief and inflammation. The patient reported 0/10 left knee pain following today's session.    Plan  Plan details: Continue to progress standing activities for improved gait and weight shift.        Visit Diagnoses:    ICD-10-CM ICD-9-CM   1. S/P arthroscopic partial medial meniscectomy  Z98.890 V45.89   2. Quadriceps weakness  M62.81 728.87       Progress per Plan of Care           Timed:  Manual Therapy:         mins  38153;  Therapeutic Exercise:   39   mins  24751;     Neuromuscular Linda:        mins  97170;    Therapeutic Activity:          mins  67101;     Gait Training:           mins  80574;     Ultrasound:          mins  36436;    Electrical Stimulation:         mins  02586 ( );    Untimed:  Electrical Stimulation:   10    mins  55621 ( );  Mechanical Traction:         mins  73970;     Timed Treatment:  39  mins   Total  Treatment:     49   mins    Ashley Claudene Dalton, PT  Physical Therapist

## 2021-04-08 ENCOUNTER — TREATMENT (OUTPATIENT)
Dept: PHYSICAL THERAPY | Facility: CLINIC | Age: 71
End: 2021-04-08

## 2021-04-08 DIAGNOSIS — M62.81 QUADRICEPS WEAKNESS: ICD-10-CM

## 2021-04-08 DIAGNOSIS — Z98.890 S/P ARTHROSCOPIC PARTIAL MEDIAL MENISCECTOMY: Primary | ICD-10-CM

## 2021-04-08 PROCEDURE — 97110 THERAPEUTIC EXERCISES: CPT | Performed by: PHYSICAL THERAPIST

## 2021-04-08 PROCEDURE — G0283 ELEC STIM OTHER THAN WOUND: HCPCS | Performed by: PHYSICAL THERAPIST

## 2021-04-08 NOTE — PROGRESS NOTES
Physical Therapy Daily Treatment Note      Patient: Julieta Holt   : 1950  Referring practitioner: Bayron Velasquez, *  Date of Initial Visit: Type: THERAPY  Noted: 3/15/2021  Today's Date: 2021  Patient seen for 8 sessions           Subjective Evaluation    History of Present Illness    Subjective comment: Patient reports that she has 0/10 pain today.  She notes that she can tell she is improving with therapy.Pain  No pain reported           Objective   See Exercise, Manual, and Modality Logs for complete treatment.       Assessment & Plan     Assessment  Assessment details: Patient tolerated today's session well, with reports of 0/10 pain pre- and post-tx.  There ex progressed to include increased standing exercises.  Patient noted mild fatigue following standing exercises.  Therapy session was concluded with ice/ESTIM, with no skin irritation noted.  Patient was educated on benefits of cryotherapy, with patient verbalizing understanding.  She will continue to be progressed per her tolerance and POC.        Visit Diagnoses:    ICD-10-CM ICD-9-CM   1. S/P arthroscopic partial medial meniscectomy  Z98.890 V45.89   2. Quadriceps weakness  M62.81 728.87       Progress per Plan of Care and Progress strengthening /stabilization /functional activity           Timed:  Manual Therapy:         mins  50203;  Therapeutic Exercise:    40     mins  44866;     Neuromuscular Linda:        mins  82994;    Therapeutic Activity:          mins  86784;     Gait Training:           mins  04707;     Ultrasound:          mins  36510;    Electrical Stimulation:         mins  83131 ( );    Untimed:  Electrical Stimulation:    10     mins  40970 ( );  Mechanical Traction:         mins  45851;     Timed Treatment:   40   mins   Total Treatment:     50   mins  Elle Mora, PT  Physical Therapist

## 2021-04-12 ENCOUNTER — TREATMENT (OUTPATIENT)
Dept: PHYSICAL THERAPY | Facility: CLINIC | Age: 71
End: 2021-04-12

## 2021-04-12 DIAGNOSIS — Z98.890 S/P ARTHROSCOPIC PARTIAL MEDIAL MENISCECTOMY: Primary | ICD-10-CM

## 2021-04-12 DIAGNOSIS — M62.81 QUADRICEPS WEAKNESS: ICD-10-CM

## 2021-04-12 PROCEDURE — 97530 THERAPEUTIC ACTIVITIES: CPT | Performed by: PHYSICAL THERAPIST

## 2021-04-12 PROCEDURE — 97140 MANUAL THERAPY 1/> REGIONS: CPT | Performed by: PHYSICAL THERAPIST

## 2021-04-12 PROCEDURE — 97110 THERAPEUTIC EXERCISES: CPT | Performed by: PHYSICAL THERAPIST

## 2021-04-12 NOTE — PROGRESS NOTES
Physical Therapy Daily Treatment Note      Patient: Julieta Holt   : 1950  Referring practitioner: Bayron Velasquez, *  Date of Initial Visit: Type: THERAPY  Noted: 3/15/2021  Today's Date: 2021  Patient seen for 9 sessions         Subjective:  Patient reports doing well this morning, w/ no complaints of left knee pain prior to tx.  Pt states she had some knee pain yesterday, when stepping up onto one of her steps at home.  Pt notes the pain was brief and didn't last long.  Otherwise the pt reports of continued progress.     Objective          Active Range of Motion   Left Knee   Flexion: 130 degrees       See Exercise, Manual, and Modality Logs for complete treatment.       Assessment/Plan:  Patient responded well to today's session w/ no reports of knee pain noted at conclusion.  Treatment initiated w/ therex as listed f/b manual rx and conclusion of cryotherapy.  Therex progressed w/ additional LE strengthening activities added to program.  Pt observed w/ good tolerance, and was provided w/ cues and demonstration w/ new added exercise.  Manual STM performed to L) knee to address tightness and assist w/ improved mobility.  Pt continues to benefit from therapy services and will be progressed as tolerated.  Continue w/ PT's POC.     Visit Diagnoses:    ICD-10-CM ICD-9-CM   1. S/P arthroscopic partial medial meniscectomy  Z98.890 V45.89   2. Quadriceps weakness  M62.81 728.87       Progress per Plan of Care and Progress strengthening /stabilization /functional activity           Timed:  Manual Therapy:    10    mins  22311;  Therapeutic Exercise:     34    mins  33003;     Neuromuscular Linda:        mins  99299;    Therapeutic Activity:     10     mins  47773;     Gait Training:           mins  20798;     Ultrasound:          mins  01451;    Electrical Stimulation:         mins  22788 ( );    Untimed:  Electrical Stimulation:         mins  95041 ( );  Mechanical  Traction:         mins  90447;     Timed Treatment:  54    mins   Total Treatment:   60     mins  Sophie Lazaro. HERI Rosa  Physical Therapist

## 2021-04-15 ENCOUNTER — TREATMENT (OUTPATIENT)
Dept: PHYSICAL THERAPY | Facility: CLINIC | Age: 71
End: 2021-04-15

## 2021-04-15 DIAGNOSIS — M62.81 QUADRICEPS WEAKNESS: ICD-10-CM

## 2021-04-15 DIAGNOSIS — Z98.890 S/P ARTHROSCOPIC PARTIAL MEDIAL MENISCECTOMY: Primary | ICD-10-CM

## 2021-04-15 PROCEDURE — 97140 MANUAL THERAPY 1/> REGIONS: CPT | Performed by: PHYSICAL THERAPIST

## 2021-04-15 PROCEDURE — 97530 THERAPEUTIC ACTIVITIES: CPT | Performed by: PHYSICAL THERAPIST

## 2021-04-15 PROCEDURE — 97110 THERAPEUTIC EXERCISES: CPT | Performed by: PHYSICAL THERAPIST

## 2021-04-15 NOTE — PROGRESS NOTES
Treatment/Discharge      Patient: Julieta Holt   : 1950  Diagnosis/ICD-10 Code:  S/P arthroscopic partial medial meniscectomy [Z98.890]  Referring practitioner: Bayron Velasquez, *  Date of Initial Visit: Type: THERAPY  Noted: 3/15/2021  Today's Date: 4/15/2021  Patient seen for 10 sessions      Subjective:   Subjective Questionnaire: LEFS: 65/80=18.75% impaired  Clinical Progress: improved  Home Program Compliance: Yes    Subjective Evaluation    History of Present Illness    Subjective comment: Patient reports that her worst pain has been 1/10.  She reports that her knee is doing really well, but notes that it does bother her some with rainy weather due to arthritis.  She notes that she is able to perform her daily tasks with no modifications.Pain  Current pain ratin  At best pain ratin  At worst pain ratin         Objective          Palpation   Left   Tenderness of the distal biceps femoris, distal semimembranosus, distal semitendinosus, lateral gastrocnemius and medial gastrocnemius.     Active Range of Motion   Left Knee   Flexion: 132 degrees   Extensor la degrees     Patellar Mobility   Left Knee Patellar tendons within functional limits include the medial, lateral, superior and inferior.     Strength/Myotome Testing     Left Hip   Planes of Motion   Flexion: 4+  Abduction: 4+  Adduction: 4+    Right Hip   Planes of Motion   Flexion: 4+  Abduction: 4+  Adduction: 4+    Left Knee   Flexion: 4+  Extension: 4    Right Knee   Flexion: 4+  Extension: 4+    Swelling     Left Knee Girth Measurement (cm)   Joint line: 45 cm    Right Knee Girth Measurement (cm)   Joint line: 45 cm      Assessment & Plan     Assessment  Assessment details: Patient has been attending therapy for rehabilitation s/p left partial medial menisectomy; she has attended therapy for a total of 10 sessions, dating from 3/15/2021 to 4/15/2021.  Patient has shown improvements in left knee ROM, LE  strengthening, edema, and pain.  Patient has met 6/6 STGs and 4/5 LTGs; 1/5 LTGs is partially met.  Patient will be discharged, secondary to reaching maximum level of function at this time.  Patient is encouraged to continue with HEP following discharge from therapy.  Prognosis: good    Goals  Plan Goals: SHORT TERM GOALS:  2 weeks       1. Pt will be instructed in HEP for improved independence.  Met  2. Pt to demonstrate 4/5 left knee strength to improve stability with ambulation  Met  3.  Pt will report at least 40/80 on LEFS for improved independence.  Met  4. Pt to report being able to walk for 10 minutes without increasing pain in the left knee for improved community involvement.  Met  5. Pt will demonstrate full left knee extension for improved gait.  Met  6. Pt will report the ability to stand 20 minutes for improved ability to cook meals.  Met    LONG TERM GOALS:   6 weeks  1. Pt will report at least 60/80 on LEFS for improved independence.  Met-65/80  2. Pt to demonstrate left knee strength to 4+/5 or greater to improve safety with ambulation on uneven surfaces.  Partially met  3. Pt to report being able to walk for 30 minutes without increasing pain in the left knee for improved community involvement.  Met  4. Pt will report no more than 2/10 left knee pain for improved function.  Met  5. Pt will demonstrate 0-130 left knee ROM for improved gait.  Met      Plan  Therapy options: will not be seen for skilled physical therapy services  Treatment plan discussed with: patient  Plan details: Patient to be discharged at conclusion of today's session.        Visit Diagnoses:    ICD-10-CM ICD-9-CM   1. S/P arthroscopic partial medial meniscectomy  Z98.890 V45.89   2. Quadriceps weakness  M62.81 728.87       Progress toward previous goals: 6/6 STGs met, 4/5 LTGs met, 1/5 LTGs partially met      Recommendations: Discharge      PT Signature: Elle Mora, PT      Based upon review of the patient's progress and  continued therapy plan, it is my medical opinion that Julieta Holt should continue physical therapy treatment at [facility]  [address]  [address]  [place] [address] [phone].    Signature: __________________________________  Bayron Velasquez MD    Timed:  Manual Therapy:    10     mins  24340;  Therapeutic Exercise:    35     mins  67411;     Neuromuscular Linda:        mins  78055;    Therapeutic Activity:     10     mins  56677;     Gait Training:           mins  15813;     Ultrasound:          mins  58182;    Electrical Stimulation:         mins  19115 ( );    Untimed:  Electrical Stimulation:         mins  04632 ( );  Mechanical Traction:         mins  40822;     Timed Treatment:   55   mins   Total Treatment:     55   mins

## 2021-04-21 ENCOUNTER — HOSPITAL ENCOUNTER (OUTPATIENT)
Dept: GENERAL RADIOLOGY | Facility: HOSPITAL | Age: 71
Discharge: HOME | End: 2021-04-21
Admitting: ORTHOPAEDIC SURGERY

## 2021-04-21 ENCOUNTER — OFFICE VISIT (OUTPATIENT)
Dept: ORTHOPEDIC SURGERY | Facility: CLINIC | Age: 71
End: 2021-04-21

## 2021-04-21 VITALS — HEIGHT: 60 IN | TEMPERATURE: 97.1 F | WEIGHT: 197 LBS | BODY MASS INDEX: 38.68 KG/M2

## 2021-04-21 DIAGNOSIS — M25.551 HIP PAIN, BILATERAL: ICD-10-CM

## 2021-04-21 DIAGNOSIS — M25.552 HIP PAIN, BILATERAL: ICD-10-CM

## 2021-04-21 DIAGNOSIS — Z98.890 S/P ARTHROSCOPIC PARTIAL MEDIAL MENISCECTOMY: ICD-10-CM

## 2021-04-21 DIAGNOSIS — M70.62 TROCHANTERIC BURSITIS OF LEFT HIP: Primary | ICD-10-CM

## 2021-04-21 PROCEDURE — 99213 OFFICE O/P EST LOW 20 MIN: CPT | Performed by: ORTHOPAEDIC SURGERY

## 2021-04-21 PROCEDURE — 73502 X-RAY EXAM HIP UNI 2-3 VIEWS: CPT | Performed by: RADIOLOGY

## 2021-04-21 PROCEDURE — 20610 DRAIN/INJ JOINT/BURSA W/O US: CPT | Performed by: ORTHOPAEDIC SURGERY

## 2021-04-21 PROCEDURE — 99024 POSTOP FOLLOW-UP VISIT: CPT | Performed by: ORTHOPAEDIC SURGERY

## 2021-04-21 PROCEDURE — 73502 X-RAY EXAM HIP UNI 2-3 VIEWS: CPT

## 2021-04-21 RX ADMIN — METHYLPREDNISOLONE ACETATE 80 MG: 80 INJECTION, SUSPENSION INTRA-ARTICULAR; INTRALESIONAL; INTRAMUSCULAR; SOFT TISSUE at 09:11

## 2021-04-21 RX ADMIN — LIDOCAINE HYDROCHLORIDE 5 ML: 20 INJECTION, SOLUTION INFILTRATION; PERINEURAL at 09:11
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
## 2021-04-23 RX ORDER — LIDOCAINE HYDROCHLORIDE 20 MG/ML
5 INJECTION, SOLUTION INFILTRATION; PERINEURAL
Status: COMPLETED | OUTPATIENT
Start: 2021-04-21 | End: 2021-04-21

## 2021-04-23 RX ORDER — METHYLPREDNISOLONE ACETATE 80 MG/ML
80 INJECTION, SUSPENSION INTRA-ARTICULAR; INTRALESIONAL; INTRAMUSCULAR; SOFT TISSUE
Status: COMPLETED | OUTPATIENT
Start: 2021-04-21 | End: 2021-04-21

## 2021-04-26 ENCOUNTER — TRANSCRIBE ORDERS (OUTPATIENT)
Dept: ADMINISTRATIVE | Facility: HOSPITAL | Age: 71
End: 2021-04-26

## 2021-04-27 ENCOUNTER — TRANSCRIBE ORDERS (OUTPATIENT)
Dept: ADMINISTRATIVE | Facility: HOSPITAL | Age: 71
End: 2021-04-27

## 2021-04-27 DIAGNOSIS — Z87.891 PERSONAL HISTORY OF NICOTINE DEPENDENCE: Primary | ICD-10-CM

## 2021-06-28 ENCOUNTER — OFFICE VISIT (OUTPATIENT)
Dept: CARDIOLOGY | Facility: CLINIC | Age: 71
End: 2021-06-28

## 2021-06-28 VITALS
HEIGHT: 60 IN | TEMPERATURE: 97.3 F | BODY MASS INDEX: 38.48 KG/M2 | SYSTOLIC BLOOD PRESSURE: 165 MMHG | HEART RATE: 81 BPM | DIASTOLIC BLOOD PRESSURE: 78 MMHG | WEIGHT: 196 LBS

## 2021-06-28 DIAGNOSIS — I50.20 HEART FAILURE WITH REDUCED EJECTION FRACTION (HCC): Primary | ICD-10-CM

## 2021-06-28 DIAGNOSIS — I50.22 CHRONIC SYSTOLIC CONGESTIVE HEART FAILURE (HCC): ICD-10-CM

## 2021-06-28 PROCEDURE — 99214 OFFICE O/P EST MOD 30 MIN: CPT | Performed by: PHYSICIAN ASSISTANT

## 2021-06-28 PROCEDURE — 93000 ELECTROCARDIOGRAM COMPLETE: CPT | Performed by: PHYSICIAN ASSISTANT

## 2021-06-28 RX ORDER — METOPROLOL SUCCINATE 100 MG/1
100 TABLET, EXTENDED RELEASE ORAL DAILY
Qty: 90 TABLET | Refills: 2 | Status: SHIPPED | OUTPATIENT
Start: 2021-06-28 | End: 2021-09-17 | Stop reason: HOSPADM

## 2021-06-28 RX ORDER — FUROSEMIDE 20 MG/1
20 TABLET ORAL DAILY PRN
Qty: 90 TABLET | Refills: 1 | Status: SHIPPED | OUTPATIENT
Start: 2021-06-28 | End: 2021-09-17 | Stop reason: HOSPADM

## 2021-06-28 RX ORDER — LISINOPRIL 5 MG/1
5 TABLET ORAL DAILY
Qty: 90 TABLET | Refills: 3 | Status: SHIPPED | OUTPATIENT
Start: 2021-06-28 | End: 2022-02-10

## 2021-06-28 RX ORDER — ISOSORBIDE MONONITRATE 120 MG/1
120 TABLET, EXTENDED RELEASE ORAL EVERY MORNING
Qty: 90 TABLET | Refills: 2 | Status: SHIPPED | OUTPATIENT
Start: 2021-06-28 | End: 2021-09-17 | Stop reason: HOSPADM

## 2021-06-28 RX ORDER — ATORVASTATIN CALCIUM 10 MG/1
10 TABLET, FILM COATED ORAL DAILY
Qty: 90 TABLET | Refills: 2 | Status: SHIPPED | OUTPATIENT
Start: 2021-06-28 | End: 2022-05-18 | Stop reason: SDUPTHER

## 2021-06-28 RX ORDER — NITROGLYCERIN 0.4 MG/1
0.4 TABLET SUBLINGUAL
Qty: 25 TABLET | Refills: 0 | Status: SHIPPED | OUTPATIENT
Start: 2021-06-28

## 2021-06-28 NOTE — PROGRESS NOTES
Joan Vasquez, APRN  Julieta Holt  1950  06/28/2021    Patient Active Problem List   Diagnosis   • Palpitations   • Precordial pain   • Dyslipidemia   • SOB (shortness of breath)   • Trochanteric bursitis of left hip   • Primary osteoarthritis of left knee   • Acute medial meniscus tear of left knee   • S/P arthroscopic partial medial meniscectomy   • Knee strain, left, initial encounter   • Hip pain, bilateral       Dear Joan Vasquez, APRN:    Subjective     History of Present Illness:    Chief Complaint   Patient presents with   • Follow-up     routine    • Med Management     verbal       Julieta Holt is a pleasant 70 y.o. female with a past medical history significant for no known CAD but is prediabetic and has history of tobacco abuse with 30-pack-year history but did quit in 2004.  She also has history of essential hypertension and dyslipidemia.  She also reports family history of coronary artery disease.  She comes in today for routine cardiology follow-up.    Ms. Holt reports overall she has been undergoing more stress recently due to repairs on her home and also reports that she has been more short of breath over the last month that started in the beginning of the month.  She reports that the shortness of breath is particularly worse when she exerts herself such as walking up an incline.  She denies worsening orthopnea, PND, and pedal edema.  She also denies any chest pains, syncope, or near syncope.    Allergies   Allergen Reactions   • Erythromycin        bruise   :      Current Outpatient Medications:   •  alendronate (FOSAMAX) 70 MG tablet, Take 70 mg by mouth Every 7 (Seven) Days., Disp: , Rfl:   •  aspirin 81 MG tablet, Take 1 tablet by mouth Daily., Disp: 30 tablet, Rfl: 11  •  atorvastatin (LIPITOR) 10 MG tablet, Take 1 tablet by mouth Daily., Disp: 90 tablet, Rfl: 2  •  busPIRone (BUSPAR) 10 MG tablet, Take 10 mg by mouth 2 (Two) Times a Day., Disp: ,  Rfl:   •  Calcium Carbonate-Vit D-Min (Calcium 600+D3 Plus Minerals) 600-800 MG-UNIT tablet, Take  by mouth Daily., Disp: , Rfl:   •  cholecalciferol (VITAMIN D3) 1.25 MG (18038 UT) capsule, Take 50,000 Units by mouth 1 (One) Time Per Week., Disp: , Rfl:   •  diclofenac (VOLTAREN) 75 MG EC tablet, 75 mg 2 (Two) Times a Day., Disp: , Rfl:   •  DULoxetine (CYMBALTA) 30 MG capsule, 30 mg 2 (Two) Times a Day., Disp: , Rfl:   •  famotidine (PEPCID) 40 MG tablet, Take 40 mg by mouth Daily., Disp: , Rfl:   •  fluticasone (FLONASE) 50 MCG/ACT nasal spray, , Disp: , Rfl:   •  furosemide (LASIX) 20 MG tablet, Take 1 tablet by mouth Daily As Needed (increased shortness of breath, pedal edema, or weight gain >4 lbs in a day)., Disp: 90 tablet, Rfl: 1  •  isosorbide mononitrate (IMDUR) 120 MG 24 hr tablet, Take 1 tablet by mouth Every Morning., Disp: 90 tablet, Rfl: 2  •  lisinopril (PRINIVIL,ZESTRIL) 5 MG tablet, Take 1 tablet by mouth Daily., Disp: 90 tablet, Rfl: 3  •  metoprolol succinate XL (TOPROL-XL) 100 MG 24 hr tablet, Take 1 tablet by mouth Daily., Disp: 90 tablet, Rfl: 2  •  nitroglycerin (NITROSTAT) 0.4 MG SL tablet, Place 1 tablet under the tongue Every 5 (Five) Minutes As Needed for Chest Pain. Take no more than 3 doses in 15 minutes., Disp: 25 tablet, Rfl: 0  •  HYDROcodone-acetaminophen (NORCO) 7.5-325 MG per tablet, Take 1 to 2 tablets by mouth Every 4 (Four) Hours As Needed for Moderate Pain  (Pain)., Disp: 12 tablet, Rfl: 0    The following portions of the patient's history were reviewed and updated as appropriate: allergies, current medications, past family history, past medical history, past social history, past surgical history and problem list.    Social History     Tobacco Use   • Smoking status: Former Smoker     Packs/day: 1.50     Types: Cigarettes     Quit date:      Years since quittin.5   • Smokeless tobacco: Never Used   • Tobacco comment: quit 2004   Vaping Use   • Vaping Use: Never used  "  Substance Use Topics   • Alcohol use: Yes     Comment: ocassionally   • Drug use: No         Objective   Vitals:    06/28/21 0826   BP: 165/78   Pulse: 81   Temp: 97.3 °F (36.3 °C)   Weight: 88.9 kg (196 lb)   Height: 152.4 cm (60\")     Body mass index is 38.28 kg/m².    Constitutional:       General: Not in acute distress.     Appearance: Healthy appearance. Well-developed and not in distress. Not diaphoretic.   Eyes:      Conjunctiva/sclera: Conjunctivae normal.      Pupils: Pupils are equal, round, and reactive to light.   HENT:      Head: Normocephalic and atraumatic.   Neck:      Vascular: No carotid bruit or JVD.   Pulmonary:      Effort: Pulmonary effort is normal. No respiratory distress.      Breath sounds: Normal breath sounds.   Cardiovascular:      Normal rate. Regular rhythm.   Skin:     General: Skin is cool.   Neurological:      Mental Status: Alert, oriented to person, place, and time and oriented to person, place and time.         Lab Results   Component Value Date     03/02/2021    K 4.0 03/02/2021     03/02/2021    CO2 22.7 03/02/2021    BUN 22 03/02/2021    CREATININE 0.87 03/02/2021    GLUCOSE 110 (H) 03/02/2021    CALCIUM 10.1 03/02/2021    AST 17 12/01/2017    ALT 21 12/01/2017    ALKPHOS 111 (H) 12/01/2017     No results found for: CKTOTAL  Lab Results   Component Value Date    WBC 10.17 03/02/2021    HGB 14.9 03/02/2021    HCT 48.9 (H) 03/02/2021     03/02/2021     Lab Results   Component Value Date    INR 0.96 11/17/2017     No results found for: MG  Lab Results   Component Value Date    TRIG 76 12/01/2017    HDL 61 12/01/2017    LDL 92 12/01/2017      No results found for: BNP    During this visit the following were done:  Labs Reviewed [x]    Labs Ordered []    Radiology Reports Reviewed []    Radiology Ordered []    PCP Records Reviewed []    Referring Provider Records Reviewed []    ER Records Reviewed []    Hospital Records Reviewed []    History Obtained From " Family []    Radiology Images Reviewed []    Other Reviewed []    Records Requested []         ECG 12 Lead    Date/Time: 6/28/2021 8:26 AM  Performed by: Maxime Scott PA-C  Authorized by: Maxime Scott PA-C   Comparison: compared with previous ECG   Similar to previous ECG  Rhythm: sinus rhythm  Conduction: left bundle branch block    Clinical impression: abnormal EKG            Assessment/Plan    Diagnosis Plan   1. Heart failure with reduced ejection fraction (CMS/HCC)  Basic Metabolic Panel    BNP   2. Chronic systolic congestive heart failure (CMS/HCC)  Adult Transthoracic Echo Complete W/ Cont if Necessary Per Protocol            Recommendations:  1. Heart failure with reduced ejection fraction  1. Last echocardiogram was over a year ago patient with new onset dyspnea I will reorder echocardiogram.  2. Will check BMP and BNP  3. Advised her that she can increase her Lasix to 40 mg daily for the next 3 to 4 days.  4. Continue aspirin, Lipitor, metoprolol succinate, and lisinopril.      Return in about 3 months (around 9/28/2021).    As always, I appreciate very much the opportunity to participate in the cardiovascular care of your patients.      With Best Regards,    Maxime Scott PA-C

## 2021-07-09 ENCOUNTER — LAB (OUTPATIENT)
Dept: LAB | Facility: HOSPITAL | Age: 71
End: 2021-07-09

## 2021-07-09 ENCOUNTER — HOSPITAL ENCOUNTER (OUTPATIENT)
Dept: GENERAL RADIOLOGY | Facility: HOSPITAL | Age: 71
Discharge: HOME | End: 2021-07-09

## 2021-07-09 ENCOUNTER — TRANSCRIBE ORDERS (OUTPATIENT)
Dept: ADMINISTRATIVE | Facility: HOSPITAL | Age: 71
End: 2021-07-09

## 2021-07-09 ENCOUNTER — HOSPITAL ENCOUNTER (OUTPATIENT)
Dept: CARDIOLOGY | Facility: HOSPITAL | Age: 71
Discharge: HOME | End: 2021-07-09

## 2021-07-09 DIAGNOSIS — R06.02 SOB (SHORTNESS OF BREATH): ICD-10-CM

## 2021-07-09 DIAGNOSIS — I50.20 HEART FAILURE WITH REDUCED EJECTION FRACTION (HCC): ICD-10-CM

## 2021-07-09 DIAGNOSIS — R05.9 COUGH: ICD-10-CM

## 2021-07-09 DIAGNOSIS — R06.02 SOB (SHORTNESS OF BREATH): Primary | ICD-10-CM

## 2021-07-09 DIAGNOSIS — I50.22 CHRONIC SYSTOLIC CONGESTIVE HEART FAILURE (HCC): ICD-10-CM

## 2021-07-09 LAB
ANION GAP SERPL CALCULATED.3IONS-SCNC: 11 MMOL/L (ref 5–15)
BUN SERPL-MCNC: 14 MG/DL (ref 8–23)
BUN/CREAT SERPL: 17.3 (ref 7–25)
CALCIUM SPEC-SCNC: 9.5 MG/DL (ref 8.6–10.5)
CHLORIDE SERPL-SCNC: 103 MMOL/L (ref 98–107)
CO2 SERPL-SCNC: 27 MMOL/L (ref 22–29)
CREAT SERPL-MCNC: 0.81 MG/DL (ref 0.57–1)
GFR SERPL CREATININE-BSD FRML MDRD: 70 ML/MIN/1.73
GLUCOSE SERPL-MCNC: 93 MG/DL (ref 65–99)
NT-PROBNP SERPL-MCNC: 754.6 PG/ML (ref 0–900)
POTASSIUM SERPL-SCNC: 4.2 MMOL/L (ref 3.5–5.2)
SODIUM SERPL-SCNC: 141 MMOL/L (ref 136–145)

## 2021-07-09 PROCEDURE — 80048 BASIC METABOLIC PNL TOTAL CA: CPT

## 2021-07-09 PROCEDURE — 71046 X-RAY EXAM CHEST 2 VIEWS: CPT

## 2021-07-09 PROCEDURE — 83880 ASSAY OF NATRIURETIC PEPTIDE: CPT

## 2021-07-09 PROCEDURE — 93306 TTE W/DOPPLER COMPLETE: CPT | Performed by: INTERNAL MEDICINE

## 2021-07-09 PROCEDURE — 71046 X-RAY EXAM CHEST 2 VIEWS: CPT | Performed by: RADIOLOGY

## 2021-07-09 PROCEDURE — 93306 TTE W/DOPPLER COMPLETE: CPT

## 2021-07-09 PROCEDURE — 36415 COLL VENOUS BLD VENIPUNCTURE: CPT

## 2021-07-11 LAB
BH CV ECHO MEAS - % IVS THICK: -2.7 %
BH CV ECHO MEAS - % LVPW THICK: 51.1 %
BH CV ECHO MEAS - ACS: 1.6 CM
BH CV ECHO MEAS - AO MAX PG: 8.8 MMHG
BH CV ECHO MEAS - AO MEAN PG: 4 MMHG
BH CV ECHO MEAS - AO ROOT AREA (BSA CORRECTED): 1.4
BH CV ECHO MEAS - AO ROOT AREA: 4.9 CM^2
BH CV ECHO MEAS - AO ROOT DIAM: 2.5 CM
BH CV ECHO MEAS - AO V2 MAX: 148 CM/SEC
BH CV ECHO MEAS - AO V2 MEAN: 91.8 CM/SEC
BH CV ECHO MEAS - AO V2 VTI: 23.1 CM
BH CV ECHO MEAS - BSA(HAYCOCK): 2 M^2
BH CV ECHO MEAS - BSA: 1.9 M^2
BH CV ECHO MEAS - BZI_BMI: 38.3 KILOGRAMS/M^2
BH CV ECHO MEAS - BZI_METRIC_HEIGHT: 152.4 CM
BH CV ECHO MEAS - BZI_METRIC_WEIGHT: 88.9 KG
BH CV ECHO MEAS - EDV(CUBED): 64 ML
BH CV ECHO MEAS - EDV(MOD-SP4): 39.1 ML
BH CV ECHO MEAS - EDV(TEICH): 70 ML
BH CV ECHO MEAS - EF(CUBED): 50.5 %
BH CV ECHO MEAS - EF(MOD-SP4): 55 %
BH CV ECHO MEAS - EF(TEICH): 43 %
BH CV ECHO MEAS - ESV(CUBED): 31.7 ML
BH CV ECHO MEAS - ESV(MOD-SP4): 17.6 ML
BH CV ECHO MEAS - ESV(TEICH): 39.9 ML
BH CV ECHO MEAS - FS: 20.9 %
BH CV ECHO MEAS - IVS/LVPW: 1.1
BH CV ECHO MEAS - IVSD: 1.3 CM
BH CV ECHO MEAS - IVSS: 1.3 CM
BH CV ECHO MEAS - LA DIMENSION: 2.8 CM
BH CV ECHO MEAS - LA/AO: 1.1
BH CV ECHO MEAS - LV DIASTOLIC VOL/BSA (35-75): 21.1 ML/M^2
BH CV ECHO MEAS - LV MASS(C)D: 170.6 GRAMS
BH CV ECHO MEAS - LV MASS(C)DI: 92.2 GRAMS/M^2
BH CV ECHO MEAS - LV MASS(C)S: 169.2 GRAMS
BH CV ECHO MEAS - LV MASS(C)SI: 91.4 GRAMS/M^2
BH CV ECHO MEAS - LV SYSTOLIC VOL/BSA (12-30): 9.5 ML/M^2
BH CV ECHO MEAS - LVIDD: 4 CM
BH CV ECHO MEAS - LVIDS: 3.2 CM
BH CV ECHO MEAS - LVLD AP4: 6.5 CM
BH CV ECHO MEAS - LVLS AP4: 5.6 CM
BH CV ECHO MEAS - LVOT AREA (M): 2.3 CM^2
BH CV ECHO MEAS - LVOT AREA: 2.3 CM^2
BH CV ECHO MEAS - LVOT DIAM: 1.7 CM
BH CV ECHO MEAS - LVPWD: 1.1 CM
BH CV ECHO MEAS - LVPWS: 1.7 CM
BH CV ECHO MEAS - MV A MAX VEL: 121 CM/SEC
BH CV ECHO MEAS - MV E MAX VEL: 80.5 CM/SEC
BH CV ECHO MEAS - MV E/A: 0.67
BH CV ECHO MEAS - PA ACC TIME: 0.11 SEC
BH CV ECHO MEAS - PA PR(ACCEL): 31.3 MMHG
BH CV ECHO MEAS - RAP SYSTOLE: 10 MMHG
BH CV ECHO MEAS - RVSP: 24.6 MMHG
BH CV ECHO MEAS - SI(AO): 61.3 ML/M^2
BH CV ECHO MEAS - SI(CUBED): 17.5 ML/M^2
BH CV ECHO MEAS - SI(MOD-SP4): 11.6 ML/M^2
BH CV ECHO MEAS - SI(TEICH): 16.3 ML/M^2
BH CV ECHO MEAS - SV(AO): 113.4 ML
BH CV ECHO MEAS - SV(CUBED): 32.3 ML
BH CV ECHO MEAS - SV(MOD-SP4): 21.5 ML
BH CV ECHO MEAS - SV(TEICH): 30.1 ML
BH CV ECHO MEAS - TR MAX VEL: 191 CM/SEC
MAXIMAL PREDICTED HEART RATE: 149 BPM
STRESS TARGET HR: 127 BPM

## 2021-07-14 ENCOUNTER — TELEPHONE (OUTPATIENT)
Dept: ORTHOPEDIC SURGERY | Facility: CLINIC | Age: 71
End: 2021-07-14

## 2021-07-14 NOTE — TELEPHONE ENCOUNTER
Caller: ISHMAEL LOZA    Relationship to patient: SELF    Best call back number:     Chief complaint: LEFT PAIN     Type of visit: FOLLOW UP     Requested date: ASAP    Additional notes:PATIENT SAID NEEDS FLUID DRAINED.  FIRST APPT SHOWED 8/16 SHE WAS ASKING TO BE SEEN SOONER SAID CAN BARELY MOVE AT WORK

## 2021-07-21 ENCOUNTER — TRANSCRIBE ORDERS (OUTPATIENT)
Dept: ADMINISTRATIVE | Facility: HOSPITAL | Age: 71
End: 2021-07-21

## 2021-07-21 DIAGNOSIS — M25.562 LEFT KNEE PAIN, UNSPECIFIED CHRONICITY: Primary | ICD-10-CM

## 2021-08-04 ENCOUNTER — HOSPITAL ENCOUNTER (OUTPATIENT)
Dept: MRI IMAGING | Facility: HOSPITAL | Age: 71
Discharge: HOME | End: 2021-08-04
Admitting: PHYSICIAN ASSISTANT

## 2021-08-04 DIAGNOSIS — M25.562 LEFT KNEE PAIN, UNSPECIFIED CHRONICITY: ICD-10-CM

## 2021-08-04 PROCEDURE — 73721 MRI JNT OF LWR EXTRE W/O DYE: CPT

## 2021-08-04 PROCEDURE — 73721 MRI JNT OF LWR EXTRE W/O DYE: CPT | Performed by: RADIOLOGY

## 2021-08-05 ENCOUNTER — APPOINTMENT (OUTPATIENT)
Dept: MRI IMAGING | Facility: HOSPITAL | Age: 71
End: 2021-08-05

## 2021-08-18 ENCOUNTER — OFFICE VISIT (OUTPATIENT)
Dept: ORTHOPEDIC SURGERY | Facility: CLINIC | Age: 71
End: 2021-08-18

## 2021-08-18 VITALS — BODY MASS INDEX: 38.48 KG/M2 | HEIGHT: 60 IN | TEMPERATURE: 98.6 F | WEIGHT: 196 LBS

## 2021-08-18 DIAGNOSIS — M25.562 LEFT KNEE PAIN, UNSPECIFIED CHRONICITY: Primary | ICD-10-CM

## 2021-08-18 DIAGNOSIS — M17.12 PRIMARY OSTEOARTHRITIS OF LEFT KNEE: ICD-10-CM

## 2021-08-18 PROCEDURE — 20610 DRAIN/INJ JOINT/BURSA W/O US: CPT | Performed by: ORTHOPAEDIC SURGERY

## 2021-08-18 RX ORDER — BUPROPION HYDROCHLORIDE 75 MG/1
75 TABLET ORAL
COMMUNITY
Start: 2021-06-28 | End: 2021-09-17 | Stop reason: HOSPADM

## 2021-08-18 RX ADMIN — METHYLPREDNISOLONE ACETATE 80 MG: 80 INJECTION, SUSPENSION INTRA-ARTICULAR; INTRALESIONAL; INTRAMUSCULAR; SOFT TISSUE at 15:15

## 2021-08-18 RX ADMIN — LIDOCAINE HYDROCHLORIDE 5 ML: 10 INJECTION, SOLUTION EPIDURAL; INFILTRATION; INTRACAUDAL; PERINEURAL at 15:15
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
## 2021-08-23 RX ORDER — LIDOCAINE HYDROCHLORIDE 10 MG/ML
5 INJECTION, SOLUTION EPIDURAL; INFILTRATION; INTRACAUDAL; PERINEURAL
Status: COMPLETED | OUTPATIENT
Start: 2021-08-18 | End: 2021-08-18

## 2021-08-23 RX ORDER — METHYLPREDNISOLONE ACETATE 80 MG/ML
80 INJECTION, SUSPENSION INTRA-ARTICULAR; INTRALESIONAL; INTRAMUSCULAR; SOFT TISSUE
Status: COMPLETED | OUTPATIENT
Start: 2021-08-18 | End: 2021-08-18

## 2021-09-15 ENCOUNTER — HOSPITAL ENCOUNTER (OUTPATIENT)
Facility: HOSPITAL | Age: 71
Setting detail: OBSERVATION
Discharge: HOME | End: 2021-09-17
Attending: STUDENT IN AN ORGANIZED HEALTH CARE EDUCATION/TRAINING PROGRAM | Admitting: INTERNAL MEDICINE

## 2021-09-15 ENCOUNTER — APPOINTMENT (OUTPATIENT)
Dept: GENERAL RADIOLOGY | Facility: HOSPITAL | Age: 71
End: 2021-09-15

## 2021-09-15 ENCOUNTER — APPOINTMENT (OUTPATIENT)
Dept: CT IMAGING | Facility: HOSPITAL | Age: 71
End: 2021-09-15

## 2021-09-15 DIAGNOSIS — Q24.9 CONGENITAL HEART DEFECT: ICD-10-CM

## 2021-09-15 DIAGNOSIS — R55 SYNCOPE, UNSPECIFIED SYNCOPE TYPE: Primary | ICD-10-CM

## 2021-09-15 LAB
ALBUMIN SERPL-MCNC: 4.33 G/DL (ref 3.5–5.2)
ALBUMIN/GLOB SERPL: 2 G/DL
ALP SERPL-CCNC: 118 U/L (ref 39–117)
ALT SERPL W P-5'-P-CCNC: 20 U/L (ref 1–33)
ANION GAP SERPL CALCULATED.3IONS-SCNC: 11.6 MMOL/L (ref 5–15)
AST SERPL-CCNC: 19 U/L (ref 1–32)
BACTERIA UR QL AUTO: ABNORMAL /HPF
BASOPHILS # BLD AUTO: 0.06 10*3/MM3 (ref 0–0.2)
BASOPHILS NFR BLD AUTO: 0.4 % (ref 0–1.5)
BILIRUB SERPL-MCNC: 0.3 MG/DL (ref 0–1.2)
BILIRUB UR QL STRIP: NEGATIVE
BUN SERPL-MCNC: 20 MG/DL (ref 8–23)
BUN/CREAT SERPL: 20.4 (ref 7–25)
CALCIUM SPEC-SCNC: 9.6 MG/DL (ref 8.6–10.5)
CHLORIDE SERPL-SCNC: 105 MMOL/L (ref 98–107)
CLARITY UR: CLEAR
CO2 SERPL-SCNC: 24.4 MMOL/L (ref 22–29)
COLOR UR: YELLOW
CREAT SERPL-MCNC: 0.98 MG/DL (ref 0.57–1)
DEPRECATED RDW RBC AUTO: 45.8 FL (ref 37–54)
EOSINOPHIL # BLD AUTO: 0.2 10*3/MM3 (ref 0–0.4)
EOSINOPHIL NFR BLD AUTO: 1.4 % (ref 0.3–6.2)
ERYTHROCYTE [DISTWIDTH] IN BLOOD BY AUTOMATED COUNT: 13.6 % (ref 12.3–15.4)
FLUAV RNA RESP QL NAA+PROBE: NOT DETECTED
FLUBV RNA RESP QL NAA+PROBE: NOT DETECTED
GFR SERPL CREATININE-BSD FRML MDRD: 56 ML/MIN/1.73
GLOBULIN UR ELPH-MCNC: 2.2 GM/DL
GLUCOSE BLDC GLUCOMTR-MCNC: 105 MG/DL (ref 70–130)
GLUCOSE SERPL-MCNC: 117 MG/DL (ref 65–99)
GLUCOSE UR STRIP-MCNC: NEGATIVE MG/DL
HCT VFR BLD AUTO: 40 % (ref 34–46.6)
HGB BLD-MCNC: 12.4 G/DL (ref 12–15.9)
HGB UR QL STRIP.AUTO: NEGATIVE
HOLD SPECIMEN: NORMAL
HOLD SPECIMEN: NORMAL
HYALINE CASTS UR QL AUTO: ABNORMAL /LPF
IMM GRANULOCYTES # BLD AUTO: 0.07 10*3/MM3 (ref 0–0.05)
IMM GRANULOCYTES NFR BLD AUTO: 0.5 % (ref 0–0.5)
KETONES UR QL STRIP: ABNORMAL
LEUKOCYTE ESTERASE UR QL STRIP.AUTO: ABNORMAL
LYMPHOCYTES # BLD AUTO: 1.67 10*3/MM3 (ref 0.7–3.1)
LYMPHOCYTES NFR BLD AUTO: 11.8 % (ref 19.6–45.3)
MAGNESIUM SERPL-MCNC: 2 MG/DL (ref 1.6–2.4)
MCH RBC QN AUTO: 28.1 PG (ref 26.6–33)
MCHC RBC AUTO-ENTMCNC: 31 G/DL (ref 31.5–35.7)
MCV RBC AUTO: 90.7 FL (ref 79–97)
MONOCYTES # BLD AUTO: 1.07 10*3/MM3 (ref 0.1–0.9)
MONOCYTES NFR BLD AUTO: 7.6 % (ref 5–12)
NEUTROPHILS NFR BLD AUTO: 11.07 10*3/MM3 (ref 1.7–7)
NEUTROPHILS NFR BLD AUTO: 78.3 % (ref 42.7–76)
NITRITE UR QL STRIP: NEGATIVE
NRBC BLD AUTO-RTO: 0 /100 WBC (ref 0–0.2)
NT-PROBNP SERPL-MCNC: 264.6 PG/ML (ref 0–900)
PH UR STRIP.AUTO: <=5 [PH] (ref 5–8)
PLATELET # BLD AUTO: 251 10*3/MM3 (ref 140–450)
PMV BLD AUTO: 11.1 FL (ref 6–12)
POTASSIUM SERPL-SCNC: 4.3 MMOL/L (ref 3.5–5.2)
PROT SERPL-MCNC: 6.5 G/DL (ref 6–8.5)
PROT UR QL STRIP: NEGATIVE
QT INTERVAL: 448 MS
QTC INTERVAL: 506 MS
RBC # BLD AUTO: 4.41 10*6/MM3 (ref 3.77–5.28)
RBC # UR: ABNORMAL /HPF
REF LAB TEST METHOD: ABNORMAL
SARS-COV-2 RNA RESP QL NAA+PROBE: NOT DETECTED
SODIUM SERPL-SCNC: 141 MMOL/L (ref 136–145)
SP GR UR STRIP: 1.03 (ref 1–1.03)
SQUAMOUS #/AREA URNS HPF: ABNORMAL /HPF
TROPONIN T SERPL-MCNC: <0.01 NG/ML (ref 0–0.03)
TROPONIN T SERPL-MCNC: <0.01 NG/ML (ref 0–0.03)
UROBILINOGEN UR QL STRIP: ABNORMAL
WBC # BLD AUTO: 14.14 10*3/MM3 (ref 3.4–10.8)
WBC UR QL AUTO: ABNORMAL /HPF
WHOLE BLOOD HOLD SPECIMEN: NORMAL
WHOLE BLOOD HOLD SPECIMEN: NORMAL

## 2021-09-15 PROCEDURE — 80053 COMPREHEN METABOLIC PANEL: CPT | Performed by: PHYSICIAN ASSISTANT

## 2021-09-15 PROCEDURE — C9803 HOPD COVID-19 SPEC COLLECT: HCPCS

## 2021-09-15 PROCEDURE — 82962 GLUCOSE BLOOD TEST: CPT

## 2021-09-15 PROCEDURE — G0378 HOSPITAL OBSERVATION PER HR: HCPCS

## 2021-09-15 PROCEDURE — 99285 EMERGENCY DEPT VISIT HI MDM: CPT

## 2021-09-15 PROCEDURE — 71045 X-RAY EXAM CHEST 1 VIEW: CPT

## 2021-09-15 PROCEDURE — 93005 ELECTROCARDIOGRAM TRACING: CPT | Performed by: STUDENT IN AN ORGANIZED HEALTH CARE EDUCATION/TRAINING PROGRAM

## 2021-09-15 PROCEDURE — 71045 X-RAY EXAM CHEST 1 VIEW: CPT | Performed by: RADIOLOGY

## 2021-09-15 PROCEDURE — 96372 THER/PROPH/DIAG INJ SC/IM: CPT

## 2021-09-15 PROCEDURE — 93010 ELECTROCARDIOGRAM REPORT: CPT | Performed by: INTERNAL MEDICINE

## 2021-09-15 PROCEDURE — 87040 BLOOD CULTURE FOR BACTERIA: CPT | Performed by: NURSE PRACTITIONER

## 2021-09-15 PROCEDURE — 70450 CT HEAD/BRAIN W/O DYE: CPT | Performed by: RADIOLOGY

## 2021-09-15 PROCEDURE — 85025 COMPLETE CBC W/AUTO DIFF WBC: CPT | Performed by: PHYSICIAN ASSISTANT

## 2021-09-15 PROCEDURE — 83735 ASSAY OF MAGNESIUM: CPT | Performed by: NURSE PRACTITIONER

## 2021-09-15 PROCEDURE — 87636 SARSCOV2 & INF A&B AMP PRB: CPT | Performed by: PHYSICIAN ASSISTANT

## 2021-09-15 PROCEDURE — 83880 ASSAY OF NATRIURETIC PEPTIDE: CPT | Performed by: HOSPITALIST

## 2021-09-15 PROCEDURE — 84484 ASSAY OF TROPONIN QUANT: CPT | Performed by: PHYSICIAN ASSISTANT

## 2021-09-15 PROCEDURE — 81001 URINALYSIS AUTO W/SCOPE: CPT | Performed by: PHYSICIAN ASSISTANT

## 2021-09-15 PROCEDURE — 70450 CT HEAD/BRAIN W/O DYE: CPT

## 2021-09-15 PROCEDURE — 25010000002 HEPARIN (PORCINE) PER 1000 UNITS: Performed by: HOSPITALIST

## 2021-09-15 PROCEDURE — 99219 PR INITIAL OBSERVATION CARE/DAY 50 MINUTES: CPT | Performed by: NURSE PRACTITIONER

## 2021-09-15 PROCEDURE — 94799 UNLISTED PULMONARY SVC/PX: CPT

## 2021-09-15 PROCEDURE — 84484 ASSAY OF TROPONIN QUANT: CPT | Performed by: HOSPITALIST

## 2021-09-15 PROCEDURE — 87086 URINE CULTURE/COLONY COUNT: CPT | Performed by: NURSE PRACTITIONER

## 2021-09-15 RX ORDER — SODIUM CHLORIDE 0.9 % (FLUSH) 0.9 %
10 SYRINGE (ML) INJECTION AS NEEDED
Status: DISCONTINUED | OUTPATIENT
Start: 2021-09-15 | End: 2021-09-17 | Stop reason: HOSPADM

## 2021-09-15 RX ORDER — NITROGLYCERIN 0.4 MG/1
0.4 TABLET SUBLINGUAL
Status: DISCONTINUED | OUTPATIENT
Start: 2021-09-15 | End: 2021-09-17 | Stop reason: HOSPADM

## 2021-09-15 RX ORDER — SODIUM CHLORIDE 9 MG/ML
75 INJECTION, SOLUTION INTRAVENOUS CONTINUOUS
Status: DISCONTINUED | OUTPATIENT
Start: 2021-09-15 | End: 2021-09-17 | Stop reason: HOSPADM

## 2021-09-15 RX ORDER — ASPIRIN 81 MG/1
81 TABLET ORAL DAILY
Status: DISCONTINUED | OUTPATIENT
Start: 2021-09-16 | End: 2021-09-17 | Stop reason: HOSPADM

## 2021-09-15 RX ORDER — SODIUM CHLORIDE 0.9 % (FLUSH) 0.9 %
10 SYRINGE (ML) INJECTION EVERY 12 HOURS SCHEDULED
Status: DISCONTINUED | OUTPATIENT
Start: 2021-09-15 | End: 2021-09-17 | Stop reason: HOSPADM

## 2021-09-15 RX ORDER — HEPARIN SODIUM 5000 [USP'U]/ML
5000 INJECTION, SOLUTION INTRAVENOUS; SUBCUTANEOUS EVERY 12 HOURS SCHEDULED
Status: DISCONTINUED | OUTPATIENT
Start: 2021-09-15 | End: 2021-09-17 | Stop reason: HOSPADM

## 2021-09-15 RX ADMIN — SODIUM CHLORIDE 1000 ML: 9 INJECTION, SOLUTION INTRAVENOUS at 19:07

## 2021-09-15 RX ADMIN — SODIUM CHLORIDE 75 ML/HR: 9 INJECTION, SOLUTION INTRAVENOUS at 22:33

## 2021-09-15 RX ADMIN — HEPARIN SODIUM 5000 UNITS: 5000 INJECTION INTRAVENOUS; SUBCUTANEOUS at 22:34

## 2021-09-15 NOTE — ED NOTES
ekg preformed by tech at 1730 given to Dr. Dai at 1731     [facility]  09/15/21 1733

## 2021-09-15 NOTE — ED NOTES
MEDICAL SCREENING:    Reason for Visit: syncope      Patient initially seen in triage.  The patient was advised further evaluation and diagnostic testing will be needed, some of the treatment and testing will be initiated in the lobby in order to begin the process.  The patient will be returned to the waiting area for the time being and possibly be re-assessed by a subsequent ED provider.  The patient will be brought back to the treatment area in as timely manner as possible.       Justice Nunn PA-C  09/15/21 2521

## 2021-09-15 NOTE — ED NOTES
Orthostatic Vitals   Lying : /65 HR 73   Sitting /74 HR 77  Standing /71 HR 69       Chuck TOTH made aware.        Vicky Caceres, SHELLEY  09/15/21 [id number]
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
## 2021-09-16 ENCOUNTER — APPOINTMENT (OUTPATIENT)
Dept: CARDIOLOGY | Facility: HOSPITAL | Age: 71
End: 2021-09-16

## 2021-09-16 ENCOUNTER — APPOINTMENT (OUTPATIENT)
Dept: ULTRASOUND IMAGING | Facility: HOSPITAL | Age: 71
End: 2021-09-16

## 2021-09-16 LAB
ALBUMIN SERPL-MCNC: 3.62 G/DL (ref 3.5–5.2)
ALBUMIN/GLOB SERPL: 2 G/DL
ALP SERPL-CCNC: 108 U/L (ref 39–117)
ALT SERPL W P-5'-P-CCNC: 17 U/L (ref 1–33)
ANION GAP SERPL CALCULATED.3IONS-SCNC: 7 MMOL/L (ref 5–15)
AST SERPL-CCNC: 15 U/L (ref 1–32)
BACTERIA SPEC AEROBE CULT: NO GROWTH
BASOPHILS # BLD AUTO: 0.06 10*3/MM3 (ref 0–0.2)
BASOPHILS NFR BLD AUTO: 0.6 % (ref 0–1.5)
BH CV ECHO MEAS - ACS: 2.3 CM
BH CV ECHO MEAS - AO ROOT AREA (BSA CORRECTED): 1.6
BH CV ECHO MEAS - AO ROOT AREA: 7.5 CM^2
BH CV ECHO MEAS - AO ROOT DIAM: 3.1 CM
BH CV ECHO MEAS - BSA(HAYCOCK): 2 M^2
BH CV ECHO MEAS - BSA: 1.9 M^2
BH CV ECHO MEAS - BZI_BMI: 40 KILOGRAMS/M^2
BH CV ECHO MEAS - BZI_METRIC_HEIGHT: 152.4 CM
BH CV ECHO MEAS - BZI_METRIC_WEIGHT: 93 KG
BH CV ECHO MEAS - EDV(CUBED): 54.9 ML
BH CV ECHO MEAS - EDV(TEICH): 62 ML
BH CV ECHO MEAS - EF(CUBED): 64.1 %
BH CV ECHO MEAS - EF(TEICH): 56.4 %
BH CV ECHO MEAS - ESV(CUBED): 19.7 ML
BH CV ECHO MEAS - ESV(TEICH): 27 ML
BH CV ECHO MEAS - FS: 28.9 %
BH CV ECHO MEAS - IVS/LVPW: 1
BH CV ECHO MEAS - IVSD: 1 CM
BH CV ECHO MEAS - LA DIMENSION: 2.6 CM
BH CV ECHO MEAS - LA/AO: 0.84
BH CV ECHO MEAS - LV MASS(C)D: 117.3 GRAMS
BH CV ECHO MEAS - LV MASS(C)DI: 62.2 GRAMS/M^2
BH CV ECHO MEAS - LVIDD: 3.8 CM
BH CV ECHO MEAS - LVIDS: 2.7 CM
BH CV ECHO MEAS - LVOT AREA (M): 2.5 CM^2
BH CV ECHO MEAS - LVOT AREA: 2.5 CM^2
BH CV ECHO MEAS - LVOT DIAM: 1.8 CM
BH CV ECHO MEAS - LVPWD: 1 CM
BH CV ECHO MEAS - MV A MAX VEL: 143 CM/SEC
BH CV ECHO MEAS - MV DEC SLOPE: 577 CM/SEC^2
BH CV ECHO MEAS - MV DEC TIME: 0.2 SEC
BH CV ECHO MEAS - MV E MAX VEL: 114 CM/SEC
BH CV ECHO MEAS - MV E/A: 0.8
BH CV ECHO MEAS - PA MAX PG: 6.4 MMHG
BH CV ECHO MEAS - PA V2 MAX: 126 CM/SEC
BH CV ECHO MEAS - RAP SYSTOLE: 3 MMHG
BH CV ECHO MEAS - RVSP: 21 MMHG
BH CV ECHO MEAS - SI(CUBED): 18.7 ML/M^2
BH CV ECHO MEAS - SI(TEICH): 18.5 ML/M^2
BH CV ECHO MEAS - SV(CUBED): 35.2 ML
BH CV ECHO MEAS - SV(TEICH): 34.9 ML
BH CV ECHO MEAS - TR MAX VEL: 212 CM/SEC
BILIRUB SERPL-MCNC: 0.2 MG/DL (ref 0–1.2)
BUN SERPL-MCNC: 22 MG/DL (ref 8–23)
BUN/CREAT SERPL: 27.8 (ref 7–25)
CALCIUM SPEC-SCNC: 8.7 MG/DL (ref 8.6–10.5)
CHLORIDE SERPL-SCNC: 108 MMOL/L (ref 98–107)
CHOLEST SERPL-MCNC: 134 MG/DL (ref 0–200)
CO2 SERPL-SCNC: 24 MMOL/L (ref 22–29)
CREAT SERPL-MCNC: 0.79 MG/DL (ref 0.57–1)
DEPRECATED RDW RBC AUTO: 45.8 FL (ref 37–54)
EOSINOPHIL # BLD AUTO: 0.25 10*3/MM3 (ref 0–0.4)
EOSINOPHIL NFR BLD AUTO: 2.5 % (ref 0.3–6.2)
ERYTHROCYTE [DISTWIDTH] IN BLOOD BY AUTOMATED COUNT: 13.7 % (ref 12.3–15.4)
GFR SERPL CREATININE-BSD FRML MDRD: 72 ML/MIN/1.73
GLOBULIN UR ELPH-MCNC: 1.8 GM/DL
GLUCOSE SERPL-MCNC: 110 MG/DL (ref 65–99)
HBA1C MFR BLD: 5.6 % (ref 4.8–5.6)
HCT VFR BLD AUTO: 34.9 % (ref 34–46.6)
HDLC SERPL-MCNC: 50 MG/DL (ref 40–60)
HGB BLD-MCNC: 10.6 G/DL (ref 12–15.9)
IMM GRANULOCYTES # BLD AUTO: 0.05 10*3/MM3 (ref 0–0.05)
IMM GRANULOCYTES NFR BLD AUTO: 0.5 % (ref 0–0.5)
LDLC SERPL CALC-MCNC: 67 MG/DL (ref 0–100)
LDLC/HDLC SERPL: 1.32 {RATIO}
LYMPHOCYTES # BLD AUTO: 2.66 10*3/MM3 (ref 0.7–3.1)
LYMPHOCYTES NFR BLD AUTO: 26.5 % (ref 19.6–45.3)
MAXIMAL PREDICTED HEART RATE: 149 BPM
MCH RBC QN AUTO: 27.8 PG (ref 26.6–33)
MCHC RBC AUTO-ENTMCNC: 30.4 G/DL (ref 31.5–35.7)
MCV RBC AUTO: 91.6 FL (ref 79–97)
MONOCYTES # BLD AUTO: 0.94 10*3/MM3 (ref 0.1–0.9)
MONOCYTES NFR BLD AUTO: 9.4 % (ref 5–12)
NEUTROPHILS NFR BLD AUTO: 6.07 10*3/MM3 (ref 1.7–7)
NEUTROPHILS NFR BLD AUTO: 60.5 % (ref 42.7–76)
NRBC BLD AUTO-RTO: 0 /100 WBC (ref 0–0.2)
PLATELET # BLD AUTO: 191 10*3/MM3 (ref 140–450)
PMV BLD AUTO: 10.9 FL (ref 6–12)
POTASSIUM SERPL-SCNC: 4.2 MMOL/L (ref 3.5–5.2)
PROT SERPL-MCNC: 5.4 G/DL (ref 6–8.5)
RBC # BLD AUTO: 3.81 10*6/MM3 (ref 3.77–5.28)
SODIUM SERPL-SCNC: 139 MMOL/L (ref 136–145)
STRESS TARGET HR: 127 BPM
TRIGL SERPL-MCNC: 90 MG/DL (ref 0–150)
TROPONIN T SERPL-MCNC: <0.01 NG/ML (ref 0–0.03)
VLDLC SERPL-MCNC: 17 MG/DL (ref 5–40)
WBC # BLD AUTO: 10.03 10*3/MM3 (ref 3.4–10.8)

## 2021-09-16 PROCEDURE — 93306 TTE W/DOPPLER COMPLETE: CPT | Performed by: INTERNAL MEDICINE

## 2021-09-16 PROCEDURE — 84484 ASSAY OF TROPONIN QUANT: CPT | Performed by: HOSPITALIST

## 2021-09-16 PROCEDURE — 93880 EXTRACRANIAL BILAT STUDY: CPT

## 2021-09-16 PROCEDURE — 85025 COMPLETE CBC W/AUTO DIFF WBC: CPT | Performed by: HOSPITALIST

## 2021-09-16 PROCEDURE — 80053 COMPREHEN METABOLIC PANEL: CPT | Performed by: HOSPITALIST

## 2021-09-16 PROCEDURE — 96372 THER/PROPH/DIAG INJ SC/IM: CPT

## 2021-09-16 PROCEDURE — G0378 HOSPITAL OBSERVATION PER HR: HCPCS

## 2021-09-16 PROCEDURE — 93306 TTE W/DOPPLER COMPLETE: CPT

## 2021-09-16 PROCEDURE — 80061 LIPID PANEL: CPT | Performed by: HOSPITALIST

## 2021-09-16 PROCEDURE — 99226 PR SBSQ OBSERVATION CARE/DAY 35 MINUTES: CPT | Performed by: INTERNAL MEDICINE

## 2021-09-16 PROCEDURE — 99214 OFFICE O/P EST MOD 30 MIN: CPT | Performed by: INTERNAL MEDICINE

## 2021-09-16 PROCEDURE — 83036 HEMOGLOBIN GLYCOSYLATED A1C: CPT | Performed by: HOSPITALIST

## 2021-09-16 PROCEDURE — 25010000002 HEPARIN (PORCINE) PER 1000 UNITS: Performed by: HOSPITALIST

## 2021-09-16 PROCEDURE — 93880 EXTRACRANIAL BILAT STUDY: CPT | Performed by: RADIOLOGY

## 2021-09-16 PROCEDURE — 94799 UNLISTED PULMONARY SVC/PX: CPT

## 2021-09-16 RX ORDER — FUROSEMIDE 20 MG/1
20 TABLET ORAL DAILY PRN
Status: CANCELLED | OUTPATIENT
Start: 2021-09-16

## 2021-09-16 RX ORDER — METOPROLOL SUCCINATE 50 MG/1
50 TABLET, EXTENDED RELEASE ORAL DAILY
Status: DISCONTINUED | OUTPATIENT
Start: 2021-09-16 | End: 2021-09-17 | Stop reason: HOSPADM

## 2021-09-16 RX ORDER — ATORVASTATIN CALCIUM 10 MG/1
10 TABLET, FILM COATED ORAL DAILY
Status: CANCELLED | OUTPATIENT
Start: 2021-09-16

## 2021-09-16 RX ORDER — NITROGLYCERIN 0.4 MG/1
0.4 TABLET SUBLINGUAL
Status: CANCELLED | OUTPATIENT
Start: 2021-09-16

## 2021-09-16 RX ORDER — IBUPROFEN 400 MG/1
400 TABLET ORAL 3 TIMES DAILY PRN
Status: CANCELLED | OUTPATIENT
Start: 2021-09-16

## 2021-09-16 RX ORDER — LISINOPRIL 2.5 MG/1
5 TABLET ORAL DAILY
Status: DISCONTINUED | OUTPATIENT
Start: 2021-09-16 | End: 2021-09-17 | Stop reason: HOSPADM

## 2021-09-16 RX ORDER — BUPROPION HYDROCHLORIDE 75 MG/1
75 TABLET ORAL NIGHTLY
Status: CANCELLED | OUTPATIENT
Start: 2021-09-16

## 2021-09-16 RX ORDER — ISOSORBIDE MONONITRATE 60 MG/1
120 TABLET, EXTENDED RELEASE ORAL
Status: CANCELLED | OUTPATIENT
Start: 2021-09-16

## 2021-09-16 RX ORDER — DULOXETIN HYDROCHLORIDE 30 MG/1
30 CAPSULE, DELAYED RELEASE ORAL 2 TIMES DAILY
Status: CANCELLED | OUTPATIENT
Start: 2021-09-16

## 2021-09-16 RX ORDER — ATORVASTATIN CALCIUM 10 MG/1
10 TABLET, FILM COATED ORAL DAILY
Status: DISCONTINUED | OUTPATIENT
Start: 2021-09-16 | End: 2021-09-17 | Stop reason: HOSPADM

## 2021-09-16 RX ADMIN — METOPROLOL SUCCINATE 50 MG: 50 TABLET, EXTENDED RELEASE ORAL at 17:47

## 2021-09-16 RX ADMIN — ATORVASTATIN CALCIUM 10 MG: 10 TABLET, FILM COATED ORAL at 17:47

## 2021-09-16 RX ADMIN — SODIUM CHLORIDE, PRESERVATIVE FREE 10 ML: 5 INJECTION INTRAVENOUS at 08:14

## 2021-09-16 RX ADMIN — SODIUM CHLORIDE, PRESERVATIVE FREE 10 ML: 5 INJECTION INTRAVENOUS at 21:53

## 2021-09-16 RX ADMIN — LISINOPRIL 5 MG: 2.5 TABLET ORAL at 17:47

## 2021-09-16 RX ADMIN — ASPIRIN 81 MG: 81 TABLET, COATED ORAL at 08:14

## 2021-09-16 RX ADMIN — HEPARIN SODIUM 5000 UNITS: 5000 INJECTION INTRAVENOUS; SUBCUTANEOUS at 21:52

## 2021-09-16 RX ADMIN — HEPARIN SODIUM 5000 UNITS: 5000 INJECTION INTRAVENOUS; SUBCUTANEOUS at 08:14
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
## 2021-09-16 NOTE — PLAN OF CARE
Goal Outcome Evaluation:               Admit from ER. Cardiac workup in progress, pending cardio consult. Cont with plan of care

## 2021-09-16 NOTE — PROGRESS NOTES
[facility] HOSPITALIST PROGRESS NOTE     Patient Identification:  Name:  Julieta Holt  Age:  70 y.o.  Sex:  female  :  1950  MRN:  [id number]  Visit Number:  [id number]  ROOM: [address] [place] [address]     Primary Care Provider:  Joan Vasquez APRN    Length of stay in inpatient status:  0    Subjective     Chief Compliant:    Chief Complaint   Patient presents with   • Syncope     History of Presenting Illness:    Patient stable today, no acute events overnight, no new complaints, denied any further syncope, reported preceding lightheadedness and dizziness/flushing prior to syncope, reported it was unwitnessed event but friend was getting out of the car and she awoke, no bowel or bladder incontinence, hit her head, had similar episode when standing once or twice before, has hx seizures as child, reportedly resolved, no home antiseizure medications, CT head unremarkable other than hematoma, have ordered MRI for further evaluate though, also of note on anxiety meds that can precipitate seizures and have held, psych consulted for alternatives, workup cardiac etiology as well, Cards following, repeat echo pending, b/l carotid US pending, holding home imdur, decreased home BB dose, continued home ACEI as is already low dose. Patient denies any fevers or chills, no further lightheadedness or dizziness.   Objective     Current Hospital Meds:aspirin, 81 mg, Oral, Daily  atorvastatin, 10 mg, Oral, Daily  [START ON 2021] cholecalciferol, 50,000 Units, Oral, Weekly  heparin (porcine), 5,000 Units, Subcutaneous, Q12H  lisinopril, 5 mg, Oral, Daily  metoprolol succinate XL, 50 mg, Oral, Daily  sodium chloride, 10 mL, Intravenous, Q12H    sodium chloride, 75 mL/hr, Last Rate: 75 mL/hr (21 1449)        Current Antimicrobial Therapy:  Anti-Infectives (From admission, onward)    None        Current Diuretic Therapy:  Diuretics (From admission, onward)    None         ----------------------------------------------------------------------------------------------------------------------  Vital Signs:  Temp:  [98.2 °F (36.8 °C)-98.4 °F (36.9 °C)] 98.3 °F (36.8 °C)  Heart Rate:  [66-84] 74  Resp:  [16-20] 20  BP: (121-161)/(58-88) 154/60  SpO2:  [93 %-98 %] 94 %  on   ;   Device (Oxygen Therapy): room air  Body mass index is 40.04 kg/m².    Wt Readings from Last 3 Encounters:   09/16/21 93 kg (205 lb)   08/18/21 88.9 kg (196 lb)   06/28/21 88.9 kg (196 lb)     Intake & Output (last 3 days)       09/13 0701 - 09/14 0700 09/14 0701 - 09/15 0700 09/15 0701 - 09/16 0700 09/16 0701 - 09/17 0700    P.O.   120 480    Total Intake(mL/kg)   120 (1.3) 480 (5.2)    Urine (mL/kg/hr)   300 900 (1.2)    Total Output   300 900    Net   -180 -420                Diet Regular  ----------------------------------------------------------------------------------------------------------------------  Physical exam:  Constitutional:  Well-developed and well-nourished.  No acute distress.      HENT:  Head:  Normocephalic and R parietal swelling noted,  Mouth:  Moist mucous membranes.    Eyes:  Conjunctivae and EOM are normal. No scleral icterus.    Neck:  Neck supple.  No JVD present.    Cardiovascular:  Normal rate, regular rhythm and normal heart sounds with no murmur.  Pulmonary/Chest:  No respiratory distress, no wheezes, no crackles, on room air  Abdominal:  Soft. No distension and no tenderness.   Musculoskeletal:  No tenderness, and no deformity.    Neurological:  Alert and oriented to person, place, and time.  No gross focal deficits  Skin:  Skin is warm and dry. No rash noted. No pallor.   Peripheral vascular:  No clubbing, no cyanosis, no edema.  ----------------------------------------------------------------------------------------------------------------------  Results from last 7 days   Lab Units 09/16/21  0136 09/15/21  1727   WBC 10*3/mm3 10.03 14.14*   HEMOGLOBIN g/dL 10.6* 12.4    HEMATOCRIT % 34.9 40.0   MCV fL 91.6 90.7   MCHC g/dL 30.4* 31.0*   PLATELETS 10*3/mm3 191 251         Results from last 7 days   Lab Units 09/16/21  0136 09/15/21  1959 09/15/21  1727   SODIUM mmol/L 139  --  141   POTASSIUM mmol/L 4.2  --  4.3   MAGNESIUM mg/dL  --  2.0  --    CHLORIDE mmol/L 108*  --  105   CO2 mmol/L 24.0  --  24.4   BUN mg/dL 22  --  20   CREATININE mg/dL 0.79  --  0.98   EGFR IF NONAFRICN AM mL/min/1.73 72  --  56*   CALCIUM mg/dL 8.7  --  9.6   GLUCOSE mg/dL 110*  --  117*   ALBUMIN g/dL 3.62  --  4.33   BILIRUBIN mg/dL 0.2  --  0.3   ALK PHOS U/L 108  --  118*   AST (SGOT) U/L 15  --  19   ALT (SGPT) U/L 17  --  20   Estimated Creatinine Clearance: 65.7 mL/min (by C-G formula based on SCr of 0.79 mg/dL).  No results found for: AMMONIA  Results from last 7 days   Lab Units 09/16/21  0136 09/15/21  1959 09/15/21  1727   TROPONIN T ng/mL <0.010 <0.010 <0.010     Results from last 7 days   Lab Units 09/15/21  1727   PROBNP pg/mL 264.6     Results from last 7 days   Lab Units 09/16/21 0136   CHOLESTEROL mg/dL 134   TRIGLYCERIDES mg/dL 90   HDL CHOL mg/dL 50   LDL CHOL mg/dL 67     Hemoglobin A1C   Date/Time Value Ref Range Status   09/16/2021 0136 5.60 4.80 - 5.60 % Final     Glucose   Date/Time Value Ref Range Status   09/15/2021 1734 105 70 - 130 mg/dL Final     Comment:     RN Notified Meter: [id number] : [id number] ANTHONYMEGA HIGUERA     No results found for: TSH, FREET4  No results found for: PREGTESTUR, PREGSERUM, HCG, HCGQUANT  Pain Management Panel    There is no flowsheet data to display.       Brief Urine Lab Results  (Last result in the past 365 days)      Color   Clarity   Blood   Leuk Est   Nitrite   Protein   CREAT   Urine HCG        09/15/21 1734 Yellow Clear Negative Trace Negative Negative             No results found for: BLOODCX  Urine Culture   Date Value Ref Range Status   09/15/2021 No growth  Preliminary     No results found for: WOUNDCX  No results found for:  STOOLCX  No results found for: RESPCX  No results found for: AFBCX      I have personally looked at the labs and they are summarized above.  ----------------------------------------------------------------------------------------------------------------------  Detailed radiology reports for the last 24 hours:  Imaging Results (Last 24 Hours)     ** No results found for the last 24 hours. **        Assessment & Plan    70F Morbid Obesity by BMI PMH  seizures,  Hyperlipidemia, HTN, GERD, presented for syncope.    #Syncope c/b L posterior parietal scalp hematoma  - Patient presented w/ syncopal event day of presentation, was standing walking in to restaurant and felt lightheaded, nauseas, passed out, no bowel bladder incontinence, has had several times in the past, mostly w/ standing, does have hx seizures as child.   - Admission labs showed essentially unremarkable other than WBC count 14K  - EKG showed  - Echo's recently 7/2021 showed LVEF 46-50%, diastolic dysfunction, abnormal AV w/ noted sclerosis, trace AR, no stenosis, mild calcified MV w/ mild MR, no stenosis.  - CT Head showed no acute intracranial findings, L posterior parietal scalp hematoma  - Cards consulted; Following  - Repeat limited Echo pending and b/l carotid US pending  - S/p 1L fluids bolus, Continue to monitor on telemetry, monitor for seizures and further syncope episodes  - Lower clinical suspicion for seizures, suspect more vasovagal related, f/u cards recs    #HTN/HLD  #Chronic HFrEF  #Prolonged QTc  - Labs showed trops negative, proBNP 264  - EKG and echo as per above  - Cards consulted;Following as per above  - Continue home ASA 81, statin,  - Continue home ACEI, home BB but at lower dose  - Holding home Imur, holding home lasix for now due to risk of hypotension  - Continue to monitor on tele, strict I/O's, daily weights, trend HR and BP    #Hx Seizures, reportedly resolved  - No home antiepileptic medications, supportive care, monitor for  seizures    #COPD/Emphysema w/o Acute Exacerbation  - Continue home regimen pending med rec, Add Duonebs PRN  - Will order COPD rescue kit at time of discharge    #Anxiety/Depression  - Held home Bupropion as can lower seizure threshold, held home duloxetine as can cause seizures, of note bupropion can reportedly increase concentration of duloxetine, thus further increasing risk of seizures; Consult psych for further recs on anti-anxiety meds    #SIRS and possible underlying UTI  - UA 2/ 13-20 WBC's, no bacteria, 7-12 sq epis, neg nitrites, and asymptomatic; Culture pending, hold Abx, Bcx's NGTD    #Morbid Obesity by BMI  - BMI 40, complicates all aspects of care    F: Oral  E: Monitor & Replace PRN  N: Regular  PPx: SQH  Code: Full Code    Dispo: Pending workup and clinical improvement    *This patient is considered high risk secondary to syncope, further workup w/ cardiac imaging.    VTE Prophylaxis:   Mechanical Order History:     None      Pharmalogical Order History:      Ordered     Dose Route Frequency Stop    09/15/21 7075  heparin (porcine) 5000 UNIT/ML injection 5,000 Units      5,000 Units SC Every 12 Hours Scheduled --              Raphael Garcia MD  [facility]  09/16/21  15:11 EDT

## 2021-09-16 NOTE — ED NOTES
Report called to Akilah ROSA. Pt will transported to floor as soon as her COVID swab comes back from lab.      Vicky Caceres RN  09/15/21 2019

## 2021-09-16 NOTE — ED NOTES
Pt being transported to  at this time. IV intact. NADN. ERICKSON.      Vicky Caceres, RN  09/15/21 2045

## 2021-09-17 ENCOUNTER — APPOINTMENT (OUTPATIENT)
Dept: MRI IMAGING | Facility: HOSPITAL | Age: 71
End: 2021-09-17

## 2021-09-17 VITALS
RESPIRATION RATE: 18 BRPM | TEMPERATURE: 98 F | HEART RATE: 80 BPM | WEIGHT: 205.2 LBS | DIASTOLIC BLOOD PRESSURE: 58 MMHG | HEIGHT: 60 IN | BODY MASS INDEX: 40.29 KG/M2 | SYSTOLIC BLOOD PRESSURE: 117 MMHG | OXYGEN SATURATION: 96 %

## 2021-09-17 DIAGNOSIS — R55 VASOVAGAL SYNCOPE: Primary | ICD-10-CM

## 2021-09-17 PROCEDURE — 96372 THER/PROPH/DIAG INJ SC/IM: CPT

## 2021-09-17 PROCEDURE — 70553 MRI BRAIN STEM W/O & W/DYE: CPT

## 2021-09-17 PROCEDURE — 99213 OFFICE O/P EST LOW 20 MIN: CPT | Performed by: SPECIALIST

## 2021-09-17 PROCEDURE — 0 GADOBENATE DIMEGLUMINE 529 MG/ML SOLUTION: Performed by: INTERNAL MEDICINE

## 2021-09-17 PROCEDURE — G0378 HOSPITAL OBSERVATION PER HR: HCPCS

## 2021-09-17 PROCEDURE — A9577 INJ MULTIHANCE: HCPCS | Performed by: INTERNAL MEDICINE

## 2021-09-17 PROCEDURE — 70553 MRI BRAIN STEM W/O & W/DYE: CPT | Performed by: RADIOLOGY

## 2021-09-17 PROCEDURE — 25010000002 HEPARIN (PORCINE) PER 1000 UNITS: Performed by: HOSPITALIST

## 2021-09-17 PROCEDURE — 99217 PR OBSERVATION CARE DISCHARGE MANAGEMENT: CPT | Performed by: INTERNAL MEDICINE

## 2021-09-17 RX ORDER — DOXYCYCLINE 100 MG/1
CAPSULE ORAL
Qty: 14 CAPSULE | Refills: 0 | Status: ON HOLD | OUTPATIENT
Start: 2021-09-17 | End: 2022-06-22

## 2021-09-17 RX ORDER — METOPROLOL SUCCINATE 50 MG/1
50 TABLET, EXTENDED RELEASE ORAL DAILY
Qty: 30 TABLET | Refills: 0 | Status: SHIPPED | OUTPATIENT
Start: 2021-09-18

## 2021-09-17 RX ORDER — PREDNISONE 20 MG/1
40 TABLET ORAL DAILY
Qty: 10 TABLET | Refills: 0
Start: 2021-09-17 | End: 2021-09-17 | Stop reason: SDUPTHER

## 2021-09-17 RX ORDER — IPRATROPIUM BROMIDE AND ALBUTEROL SULFATE 2.5; .5 MG/3ML; MG/3ML
SOLUTION RESPIRATORY (INHALATION)
Qty: 18 ML | Refills: 0
Start: 2021-09-17 | End: 2021-09-17 | Stop reason: SDUPTHER

## 2021-09-17 RX ORDER — PREDNISONE 20 MG/1
40 TABLET ORAL DAILY
Qty: 10 TABLET | Refills: 0 | Status: SHIPPED | OUTPATIENT
Start: 2021-09-17 | End: 2022-02-10

## 2021-09-17 RX ORDER — DOXYCYCLINE 100 MG/1
CAPSULE ORAL
Qty: 14 CAPSULE | Refills: 0
Start: 2021-09-17 | End: 2021-09-17 | Stop reason: SDUPTHER

## 2021-09-17 RX ORDER — IPRATROPIUM BROMIDE AND ALBUTEROL SULFATE 2.5; .5 MG/3ML; MG/3ML
SOLUTION RESPIRATORY (INHALATION)
Qty: 18 ML | Refills: 0 | Status: SHIPPED | OUTPATIENT
Start: 2021-09-17

## 2021-09-17 RX ADMIN — LISINOPRIL 5 MG: 2.5 TABLET ORAL at 09:22

## 2021-09-17 RX ADMIN — GADOBENATE DIMEGLUMINE 19 ML: 529 INJECTION, SOLUTION INTRAVENOUS at 12:25

## 2021-09-17 RX ADMIN — METOPROLOL SUCCINATE 50 MG: 50 TABLET, EXTENDED RELEASE ORAL at 09:22

## 2021-09-17 RX ADMIN — HEPARIN SODIUM 5000 UNITS: 5000 INJECTION INTRAVENOUS; SUBCUTANEOUS at 09:22

## 2021-09-17 RX ADMIN — ATORVASTATIN CALCIUM 10 MG: 10 TABLET, FILM COATED ORAL at 09:22

## 2021-09-17 RX ADMIN — ASPIRIN 81 MG: 81 TABLET, COATED ORAL at 09:22

## 2021-09-17 RX ADMIN — SODIUM CHLORIDE, PRESERVATIVE FREE 10 ML: 5 INJECTION INTRAVENOUS at 09:23

## 2021-09-17 NOTE — PROGRESS NOTES
Attempted to evaluate patient today, however she was out of the room for procedure.  Will attempt to revisit or have patient evaluated in the morning.

## 2021-09-17 NOTE — CASE MANAGEMENT/SOCIAL WORK
Discharge Planning Assessment  MARIAA Melchor     Patient Name: Julieta Holt  MRN: [id number]  Today's Date: 9/17/2021    Admit Date: 9/15/2021    Discharge Needs Assessment    No documentation.       Discharge Plan     Row Name 09/17/21 1448       Plan    Final Discharge Disposition Code  01 - home or self-care    Final Note Pt is being discharged home on this date. Pt's family to transport. No other needs indentified.            Ana Rosa Lujan

## 2021-09-17 NOTE — PROGRESS NOTES
LOS: 0 days     Name: Julieta Holt  Age/Sex: 70 y.o. female  :  1950        PCP: Joan Vasquez APRN  REF: No ref. provider found    Active Problems:    Syncope      Reason for follow-up: Syncope     Subjective       Subjective     Julieta Holt is a 70 year old female with a past medical history significant for seizures, hyperlipidemia, hypertension and GERD. Patient presented to the ED with complaints of syncope.    Interval History: Patient reports she is doing ok today. Denies any further dizziness. No arrhythmias noted on the monitor. Echocardiogram showed normal LV function. No chest pain.     Vital Signs  Temp:  [97.7 °F (36.5 °C)-98.3 °F (36.8 °C)] 97.7 °F (36.5 °C)  Heart Rate:  [62-77] 71  Resp:  [18-20] 18  BP: (137-161)/(54-88) 137/56     Vital Signs (last 72 hrs)        0700  -  09/15 0659 09/15 07  -   0659  07  -   0659  07  -   0909   Most Recent    Temp (°F)   98.2 -  98.4    97.7 -  98.3       97.7 (36.5)    Heart Rate   66 -  84    62 -  77       71    Resp   16 -  20    18 -  20       18    BP   121/58 -  153/67    137/56 -  161/75       137/56    SpO2 (%)   93 -  98    93 -  94       93        Documented weights    09/15/21 1706 09/15/21 1950 21 0500 21 0500   Weight: 90.7 kg (200 lb) 93 kg (205 lb) 93 kg (205 lb) 93.1 kg (205 lb 3.2 oz)      Body mass index is 40.08 kg/m².    Intake/Output Summary (Last 24 hours) at 2021 0909  Last data filed at 2021 0400  Gross per 24 hour   Intake 1260 ml   Output 2950 ml   Net -1690 ml     Objective    Objective       Physical Exam:     General Appearance:    Alert, cooperative, in no acute distress   Head:    Normocephalic, without obvious abnormality, atraumatic   Eyes:            Conjunctivae and sclerae normal, no   icterus, no pallor, corneas clear.   Neck:   No adenopathy, supple, trachea midline, no thyromegaly, no   carotid bruit, no JVD   Lungs:     Clear  to auscultation,respirations regular, even and                  unlabored    Heart:    Regular rhythm and normal rate, normal S1 and S2, no            murmur, no gallop, no rub, no click   Chest Wall:    No abnormalities observed   Abdomen:     Normal bowel sounds, no masses, no organomegaly, soft        non-tender, non-distended, no guarding, no rebound                tenderness   Extremities:   Moves all extremities well, no edema, no cyanosis, no             redness   Pulses:   Pulses palpable and equal bilaterally   Skin:   No bleeding, bruising or rash       Neurologic:   Alert and oriented      Results review       Results Review:   Results from last 7 days   Lab Units 09/16/21  0136 09/15/21  1727   WBC 10*3/mm3 10.03 14.14*   HEMOGLOBIN g/dL 10.6* 12.4   PLATELETS 10*3/mm3 191 251     Results from last 7 days   Lab Units 09/16/21  0136 09/15/21  1727   SODIUM mmol/L 139 141   POTASSIUM mmol/L 4.2 4.3   CHLORIDE mmol/L 108* 105   CO2 mmol/L 24.0 24.4   BUN mg/dL 22 20   CREATININE mg/dL 0.79 0.98   CALCIUM mg/dL 8.7 9.6   GLUCOSE mg/dL 110* 117*   ALT (SGPT) U/L 17 20   AST (SGOT) U/L 15 19     Results from last 7 days   Lab Units 09/16/21  0136 09/15/21  1959 09/15/21  1727   TROPONIN T ng/mL <0.010 <0.010 <0.010     Lab Results   Component Value Date    INR 0.96 11/17/2017     Lab Results   Component Value Date    MG 2.0 09/15/2021     Lab Results   Component Value Date    TRIG 90 09/16/2021    HDL 50 09/16/2021    LDL 67 09/16/2021      Imaging Results (Last 48 Hours)     Procedure Component Value Units Date/Time    US Carotid Bilateral [[id number]] Collected: 09/16/21 1620     Updated: 09/16/21 1624    Narrative:      EXAMINATION: US CAROTID BILATERAL-      Technique: Multiple real-time color Doppler images were acquired of  bilateral carotid arteries.     Stenosis measurements if obtained, were performed by the NASCET or  similar method.        CLINICAL INDICATION:     syncope; R55-Syncope and  collapse;  Q24.9-Congenital malformation of heart, unspecified     COMPARISON:    None     FINDINGS:        RIGHT:  No significant intimal thickening or plaque is noted. No occlusion.     RIGHT ICA PSV: 60 cm/s  RIGHT ICA EDV: 19.7 cm/s.   Right ICA/CCA Ratio: 0.8  Anterograde flow is demonstrated in RIGHT vertebral artery.     LEFT:  No significant intimal thickening or plaque is noted. No occlusion.     LEFT ICA PSV: 77.4 cm/s  LEFT EDV: 29.4 cm/s.   Left ICA/CCA Ratio: 0.9  Anterograde flow is demonstrated in LEFT vertebral artery.          Impression:      Impression:     No evidence of hemodynamically significant plaques or stenosis within  the carotid system at this time.     This report was finalized on 9/16/2021 4:22 PM by Dr. Jaden Rajput MD.       CT Head Without Contrast [[id number]] Collected: 09/15/21 1839     Updated: 09/15/21 1842    Narrative:      EXAM:    CT Head Without Intravenous Contrast     EXAM DATE:    9/15/2021 6:26 PM     CLINICAL HISTORY:    syncope     TECHNIQUE:    Axial computed tomography images of the head/brain without intravenous  contrast.  Sagittal and coronal reformatted images were created and  reviewed.  This CT exam was performed using one or more of the following  dose reduction techniques:  automated exposure control, adjustment of  the mA and/or kV according to patient size, and/or use of iterative  reconstruction technique.     COMPARISON:    No relevant prior studies available.     FINDINGS:    Brain:  No intracranial hemorrhage is identified.  No significant  white matter disease.    Ventricles:  Unremarkable.  No ventriculomegaly.    Bones/joints:  Unremarkable.  No acute fracture.    Soft tissues:  Left posterior parietal scalp hematoma is noted.    Sinuses:  Unremarkable as visualized.  No acute sinusitis.    Mastoid air cells:  Unremarkable as visualized.  No mastoid effusion.       Impression:      1.  No acute intracranial findings identified.  2.  Left posterior  parietal scalp hematoma.     This report was finalized on 9/15/2021 6:40 PM by Dr. Nino Jacob MD.       XR Chest 1 View [[id number]] Collected: 09/15/21 1756     Updated: 09/15/21 1759    Narrative:      EXAM:    XR Chest, 1 View     EXAM DATE:    9/15/2021 5:49 PM     CLINICAL HISTORY:    AMS protocol     TECHNIQUE:    Frontal view of the chest.     COMPARISON:    07/09/2021     FINDINGS:    Lungs:  Unremarkable.  No consolidation.    Pleural space:  Unremarkable.  No pneumothorax.    Heart:  Unremarkable.  No cardiomegaly.    Mediastinum:  Unremarkable.    Bones/joints:  Unremarkable.       Impression:        Unremarkable exam. No acute cardiopulmonary findings identified.     This report was finalized on 9/15/2021 5:56 PM by Dr. Nino Jacob MD.           Echo   Results for orders placed during the hospital encounter of 09/15/21    Adult Transthoracic Echo Complete w/ Color, Spectral and Contrast if necessary per protocol    Interpretation Summary  · Normal left ventricular cavity size and wall thickness noted. All left ventricular wall segments contract normally.  · Left ventricular ejection fraction appears to be 61 - 65%.  · Left ventricular diastolic function is consistent with (grade II w/high LAP) pseudonormalization.  · The aortic valve is abnormal in structure. The aortic valve exhibits sclerosis. The aortic valve appears trileaflet. Trace aortic valve regurgitation is present. No aortic valve stenosis is present.  · The mitral valve is structurally normal with no significant stenosis present. Mild mitral valve regurgitation is present.  · There is no evidence of pericardial effusion.     I reviewed the patient's new clinical results.    Telemetry: NSR 70-80 bpm      Medication Review:   aspirin, 81 mg, Oral, Daily  atorvastatin, 10 mg, Oral, Daily  [START ON 9/18/2021] cholecalciferol, 50,000 Units, Oral, Weekly  heparin (porcine), 5,000 Units, Subcutaneous, Q12H  lisinopril, 5 mg, Oral,  Daily  metoprolol succinate XL, 50 mg, Oral, Daily  sodium chloride, 10 mL, Intravenous, Q12H        sodium chloride, 75 mL/hr, Last Rate: 75 mL/hr (09/16/21 1449)        Assessment      Assessment:  1. Short of syncope no clear etiology yet  2. Remote history of [place] seizure in childhood  3. History of COPD        Plan     Recommendations:  1. No arrhythmias on the monitor will recommend outpatient event monitor for assessment  2. Normal left ventricular systolic function per echo, recent stress test negative for ischemia  3. Patient can probably be discharged home today if is okay with medicine service and follow-up with cardiology in 2 to 4 weeks    I discussed the patients findings and my recommendations with patient and family    Electronically signed by MARY Schaffer, 09/17/21, 9:10 AM EDT.  Electronically signed by Samantha Gallagher MD, 09/17/21, 11:45 AM EDT.    Please note that portions of this note were completed with a voice recognition program.

## 2021-09-17 NOTE — PLAN OF CARE
Goal Outcome Evaluation:  Patient being discharged this shift. Patient stated she already had a flu vaccine this year. Patient stated she has no questions at this time. IV and tele removed.

## 2021-09-17 NOTE — DISCHARGE SUMMARY
[facility]    Patient Identification:  Name:  Julieta Holt  Age:  70 y.o.  Sex:  female  :  1950  MRN:  [id number]  Visit Number:  [id number]    Date of Admission: 9/15/2021  Date of Discharge:  2021     PCP: Joan Vasquez, APRN    DISCHARGE DIAGNOSIS  Syncope - Resolved  L posterior parietal scalp hematoma - Stable  HTN  HLD  Chronic HFrEF  Prolonged QTc  Hx Seizures, reportedly resolved  COPD/Emphysema w/o Acute Exacerbation  Anxiety  Depression  SIRS + but have ruled out infection/UTI  Morbid Obesity by BMI    CONSULTS   Cardiology    PROCEDURES PERFORMED  None    HOSPITAL COURSE  70F Morbid Obesity by BMI PMH  seizures,  Hyperlipidemia, HTN, GERD, presented for syncope.    #Syncope c/b L posterior parietal scalp hematoma  Patient presented with syncopal event day of presentation, unwitnessed, was standing walking in to restaurant and felt lightheaded, nauseas, passed out, no bowel bladder incontinence, has had several times in the past, mostly w/ standing, does have hx seizures as child.  Admission labs showed essentially unremarkable other than WBC count 14K.  EKG showed NSR, old LBBB.  B/l Carotid Duplex showed no hemodynamically significant plaques or stenosis.  Echo recently in 2021 showed LVEF 46-50%, diastolic dysfunction, abnormal AV w/ noted sclerosis, trace AR, no stenosis, mild calcified MV w/ mild MR, no stenosis; Repeat echo this admission showed LVEF 61-65%, AV sclerosis but no stenosis and only trace regurg, mild MR. CT Head showed no acute intracranial findings, L posterior parietal scalp hematoma.  Patient received IVF bolus in ER and continued on mIVFs.  Cards consulted and followed, reviewed labs and images and tele and no significant abnormalities noted, rec'd stable for discharge and could f/u outpatient 2-4 weeks, f/u PCP 1 week.  Overall suspect vasovagal syncope and limited chronotropic response on higher doses  of BB.  Have decreased BB dose as per below, decreased antihypertensives, held diuretic therapy and replaced fluids.  Patient asymptomatic today, no further syncope episodes, feels back to baseline and amenable to discharge w/ continued outpatient f/u.     #HTN/HLD  #Chronic HFrEF  #Prolonged QTc  Labs showed trops negative, proBNP 264.  EKG and echo as per above. Cards consulted and followed as per above.  Continued home ASA 81, statin.  Continued home ACEI and home BB but at lower dose.  Held home Imur and home lasix inpatient and at discharge due to normal bps and recent syncope.  F/u PCP 1 week for BP check and volume status check, needs BMP and resume meds as indicated.  F/u cards 2-4 weeks.     #Hx Seizures, reportedly resolved  No home antiepileptic medications, reportedly resolved as a child, MRI negative for structural abnormality, no seizure activity while inpatient.     #COPD/Emphysema w/o Acute Exacerbation  No home regimen, have Rx'd COPD rescue kit upon discharge.    #Anxiety/Depression  Held home Bupropion as can lower seizure threshold, held home duloxetine as can cause seizures, of note bupropion can reportedly increase concentration of duloxetine, thus further increasing risk of seizures; Consulted psych but did not see this AM as patient was gone for test, will not be able to see patient until tomorrow, called and discussed w/ psych and rec'd could discharge for outpatient f/u and could consider starting low dose zoloft or holding until patient could speak with her PCP.  Patient elected to hold meds and f/u w/ PCP to discuss alternatives.     #SIRS + but have ruled out infection/UTI  UA 2/ 13-20 WBC's, no bacteria, 7-12 sq epis, neg nitrites, and asymptomatic; Urine and Bcx's w/o growth, did not require Abx.    #Morbid Obesity by BMI  BMI 40, complicates all aspects of care    VITAL SIGNS:  Temp:  [97.7 °F (36.5 °C)-98.3 °F (36.8 °C)] 98 °F (36.7 °C)  Heart Rate:  [62-77] 65  Resp:  [18-20]  18  BP: (119-152)/(54-72) 119/72  SpO2:  [93 %-96 %] 96 %  on   ;   Device (Oxygen Therapy): room air    Body mass index is 40.08 kg/m².  Wt Readings from Last 3 Encounters:   09/17/21 93.1 kg (205 lb 3.2 oz)   08/18/21 88.9 kg (196 lb)   06/28/21 88.9 kg (196 lb)     PHYSICAL EXAM:  Constitutional:  Well-developed and well-nourished.  No respiratory distress.      HENT:  Head:  Normocephalic and atraumatic.  Mouth:  Moist mucous membranes.    Eyes:  Conjunctivae and EOM are normal.  No scleral icterus.    Neck:  Neck supple.  No JVD present.    Cardiovascular:  Normal rate, regular rhythm and normal heart sounds with no murmur.  Pulmonary/Chest:  No respiratory distress, no wheezes, no crackles, on room air  Abdominal:  Soft. No distension and no tenderness.   Musculoskeletal:  No tenderness, and no deformity.    Neurological:  Alert and oriented to person, place, and time.  No gross focal deficits   Skin:  Skin is warm and dry. No rash noted. No pallor.   Peripheral vascular: no clubbing, no cyanosis, no edema.    DISCHARGE DISPOSITION   Stable    DISCHARGE MEDICATIONS:     Discharge Medications      Changes to Medications      Instructions Start Date   metoprolol succinate XL 50 MG 24 hr tablet  Commonly known as: TOPROL-XL  What changed:   · medication strength  · how much to take   50 mg, Oral, Daily   Start Date: September 18, 2021        Continue These Medications      Instructions Start Date   aspirin 81 MG tablet   81 mg, Oral, Nightly      atorvastatin 10 MG tablet  Commonly known as: LIPITOR   10 mg, Oral, Daily      cholecalciferol 1.25 MG (77476 UT) capsule  Commonly known as: VITAMIN D3   50,000 Units, Oral, Weekly      lisinopril 5 MG tablet  Commonly known as: PRINIVIL,ZESTRIL   5 mg, Oral, Daily      nitroglycerin 0.4 MG SL tablet  Commonly known as: NITROSTAT   0.4 mg, Sublingual, Every 5 Minutes PRN, Take no more than 3 doses in 15 minutes.         Stop These Medications    buPROPion 75 MG  tablet  Commonly known as: WELLBUTRIN     DULoxetine 30 MG capsule  Commonly known as: CYMBALTA     etodolac 300 MG capsule  Commonly known as: LODINE     furosemide 20 MG tablet  Commonly known as: LASIX     isosorbide mononitrate 120 MG 24 hr tablet  Commonly known as: IMDUR            Activity Instructions     Activity as Tolerated          Additional Instructions for the Follow-ups that You Need to Schedule     Discharge Follow-up with PCP   As directed       Currently Documented PCP:    Joan Vasquez, APRN    PCP Phone Number:    [phone]     Follow Up Details: 1 week post hospital f/u for syncope, needs BP check, BMP, holding some home antihypertensives, need to discuss anxiety regimen         Discharge Follow-up with Specified Provider: Cards 2-4 weeks   As directed      To: Cards 2-4 weeks              TEST  RESULTS PENDING AT DISCHARGE  Pending Labs     Order Current Status    Blood Culture - Blood, Arm, Left Preliminary result    Blood Culture - Blood, Hand, Left Preliminary result        CODE STATUS  Code Status and Medical Interventions:   Ordered at: 09/15/21 1945     Level Of Support Discussed With:    Patient     Code Status:    CPR     Medical Interventions (Level of Support Prior to Arrest):    Full     Raphael Garcia MD  [facility] Hospitalist  09/17/21  14:32 EDT    Please note that this discharge summary required more than 30 minutes to complete.

## 2021-09-20 ENCOUNTER — TELEPHONE (OUTPATIENT)
Dept: CARDIOLOGY | Facility: CLINIC | Age: 71
End: 2021-09-20

## 2021-09-20 ENCOUNTER — CLINICAL SUPPORT (OUTPATIENT)
Dept: CARDIOLOGY | Facility: CLINIC | Age: 71
End: 2021-09-20

## 2021-09-20 LAB
BACTERIA SPEC AEROBE CULT: NORMAL
BACTERIA SPEC AEROBE CULT: NORMAL

## 2021-09-20 NOTE — TELEPHONE ENCOUNTER
Called pt to advise them they can come by the office any day in between 8 am and 11 am to have there monitor put on or we can mail it. If they want it mailed please confirm their address. Can not be a PO box.       Pt is coming into office today.

## 2021-10-05 ENCOUNTER — APPOINTMENT (OUTPATIENT)
Dept: GENERAL RADIOLOGY | Facility: HOSPITAL | Age: 71
End: 2021-10-05

## 2021-10-05 ENCOUNTER — HOSPITAL ENCOUNTER (EMERGENCY)
Facility: HOSPITAL | Age: 71
Discharge: HOME | End: 2021-10-05
Attending: STUDENT IN AN ORGANIZED HEALTH CARE EDUCATION/TRAINING PROGRAM | Admitting: STUDENT IN AN ORGANIZED HEALTH CARE EDUCATION/TRAINING PROGRAM

## 2021-10-05 ENCOUNTER — TELEPHONE (OUTPATIENT)
Dept: CARDIOLOGY | Facility: CLINIC | Age: 71
End: 2021-10-05

## 2021-10-05 VITALS
DIASTOLIC BLOOD PRESSURE: 68 MMHG | SYSTOLIC BLOOD PRESSURE: 132 MMHG | WEIGHT: 211 LBS | HEIGHT: 60 IN | HEART RATE: 65 BPM | RESPIRATION RATE: 18 BRPM | BODY MASS INDEX: 41.43 KG/M2 | TEMPERATURE: 98.6 F | OXYGEN SATURATION: 99 %

## 2021-10-05 DIAGNOSIS — R60.0 LOWER EXTREMITY EDEMA: Primary | ICD-10-CM

## 2021-10-05 LAB
ALBUMIN SERPL-MCNC: 4.04 G/DL (ref 3.5–5.2)
ALBUMIN/GLOB SERPL: 1.7 G/DL
ALP SERPL-CCNC: 97 U/L (ref 39–117)
ALT SERPL W P-5'-P-CCNC: 23 U/L (ref 1–33)
ANION GAP SERPL CALCULATED.3IONS-SCNC: 8.6 MMOL/L (ref 5–15)
APTT PPP: 30.6 SECONDS (ref 25.5–35.4)
AST SERPL-CCNC: 13 U/L (ref 1–32)
BASOPHILS # BLD AUTO: 0.04 10*3/MM3 (ref 0–0.2)
BASOPHILS NFR BLD AUTO: 0.3 % (ref 0–1.5)
BILIRUB SERPL-MCNC: 0.3 MG/DL (ref 0–1.2)
BUN SERPL-MCNC: 22 MG/DL (ref 8–23)
BUN/CREAT SERPL: 25.6 (ref 7–25)
CALCIUM SPEC-SCNC: 9.2 MG/DL (ref 8.6–10.5)
CHLORIDE SERPL-SCNC: 106 MMOL/L (ref 98–107)
CO2 SERPL-SCNC: 28.4 MMOL/L (ref 22–29)
CREAT SERPL-MCNC: 0.86 MG/DL (ref 0.57–1)
DEPRECATED RDW RBC AUTO: 46.5 FL (ref 37–54)
EOSINOPHIL # BLD AUTO: 0.03 10*3/MM3 (ref 0–0.4)
EOSINOPHIL NFR BLD AUTO: 0.3 % (ref 0.3–6.2)
ERYTHROCYTE [DISTWIDTH] IN BLOOD BY AUTOMATED COUNT: 13.9 % (ref 12.3–15.4)
GFR SERPL CREATININE-BSD FRML MDRD: 65 ML/MIN/1.73
GLOBULIN UR ELPH-MCNC: 2.4 GM/DL
GLUCOSE SERPL-MCNC: 107 MG/DL (ref 65–99)
HCT VFR BLD AUTO: 39.7 % (ref 34–46.6)
HGB BLD-MCNC: 12.2 G/DL (ref 12–15.9)
IMM GRANULOCYTES # BLD AUTO: 0.26 10*3/MM3 (ref 0–0.05)
IMM GRANULOCYTES NFR BLD AUTO: 2.2 % (ref 0–0.5)
INR PPP: 0.93 (ref 0.9–1.1)
LYMPHOCYTES # BLD AUTO: 1.56 10*3/MM3 (ref 0.7–3.1)
LYMPHOCYTES NFR BLD AUTO: 13.3 % (ref 19.6–45.3)
MCH RBC QN AUTO: 28 PG (ref 26.6–33)
MCHC RBC AUTO-ENTMCNC: 30.7 G/DL (ref 31.5–35.7)
MCV RBC AUTO: 91.3 FL (ref 79–97)
MONOCYTES # BLD AUTO: 0.88 10*3/MM3 (ref 0.1–0.9)
MONOCYTES NFR BLD AUTO: 7.5 % (ref 5–12)
NEUTROPHILS NFR BLD AUTO: 76.4 % (ref 42.7–76)
NEUTROPHILS NFR BLD AUTO: 8.97 10*3/MM3 (ref 1.7–7)
NRBC BLD AUTO-RTO: 0 /100 WBC (ref 0–0.2)
NT-PROBNP SERPL-MCNC: 956.4 PG/ML (ref 0–900)
PLATELET # BLD AUTO: 274 10*3/MM3 (ref 140–450)
PMV BLD AUTO: 10.7 FL (ref 6–12)
POTASSIUM SERPL-SCNC: 4.2 MMOL/L (ref 3.5–5.2)
PROT SERPL-MCNC: 6.4 G/DL (ref 6–8.5)
PROTHROMBIN TIME: 12.8 SECONDS (ref 12.8–14.5)
RBC # BLD AUTO: 4.35 10*6/MM3 (ref 3.77–5.28)
SODIUM SERPL-SCNC: 143 MMOL/L (ref 136–145)
TROPONIN T SERPL-MCNC: <0.01 NG/ML (ref 0–0.03)
TROPONIN T SERPL-MCNC: <0.01 NG/ML (ref 0–0.03)
WBC # BLD AUTO: 11.74 10*3/MM3 (ref 3.4–10.8)

## 2021-10-05 PROCEDURE — 99284 EMERGENCY DEPT VISIT MOD MDM: CPT

## 2021-10-05 PROCEDURE — 25010000002 FUROSEMIDE PER 20 MG: Performed by: NURSE PRACTITIONER

## 2021-10-05 PROCEDURE — 93005 ELECTROCARDIOGRAM TRACING: CPT | Performed by: NURSE PRACTITIONER

## 2021-10-05 PROCEDURE — 71045 X-RAY EXAM CHEST 1 VIEW: CPT | Performed by: RADIOLOGY

## 2021-10-05 PROCEDURE — 93010 ELECTROCARDIOGRAM REPORT: CPT | Performed by: INTERNAL MEDICINE

## 2021-10-05 PROCEDURE — 96372 THER/PROPH/DIAG INJ SC/IM: CPT

## 2021-10-05 PROCEDURE — 84484 ASSAY OF TROPONIN QUANT: CPT | Performed by: NURSE PRACTITIONER

## 2021-10-05 PROCEDURE — 80053 COMPREHEN METABOLIC PANEL: CPT | Performed by: NURSE PRACTITIONER

## 2021-10-05 PROCEDURE — 85730 THROMBOPLASTIN TIME PARTIAL: CPT | Performed by: NURSE PRACTITIONER

## 2021-10-05 PROCEDURE — 85610 PROTHROMBIN TIME: CPT | Performed by: NURSE PRACTITIONER

## 2021-10-05 PROCEDURE — 83880 ASSAY OF NATRIURETIC PEPTIDE: CPT | Performed by: NURSE PRACTITIONER

## 2021-10-05 PROCEDURE — 85025 COMPLETE CBC W/AUTO DIFF WBC: CPT | Performed by: NURSE PRACTITIONER

## 2021-10-05 PROCEDURE — 71045 X-RAY EXAM CHEST 1 VIEW: CPT

## 2021-10-05 RX ORDER — FUROSEMIDE 40 MG/1
40 TABLET ORAL DAILY
Qty: 7 TABLET | Refills: 0 | Status: SHIPPED | OUTPATIENT
Start: 2021-10-05 | End: 2022-07-12

## 2021-10-05 RX ORDER — FUROSEMIDE 10 MG/ML
40 INJECTION INTRAMUSCULAR; INTRAVENOUS ONCE
Status: COMPLETED | OUTPATIENT
Start: 2021-10-05 | End: 2021-10-05

## 2021-10-05 RX ADMIN — FUROSEMIDE 40 MG: 10 INJECTION INTRAMUSCULAR; INTRAVENOUS at 13:43
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
## 2021-10-05 NOTE — TELEPHONE ENCOUNTER
Pt called and stated she gained 11 lbs over night. I advised her to go to the ER. She expressed understanding.

## 2021-10-05 NOTE — DISCHARGE INSTRUCTIONS
Follow up with your primary care provider in 2-3 days.      Return to the emergency room for worsening symptoms.

## 2021-10-06 ENCOUNTER — PATIENT OUTREACH (OUTPATIENT)
Dept: CASE MANAGEMENT | Facility: OTHER | Age: 71
End: 2021-10-06

## 2021-10-06 LAB
QT INTERVAL: 480 MS
QTC INTERVAL: 471 MS

## 2021-10-06 NOTE — OUTREACH NOTE
Ambulatory Case Management Note    RN-ACM outreach to patient.  Patient had an ED visit at [facility] 10/05/21.  Clinical impression noted as lower extremity edema.  Patient reported an 11lb weight gain and was directed to ED by cardiology.  Patient was treated and discharged to home to follow with PCP.      Medication Changes       Furosemide 40 mg Oral Daily    Care Evaluation    Questions/Answers      Most Recent Value   Suggested Appointments  -- [Follow with PCP as recommended.  Patient reported having an appointment scheduled for next week. ]   Care Gaps Addressed  Colon Cancer Screening, Mammogram, Flu Shot   Colon Cancer Screening Type  Colonoscopy   Flu Shot Status  Up to Date   Mammogram Status  Up to Date   Other Patient Education/Resources   [facility] Nurse Call Line [AVS, education, discharge medications, and recommended f/u reviewed. 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    Silvia Gage RN  Ambulatory Case Management    10/6/2021, 12:03 EDT

## 2021-10-25 ENCOUNTER — TREATMENT (OUTPATIENT)
Dept: CARDIOLOGY | Facility: CLINIC | Age: 71
End: 2021-10-25

## 2021-10-25 DIAGNOSIS — R55 VASOVAGAL SYNCOPE: ICD-10-CM

## 2021-11-04 PROCEDURE — 93228 REMOTE 30 DAY ECG REV/REPORT: CPT | Performed by: SPECIALIST

## 2021-11-15 ENCOUNTER — OFFICE VISIT (OUTPATIENT)
Dept: CARDIOLOGY | Facility: CLINIC | Age: 71
End: 2021-11-15

## 2021-11-15 VITALS
RESPIRATION RATE: 18 BRPM | HEART RATE: 117 BPM | BODY MASS INDEX: 38.79 KG/M2 | OXYGEN SATURATION: 97 % | WEIGHT: 197.6 LBS | DIASTOLIC BLOOD PRESSURE: 70 MMHG | HEIGHT: 60 IN | SYSTOLIC BLOOD PRESSURE: 129 MMHG | TEMPERATURE: 97.1 F

## 2021-11-15 DIAGNOSIS — R00.2 PALPITATIONS: Primary | ICD-10-CM

## 2021-11-15 DIAGNOSIS — E78.5 DYSLIPIDEMIA: ICD-10-CM

## 2021-11-15 PROCEDURE — 99213 OFFICE O/P EST LOW 20 MIN: CPT | Performed by: PHYSICIAN ASSISTANT

## 2021-11-15 RX ORDER — LISINOPRIL 10 MG/1
10 TABLET ORAL 2 TIMES DAILY
COMMUNITY
End: 2022-05-18 | Stop reason: SDUPTHER

## 2021-11-15 RX ORDER — BUSPIRONE HYDROCHLORIDE 10 MG/1
10-20 TABLET ORAL 2 TIMES DAILY
COMMUNITY

## 2022-01-10 ENCOUNTER — LAB (OUTPATIENT)
Dept: LAB | Facility: HOSPITAL | Age: 72
End: 2022-01-10

## 2022-01-10 DIAGNOSIS — R00.2 PALPITATIONS: ICD-10-CM

## 2022-01-10 PROCEDURE — 84443 ASSAY THYROID STIM HORMONE: CPT

## 2022-01-10 PROCEDURE — 36415 COLL VENOUS BLD VENIPUNCTURE: CPT

## 2022-01-11 LAB — TSH SERPL DL<=0.05 MIU/L-ACNC: 1.57 UIU/ML (ref 0.27–4.2)

## 2022-02-10 ENCOUNTER — OFFICE VISIT (OUTPATIENT)
Dept: ORTHOPEDIC SURGERY | Facility: CLINIC | Age: 72
End: 2022-02-10

## 2022-02-10 VITALS
WEIGHT: 193.4 LBS | SYSTOLIC BLOOD PRESSURE: 139 MMHG | DIASTOLIC BLOOD PRESSURE: 88 MMHG | HEIGHT: 60 IN | BODY MASS INDEX: 37.97 KG/M2

## 2022-02-10 DIAGNOSIS — M25.562 CHRONIC PAIN OF LEFT KNEE: Primary | ICD-10-CM

## 2022-02-10 DIAGNOSIS — M17.12 PRIMARY OSTEOARTHRITIS OF LEFT KNEE: ICD-10-CM

## 2022-02-10 DIAGNOSIS — G89.29 CHRONIC PAIN OF LEFT KNEE: Primary | ICD-10-CM

## 2022-02-10 DIAGNOSIS — Z87.81 HISTORY OF TIBIAL FRACTURE: ICD-10-CM

## 2022-02-10 PROCEDURE — 99214 OFFICE O/P EST MOD 30 MIN: CPT | Performed by: PHYSICIAN ASSISTANT

## 2022-02-10 PROCEDURE — 20610 DRAIN/INJ JOINT/BURSA W/O US: CPT | Performed by: PHYSICIAN ASSISTANT
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
## 2022-02-10 NOTE — PROGRESS NOTES
Procedure   Large Joint Arthrocentesis: L knee  Date/Time: 2/10/2022 10:35 AM  Consent given by: patient  Site marked: site marked  Timeout: Immediately prior to procedure a time out was called to verify the correct patient, procedure, equipment, support staff and site/side marked as required   Supporting Documentation  Indications: pain   Procedure Details  Location: knee - L knee  Preparation: Patient was prepped and draped in the usual sterile fashion  Needle size: 22 G  Approach: anterolateral  Medications administered: 30 mg Hyaluronan 30 MG/2ML  Patient tolerance: patient tolerated the procedure well with no immediate complications

## 2022-02-14 ENCOUNTER — TELEPHONE (OUTPATIENT)
Dept: ORTHOPEDIC SURGERY | Facility: CLINIC | Age: 72
End: 2022-02-14

## 2022-02-14 NOTE — TELEPHONE ENCOUNTER
Patient called stating that she is suppose to have orders sent to a Imaging place in [place] fax number is [phone] for an MRI for her Tiba,

## 2022-02-18 ENCOUNTER — CLINICAL SUPPORT (OUTPATIENT)
Dept: ORTHOPEDIC SURGERY | Facility: CLINIC | Age: 72
End: 2022-02-18

## 2022-02-18 DIAGNOSIS — M17.12 PRIMARY OSTEOARTHRITIS OF LEFT KNEE: Primary | ICD-10-CM

## 2022-02-18 PROCEDURE — 20610 DRAIN/INJ JOINT/BURSA W/O US: CPT | Performed by: PHYSICIAN ASSISTANT

## 2022-02-18 RX ORDER — ALBUTEROL SULFATE 90 UG/1
1 AEROSOL, METERED RESPIRATORY (INHALATION) EVERY 6 HOURS PRN
COMMUNITY
Start: 2022-02-14

## 2022-02-18 NOTE — PROGRESS NOTES
CC: Follow-up left knee osteoarthritis, 2/3 Orthovisc injection today    History of present illness: Patient presents for her second Orthovisc injection to the left knee today.  At this time she denies any numbness or tingling into the distal extremity.  No fever, chills, night sweats or other constitutional symptoms.    Patient has not noted any changes since receiving the first injection last week.  Pain level remains the same.    The MRI that was ordered at her last appointment will be done on first week in March.  Patient wanted set up a follow-up appointment on a Tuesday or Thursday after the MRI is completed.    See chart for PMH, PSH, Meds, All - reviewed.    Ortho exam:  Left knee  Skin is intact without redness, warmth or swelling/effusion.  No lesions or evidence of infection noted.  Motor/sensory: Grossly intact L2-S1.    Assessment/plan:  Left knee osteoarthritis    Proceed today with 2/3 of Orthovisc injection.  Patient will follow-up in week for third injection.      After discussing risks of injection the patient gave consent to proceed.  Her left knee was confirmed as the correct joint to be injected with a timeout.  The knee was then prepped with Hibiclens and injected with a prefilled syringe of Orthovisc without any resistance using anterior lateral approach, patient in seated position.  The patient tolerated procedure well.  Hemostasis was achieved and a Band-Aid was applied over the injection site.  I instructed the patient on signs and symptoms of infection.  They should report to the ED if any of these develop.  Recommended modifying activity to include rest, ice, elevation and/or heat along with oral pain medication as needed.  Patient observed ambulating normally after the injection.

## 2022-02-18 NOTE — PROGRESS NOTES
Procedure   Large Joint Arthrocentesis: L knee  Date/Time: 2/18/2022 9:30 AM  Consent given by: patient  Site marked: site marked  Timeout: Immediately prior to procedure a time out was called to verify the correct patient, procedure, equipment, support staff and site/side marked as required   Supporting Documentation  Indications: pain   Procedure Details  Location: knee - L knee  Preparation: Patient was prepped and draped in the usual sterile fashion  Needle size: 22 G  Approach: anterolateral  Medications administered: 30 mg Hyaluronan 30 MG/2ML  Patient tolerance: patient tolerated the procedure well with no immediate complications

## 2022-02-23 ENCOUNTER — TRANSCRIBE ORDERS (OUTPATIENT)
Dept: ADMINISTRATIVE | Facility: HOSPITAL | Age: 72
End: 2022-02-23

## 2022-02-23 DIAGNOSIS — Z87.891 PERSONAL HISTORY OF TOBACCO USE, PRESENTING HAZARDS TO HEALTH: Primary | ICD-10-CM

## 2022-02-25 ENCOUNTER — CLINICAL SUPPORT (OUTPATIENT)
Dept: ORTHOPEDIC SURGERY | Facility: CLINIC | Age: 72
End: 2022-02-25

## 2022-02-25 DIAGNOSIS — M17.12 PRIMARY OSTEOARTHRITIS OF LEFT KNEE: Primary | ICD-10-CM

## 2022-02-25 PROCEDURE — 20610 DRAIN/INJ JOINT/BURSA W/O US: CPT | Performed by: PHYSICIAN ASSISTANT

## 2022-02-25 NOTE — PROGRESS NOTES
Procedure   Large Joint Arthrocentesis: L knee  Date/Time: 2/25/2022 9:18 AM  Consent given by: patient  Site marked: site marked  Timeout: Immediately prior to procedure a time out was called to verify the correct patient, procedure, equipment, support staff and site/side marked as required   Supporting Documentation  Indications: pain   Procedure Details  Location: knee - L knee  Preparation: Patient was prepped and draped in the usual sterile fashion  Needle size: 22 G  Approach: anterolateral  Medications administered: 30 mg Hyaluronan 30 MG/2ML  Patient tolerance: patient tolerated the procedure well with no immediate complications

## 2022-02-25 NOTE — PROGRESS NOTES
CC: Follow-up left knee osteoarthritis, 3/3 Orthovisc injection today     History of present illness: Patient presents for her third Orthovisc injection to the left knee today.  At this time she denies any numbness or tingling into the distal extremity.  No fever, chills, night sweats or other constitutional symptoms.     Patient states that she is feeling some improvement following the second injection.     Her MRI has been scheduled and she has a follow-up with us on 3/15/2022 to review it.     See chart for PMH, PSH, Meds, All - reviewed.     Ortho exam:  Left knee  Skin is intact without redness, warmth or swelling/effusion.  No lesions or evidence of infection noted.  Motor/sensory: Grossly intact L2-S1.     Assessment/plan:  Left knee osteoarthritis     Proceed today with 3/3 of Orthovisc injection.  Patient will follow-up on 3/15/2022 to review the MRI.       After discussing risks of injection the patient gave consent to proceed.  Her left knee was confirmed as the correct joint to be injected with a timeout.  The knee was then prepped with Hibiclens and injected with a prefilled syringe of Orthovisc without any resistance using anterior lateral approach, patient in seated position.  The patient tolerated procedure well.  Hemostasis was achieved and a Band-Aid was applied over the injection site.  I instructed the patient on signs and symptoms of infection.  They should report to the ED if any of these develop.  Recommended modifying activity to include rest, ice, elevation and/or heat along with oral pain medication as needed.  Patient observed ambulating normally after the injection.

## 2022-03-03 ENCOUNTER — HOSPITAL ENCOUNTER (OUTPATIENT)
Dept: MRI IMAGING | Facility: HOSPITAL | Age: 72
Discharge: HOME | End: 2022-03-03
Admitting: PHYSICIAN ASSISTANT

## 2022-03-03 DIAGNOSIS — Z87.81 HISTORY OF TIBIAL FRACTURE: ICD-10-CM

## 2022-03-03 PROCEDURE — 73718 MRI LOWER EXTREMITY W/O DYE: CPT | Performed by: RADIOLOGY

## 2022-03-03 PROCEDURE — 73718 MRI LOWER EXTREMITY W/O DYE: CPT

## 2022-03-15 ENCOUNTER — OFFICE VISIT (OUTPATIENT)
Dept: ORTHOPEDIC SURGERY | Facility: CLINIC | Age: 72
End: 2022-03-15

## 2022-03-15 VITALS
HEIGHT: 60 IN | WEIGHT: 191 LBS | BODY MASS INDEX: 37.5 KG/M2 | DIASTOLIC BLOOD PRESSURE: 79 MMHG | SYSTOLIC BLOOD PRESSURE: 120 MMHG

## 2022-03-15 DIAGNOSIS — M25.562 CHRONIC PAIN OF LEFT KNEE: ICD-10-CM

## 2022-03-15 DIAGNOSIS — Z87.81 HISTORY OF TIBIAL FRACTURE: ICD-10-CM

## 2022-03-15 DIAGNOSIS — M17.12 PRIMARY OSTEOARTHRITIS OF LEFT KNEE: Primary | ICD-10-CM

## 2022-03-15 DIAGNOSIS — E66.09 CLASS 2 OBESITY DUE TO EXCESS CALORIES WITHOUT SERIOUS COMORBIDITY WITH BODY MASS INDEX (BMI) OF 37.0 TO 37.9 IN ADULT: ICD-10-CM

## 2022-03-15 DIAGNOSIS — G89.29 CHRONIC PAIN OF LEFT KNEE: ICD-10-CM

## 2022-03-15 PROCEDURE — 99213 OFFICE O/P EST LOW 20 MIN: CPT | Performed by: PHYSICIAN ASSISTANT

## 2022-03-15 RX ORDER — FAMOTIDINE 40 MG/1
40 TABLET, FILM COATED ORAL NIGHTLY
COMMUNITY
Start: 2022-02-23

## 2022-03-15 RX ORDER — ALENDRONATE SODIUM 70 MG/1
70 TABLET ORAL
COMMUNITY
Start: 2022-02-23

## 2022-03-15 NOTE — PROGRESS NOTES
"    [facility] Note        Subjective     CC: Follow-up (Left Knee Pain, completed visco 2/25/22, after MRI Tibia 3/3/22/)      LATOYA Holt is a 71 y.o. female.  Patient returns today for MRI follow-up of her left tib-fib.  She has a history of proximal tibia fracture that was open over 30 years ago.  At that time was fixed with exfix of bone and skin grafting.  MRI was obtained to assess bone and soft tissue due to patient wanting to undergo TKA in future.    She also just completed viscosupplementation series to the left knee on 2/25/2022.  She states no change in symptoms with regards to these injections.  Patient has also failed prior corticosteroid injections.  This is all in the setting of prior knee arthroscopy with partial meniscectomy 3/4/2021 with Dr. Velasquez.    Current pain scale 6/10.  Describes the pain as stabbing, throbbing, burning.  Associated symptoms popping, grinding and swelling.  Pain worse with walking, standing, stair climbing and movement of the joint.  No reported numbness or tingling into the extremity.    Overall, patient's symptoms are unchanged since initial evaluation in February at our clinic.    ROS:    Constiutional:Pt denies fever, chills, nausea, or vomiting.  MSK:as above        Objective      Past Medical History  Past Medical History:   Diagnosis Date   • Arthritis    • Cardiac arrhythmia due to congenital heart disease    • Colitis    • COPD (chronic obstructive pulmonary disease) ([facility])    • Depression    • Heart murmur    • High blood pressure    • High cholesterol    • History of transfusion    • Osteoarthritis    • Osteoporosis    • PONV (postoperative nausea and vomiting)    • Seizure disorder ([facility])    • Seizures ([facility])     last seizure 1960   • Sinusitis    • Urinary tract infection          Physical Exam  /79   Ht 152.4 cm (60\")   Wt 86.6 kg (191 lb)   BMI 37.30 kg/m²     Body mass index is 37.3 kg/m².    Patient is well " nourished and well developed.        Ortho Exam  Left knee  Skin: Grossly intact without any redness or warmth.  Trace effusion noted.  Surgical incision sites from previous left lower leg surgery are well-healed.  Motion: 0-125 degrees with crepitus and pain on motion.  Tenderness: Primarily medial joint line but patient also notes jung-/retropatellar tenderness.  Testing: Varus and valgus stress negative.  Lachman negative.  Musa's: Positive along medial joint line but no catch or click.  Patella: Compression/grind positive.  Motor/sensory: Grossly intact L2-S1.      Imaging/Labs/EMG Reviewed:  Dr. Murillo and I reviewed MRI performed on 3/3/2022.    EXAMINATION: MRI TIBIA FIBULA LEFT WO CONTRAST-      CLINICAL INDICATION: History of fracture to the tib-fib requiring exfix,  bone graft and skin grafting; Z87.81-Personal history of (healed)  traumatic fracture        COMPARISON: None immediately available.     PROCEDURE: Multiple planar images of the left tibia and fibula were  acquired in a 1.5 Candy magnet. Several pulse sequences were used to  acquire the study. Gadolinium was not administered.     FINDINGS:  Study is degraded due to patient motion despite attempts to obtain  better still images.     FINDINGS:  Small defect in the anterior soft tissues but no abnormal soft tissue  mass is seen,     Bony structures are intact. Patient does have a history of bone graft.     No fracture or destructive bony abnormality is seen.     IMPRESSION:  Evidence of previous tibial graft but no destructive bony  lesion or adjacent soft tissue mass.      This report was finalized on 3/4/2022 8:00 AM by Dr. Jaden Rajput MD.      Assessment:  1. Primary osteoarthritis of left knee    2. Chronic pain of left knee    3. History of tibial fracture    4. Class 2 obesity due to excess calories without serious comorbidity with body mass index (BMI) of 37.0 to 37.9 in adult        Plan:  1. Left knee osteoarthritis causing chronic  pain--patient has failed conservative nonoperative management.  2. History of left proximal tib-fib fracture--healed and stable per MRI.  3. Discussed risk, benefits and indications of proceeding with TKA.  She would like to discuss with the surgeon.  4. She understands she cannot undergo TKA until she is a minimum of 3 months out from her last injection which was 2/25/2022.  5. She will continue with over-the-counter pain medication as needed.  6. Patient does not have diabetes with a last reported A1c of 5.6 on 9/16/2021.  7. Patient does not smoke.  8. Obesity--patient has a BMI of 37.3.  The patient has been instructed on various weight loss avenues including diet, portion control, calorie restriction, low/no impact exercise.  It was explained that weight loss can improve joint pain alone by decreasing the joint reaction forces.  For every pound of weight change, the knee and hip joints see a 4 to 5 fold change in pressure.  Patient understands that in order to proceed with TKA she needs to keep her BMI under 40.  She will work on the above listed diet and exercise measures.  9. Follow-up with Dr. Murillo in 2 months.  10. Questions and concerns answered.      Christy Krueger PA-C  03/21/22  08:25 EDT      Dictated Utilizing Dragon Dictation.

## 2022-04-04 ENCOUNTER — APPOINTMENT (OUTPATIENT)
Dept: MAMMOGRAPHY | Facility: HOSPITAL | Age: 72
End: 2022-04-04

## 2022-04-29 ENCOUNTER — HOSPITAL ENCOUNTER (OUTPATIENT)
Dept: MAMMOGRAPHY | Facility: HOSPITAL | Age: 72
Discharge: HOME | End: 2022-04-29
Admitting: NURSE PRACTITIONER

## 2022-04-29 DIAGNOSIS — Z12.31 VISIT FOR SCREENING MAMMOGRAM: ICD-10-CM

## 2022-04-29 PROCEDURE — 77067 SCR MAMMO BI INCL CAD: CPT | Performed by: RADIOLOGY

## 2022-04-29 PROCEDURE — 77063 BREAST TOMOSYNTHESIS BI: CPT | Performed by: RADIOLOGY

## 2022-04-29 PROCEDURE — 77063 BREAST TOMOSYNTHESIS BI: CPT

## 2022-04-29 PROCEDURE — 77067 SCR MAMMO BI INCL CAD: CPT

## 2022-05-12 ENCOUNTER — OFFICE VISIT (OUTPATIENT)
Dept: ORTHOPEDIC SURGERY | Facility: CLINIC | Age: 72
End: 2022-05-12

## 2022-05-12 VITALS
HEIGHT: 60 IN | DIASTOLIC BLOOD PRESSURE: 78 MMHG | BODY MASS INDEX: 38.52 KG/M2 | WEIGHT: 196.2 LBS | SYSTOLIC BLOOD PRESSURE: 134 MMHG

## 2022-05-12 DIAGNOSIS — M17.12 PRIMARY OSTEOARTHRITIS OF LEFT KNEE: Primary | ICD-10-CM

## 2022-05-12 PROCEDURE — 99214 OFFICE O/P EST MOD 30 MIN: CPT | Performed by: ORTHOPAEDIC SURGERY

## 2022-05-12 RX ORDER — MELOXICAM 7.5 MG/1
15 TABLET ORAL ONCE
Status: CANCELLED | OUTPATIENT
Start: 2022-05-12 | End: 2022-05-12

## 2022-05-12 RX ORDER — PREGABALIN 75 MG/1
75 CAPSULE ORAL ONCE
Status: CANCELLED | OUTPATIENT
Start: 2022-05-12 | End: 2022-05-12

## 2022-05-12 RX ORDER — CHLORHEXIDINE GLUCONATE 4 G/100ML
SOLUTION TOPICAL DAILY PRN
Qty: 237 ML | Refills: 0 | Status: ON HOLD | OUTPATIENT
Start: 2022-05-12 | End: 2022-06-22

## 2022-05-12 RX ORDER — ACETAMINOPHEN 325 MG/1
1000 TABLET ORAL ONCE
Status: CANCELLED | OUTPATIENT
Start: 2022-05-12 | End: 2022-05-12
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
## 2022-05-18 ENCOUNTER — HOSPITAL ENCOUNTER (OUTPATIENT)
Dept: ULTRASOUND IMAGING | Facility: HOSPITAL | Age: 72
Discharge: HOME | End: 2022-05-18

## 2022-05-18 ENCOUNTER — HOSPITAL ENCOUNTER (OUTPATIENT)
Dept: MAMMOGRAPHY | Facility: HOSPITAL | Age: 72
Discharge: HOME | End: 2022-05-18

## 2022-05-18 ENCOUNTER — OFFICE VISIT (OUTPATIENT)
Dept: CARDIOLOGY | Facility: CLINIC | Age: 72
End: 2022-05-18

## 2022-05-18 VITALS
OXYGEN SATURATION: 96 % | TEMPERATURE: 97.3 F | HEIGHT: 60 IN | BODY MASS INDEX: 39.58 KG/M2 | WEIGHT: 201.6 LBS | HEART RATE: 96 BPM | DIASTOLIC BLOOD PRESSURE: 75 MMHG | SYSTOLIC BLOOD PRESSURE: 123 MMHG

## 2022-05-18 DIAGNOSIS — E78.5 DYSLIPIDEMIA: ICD-10-CM

## 2022-05-18 DIAGNOSIS — R92.8 ABNORMAL MAMMOGRAM OF LEFT BREAST: ICD-10-CM

## 2022-05-18 DIAGNOSIS — R00.2 PALPITATIONS: Primary | ICD-10-CM

## 2022-05-18 DIAGNOSIS — R07.2 PRECORDIAL PAIN: ICD-10-CM

## 2022-05-18 PROCEDURE — 76642 ULTRASOUND BREAST LIMITED: CPT

## 2022-05-18 PROCEDURE — 77065 DX MAMMO INCL CAD UNI: CPT | Performed by: RADIOLOGY

## 2022-05-18 PROCEDURE — G0279 TOMOSYNTHESIS, MAMMO: HCPCS

## 2022-05-18 PROCEDURE — 99213 OFFICE O/P EST LOW 20 MIN: CPT | Performed by: PHYSICIAN ASSISTANT

## 2022-05-18 PROCEDURE — 77065 DX MAMMO INCL CAD UNI: CPT

## 2022-05-18 PROCEDURE — G0279 TOMOSYNTHESIS, MAMMO: HCPCS | Performed by: RADIOLOGY

## 2022-05-18 PROCEDURE — 76642 ULTRASOUND BREAST LIMITED: CPT | Performed by: RADIOLOGY

## 2022-05-18 RX ORDER — ATORVASTATIN CALCIUM 10 MG/1
10 TABLET, FILM COATED ORAL DAILY
Qty: 90 TABLET | Refills: 2 | Status: SHIPPED | OUTPATIENT
Start: 2022-05-18

## 2022-05-18 RX ORDER — LISINOPRIL 10 MG/1
10 TABLET ORAL 2 TIMES DAILY
Qty: 90 TABLET | Refills: 3 | Status: SHIPPED | OUTPATIENT
Start: 2022-05-18

## 2022-05-18 NOTE — PROGRESS NOTES
Joan Vasquez, APRN  Julieta Holt  1950  05/18/2022    Patient Active Problem List   Diagnosis   • Palpitations   • Precordial pain   • Dyslipidemia   • SOB (shortness of breath)   • Trochanteric bursitis of left hip   • Primary osteoarthritis of left knee   • Acute medial meniscus tear of left knee   • S/P arthroscopic partial medial meniscectomy   • Knee strain, left, initial encounter   • Hip pain, bilateral   • Syncope       Dear Joan Vasquez, APRN:    Subjective     History of Present Illness:    Chief Complaint   Patient presents with   • Med managment     List   • Surgical Clearance       Julieta Holt is a pleasant 71 y.o. female with a past medical history significant for chronic HFrEF with recovered LVEF.  She has no known coronary artery disease but does have prediabetes, history of tobacco abuse but did quit in 2004 with a roughly 30-pack-year history.  She also has history of essential hypertension and dyslipidemia.  She comes in today for hospital follow-up.     Julieta reports that she does have rare occasional chest pains that she describes as a pressure in the center of her chest that lasted 2 to 3 minutes.  However she reports in the last 6 months she has only had 2 episodes.  She does have some chronic dyspnea but admits that this is chronic and has not changed recently.  She also denies any syncope or near syncope.    Allergies   Allergen Reactions   • Erythromycin        bruise   :      Current Outpatient Medications:   •  albuterol sulfate  (90 Base) MCG/ACT inhaler, , Disp: , Rfl:   •  aspirin 81 MG tablet, Take 81 mg by mouth Every Night., Disp: 30 tablet, Rfl: 11  •  atorvastatin (LIPITOR) 10 MG tablet, Take 1 tablet by mouth Daily., Disp: 90 tablet, Rfl: 2  •  busPIRone (BUSPAR) 10 MG tablet, Take 10 mg by mouth 2 (Two) Times a Day., Disp: , Rfl:   •  cholecalciferol (VITAMIN D3) 1.25 MG (23927 UT) capsule, Take 50,000 Units by mouth 1 (One) Time  Per Week., Disp: , Rfl:   •  famotidine (PEPCID) 40 MG tablet, TAKE 1 TABLET BY MOUTH EVERY NIGHT AT BEDTIME FOR STOMACH, Disp: , Rfl:   •  ipratropium-albuterol (DUO-NEB) 0.5-2.5 mg/3 ml nebulizer, Take 3 mL by neb every 30 minutes as needed for shortness of air for up to 6 doses. Part of COPD Rescue Kit. (Only Start if in YELLOW ZONE.) (Patient taking differently: Take 3 mL by neb every 30 minutes as needed for shortness of air for up to 6 doses. Part of COPD Rescue Kit. (Only Start if in YELLOW ZONE.)--PATIENT REPORTS THAT SHE HAS NOT HAD TO USE THIS), Disp: 18 mL, Rfl: 0  •  lisinopril (PRINIVIL,ZESTRIL) 10 MG tablet, Take 1 tablet by mouth 2 (Two) Times a Day., Disp: 90 tablet, Rfl: 3  •  metFORMIN (GLUCOPHAGE) 500 MG tablet, Take 500 mg by mouth 2 (Two) Times a Day With Meals., Disp: , Rfl:   •  metoprolol succinate XL (TOPROL-XL) 50 MG 24 hr tablet, Take 1 tablet by mouth Daily., Disp: 30 tablet, Rfl: 0  •  nitroglycerin (NITROSTAT) 0.4 MG SL tablet, Place 1 tablet under the tongue Every 5 (Five) Minutes As Needed for Chest Pain. Take no more than 3 doses in 15 minutes., Disp: 25 tablet, Rfl: 0  •  alendronate (FOSAMAX) 70 MG tablet, TAKE 1 TABLET PER WEEK WITH A FULL GLASS OF WATER, SIT UPRIGHT FOR 30 MIN AFTER TAKING, Disp: , Rfl:   •  chlorhexidine (HIBICLENS) 4 % external liquid, Shower daily with hibiclens solution as directed 5 days prior to surgery., Disp: 237 mL, Rfl: 0  •  doxycycline (MONODOX) 100 MG capsule, Take 1 capsule by mouth every 12 hours for 7 days as part of COPD Rescue Kit. (Only Start if in YELLOW ZONE.) (Patient taking differently: Take 1 capsule by mouth every 12 hours for 7 days as part of COPD Rescue Kit. (Only Start if in YELLOW ZONE.)--PATIENT REPORTS THAT SHE HAS NOT HAD TO USE THIS), Disp: 14 capsule, Rfl: 0  •  furosemide (LASIX) 40 MG tablet, Take 1 tablet by mouth Daily for 7 days., Disp: 7 tablet, Rfl: 0  •  mupirocin (BACTROBAN) 2 % ointment, Apply pea-sized amount to each  "nostril twice daily for 5 days prior to surgery, Disp: 22 g, Rfl: 0    The following portions of the patient's history were reviewed and updated as appropriate: allergies, current medications, past family history, past medical history, past social history, past surgical history and problem list.    Social History     Tobacco Use   • Smoking status: Former Smoker     Packs/day: 1.50     Types: Cigarettes     Quit date:      Years since quittin.4   • Smokeless tobacco: Never Used   • Tobacco comment: quit    Vaping Use   • Vaping Use: Never used   Substance Use Topics   • Alcohol use: Yes     Comment: ocassionally   • Drug use: No         Objective   Vitals:    22 1505   BP: 123/75   BP Location: Left arm   Patient Position: Sitting   Cuff Size: Adult   Pulse: 96   Temp: 97.3 °F (36.3 °C)   SpO2: 96%   Weight: 91.4 kg (201 lb 9.6 oz)   Height: 152.4 cm (60\")     Body mass index is 39.37 kg/m².    Constitutional:       General: Not in acute distress.     Appearance: Healthy appearance. Well-developed and not in distress. Not diaphoretic.   Eyes:      Conjunctiva/sclera: Conjunctivae normal.      Pupils: Pupils are equal, round, and reactive to light.   HENT:      Head: Normocephalic and atraumatic.   Neck:      Vascular: No carotid bruit or JVD.   Pulmonary:      Effort: Pulmonary effort is normal. No respiratory distress.      Breath sounds: Normal breath sounds.   Cardiovascular:      Normal rate. Regular rhythm.   Skin:     General: Skin is cool.   Neurological:      Mental Status: Alert, oriented to person, place, and time and oriented to person, place and time.         Lab Results   Component Value Date     2022    K 4.6 2022     2022    CO2 29.0 2022    BUN 18 2022    CREATININE 0.89 2022    GLUCOSE 105 (H) 2022    CALCIUM 9.5 2022    AST 13 10/05/2021    ALT 23 10/05/2021    ALKPHOS 97 10/05/2021     No results found for: CKTOTAL  Lab " Results   Component Value Date    WBC 6.78 05/20/2022    HGB 12.6 05/20/2022    HCT 39.2 05/20/2022     05/20/2022     Lab Results   Component Value Date    INR 0.99 05/20/2022    INR 0.93 10/05/2021    INR 0.96 11/17/2017     Lab Results   Component Value Date    MG 2.0 09/15/2021     Lab Results   Component Value Date    TSH 1.570 01/10/2022    TRIG 90 09/16/2021    HDL 50 09/16/2021    LDL 67 09/16/2021      No results found for: BNP    During this visit the following were done:  Labs Reviewed []    Labs Ordered []    Radiology Reports Reviewed []    Radiology Ordered []    PCP Records Reviewed []    Referring Provider Records Reviewed []    ER Records Reviewed []    Hospital Records Reviewed []    History Obtained From Family []    Radiology Images Reviewed []    Other Reviewed []    Records Requested []       Procedures    Assessment & Plan    Diagnosis Plan   1. Palpitations     2. Dyslipidemia     3. Precordial pain  Stress Test With Myocardial Perfusion One Day             Recommendations:  1. Surgical risk evaluation for knee surgery  1. Discussed the case with Dr. Abbasi given patient's symptoms do have some typical features of angina we do think stress testing is appropriate prior to clearing for surgery.  If stress test comes back unremarkable can clear patient from cardiac standpoint.      Return in about 3 months (around 8/18/2022).    As always, I appreciate very much the opportunity to participate in the cardiovascular care of your patients.      With Best Regards,    Maxime Scott PA-C

## 2022-05-20 ENCOUNTER — PRE-ADMISSION TESTING (OUTPATIENT)
Dept: PREADMISSION TESTING | Facility: HOSPITAL | Age: 72
End: 2022-05-20

## 2022-05-20 VITALS — BODY MASS INDEX: 38.11 KG/M2 | WEIGHT: 201.83 LBS | HEIGHT: 61 IN

## 2022-05-20 DIAGNOSIS — M17.12 PRIMARY OSTEOARTHRITIS OF LEFT KNEE: ICD-10-CM

## 2022-05-20 LAB
ANION GAP SERPL CALCULATED.3IONS-SCNC: 8 MMOL/L (ref 5–15)
APTT PPP: 37.6 SECONDS (ref 22–39)
BASOPHILS # BLD AUTO: 0.04 10*3/MM3 (ref 0–0.2)
BASOPHILS NFR BLD AUTO: 0.6 % (ref 0–1.5)
BUN SERPL-MCNC: 18 MG/DL (ref 8–23)
BUN/CREAT SERPL: 20.2 (ref 7–25)
CALCIUM SPEC-SCNC: 9.5 MG/DL (ref 8.6–10.5)
CHLORIDE SERPL-SCNC: 106 MMOL/L (ref 98–107)
CO2 SERPL-SCNC: 29 MMOL/L (ref 22–29)
CREAT SERPL-MCNC: 0.89 MG/DL (ref 0.57–1)
DEPRECATED RDW RBC AUTO: 43.7 FL (ref 37–54)
EGFRCR SERPLBLD CKD-EPI 2021: 69 ML/MIN/1.73
EOSINOPHIL # BLD AUTO: 0.25 10*3/MM3 (ref 0–0.4)
EOSINOPHIL NFR BLD AUTO: 3.7 % (ref 0.3–6.2)
ERYTHROCYTE [DISTWIDTH] IN BLOOD BY AUTOMATED COUNT: 13.6 % (ref 12.3–15.4)
GLUCOSE SERPL-MCNC: 105 MG/DL (ref 65–99)
HBA1C MFR BLD: 5.5 % (ref 4.8–5.6)
HCT VFR BLD AUTO: 39.2 % (ref 34–46.6)
HGB BLD-MCNC: 12.6 G/DL (ref 12–15.9)
IMM GRANULOCYTES # BLD AUTO: 0.03 10*3/MM3 (ref 0–0.05)
IMM GRANULOCYTES NFR BLD AUTO: 0.4 % (ref 0–0.5)
INR PPP: 0.99 (ref 0.84–1.13)
LYMPHOCYTES # BLD AUTO: 1.98 10*3/MM3 (ref 0.7–3.1)
LYMPHOCYTES NFR BLD AUTO: 29.2 % (ref 19.6–45.3)
MCH RBC QN AUTO: 28.1 PG (ref 26.6–33)
MCHC RBC AUTO-ENTMCNC: 32.1 G/DL (ref 31.5–35.7)
MCV RBC AUTO: 87.3 FL (ref 79–97)
MONOCYTES # BLD AUTO: 0.51 10*3/MM3 (ref 0.1–0.9)
MONOCYTES NFR BLD AUTO: 7.5 % (ref 5–12)
NEUTROPHILS NFR BLD AUTO: 3.97 10*3/MM3 (ref 1.7–7)
NEUTROPHILS NFR BLD AUTO: 58.6 % (ref 42.7–76)
NRBC BLD AUTO-RTO: 0 /100 WBC (ref 0–0.2)
PLATELET # BLD AUTO: 270 10*3/MM3 (ref 140–450)
PMV BLD AUTO: 10.7 FL (ref 6–12)
POTASSIUM SERPL-SCNC: 4.6 MMOL/L (ref 3.5–5.2)
PROTHROMBIN TIME: 13 SECONDS (ref 11.4–14.4)
QT INTERVAL: 478 MS
QTC INTERVAL: 493 MS
RBC # BLD AUTO: 4.49 10*6/MM3 (ref 3.77–5.28)
SODIUM SERPL-SCNC: 143 MMOL/L (ref 136–145)
WBC NRBC COR # BLD: 6.78 10*3/MM3 (ref 3.4–10.8)

## 2022-05-20 PROCEDURE — 85025 COMPLETE CBC W/AUTO DIFF WBC: CPT

## 2022-05-20 PROCEDURE — 93005 ELECTROCARDIOGRAM TRACING: CPT

## 2022-05-20 PROCEDURE — 36415 COLL VENOUS BLD VENIPUNCTURE: CPT

## 2022-05-20 PROCEDURE — 80048 BASIC METABOLIC PNL TOTAL CA: CPT

## 2022-05-20 PROCEDURE — 85610 PROTHROMBIN TIME: CPT

## 2022-05-20 PROCEDURE — 85730 THROMBOPLASTIN TIME PARTIAL: CPT

## 2022-05-20 PROCEDURE — G0480 DRUG TEST DEF 1-7 CLASSES: HCPCS

## 2022-05-20 PROCEDURE — 83036 HEMOGLOBIN GLYCOSYLATED A1C: CPT

## 2022-05-20 PROCEDURE — 93010 ELECTROCARDIOGRAM REPORT: CPT | Performed by: INTERNAL MEDICINE

## 2022-05-20 ASSESSMENT — KOOS JR
KOOS JR SCORE: 13
KOOS JR SCORE: 54.84

## 2022-05-20 NOTE — PAT
An arrival time for procedure was not given during PAT visit. If patient had any questions or concerns about their arrival time, they were instructed to contact their surgeon/physician.  Additionally, if the patient referred to an arrival time that was acquired from their my chart account, patient was encouraged to verify that time with their surgeon/physician.  NO arrival times given in Pre Admission Testing Department.    Patient to apply Chlorhexadine wipes  to surgical area (as instructed) the night before procedure and the AM of procedure. Wipes provided.    Patient instructed to drink 20 ounces (or until full) of Gatorade and it needs to be completed 1 hour (for Main OR patients) or 2 hours (scheduled  section patients) before given arrival time for procedure (NO RED Gatorade)    Patient verbalized understanding.    Patient denies any current skin issues.     Discussed with patient options for receiving total joint replacement education and assessed patient's ability and preference. Joint Replacement Guide given to patient during PAT visit since not received a copy within the last year. Encouraged patient/family to read guide thoroughly and notify PAT staff with any questions or concerns. Handout provided directing patient to links to watch online videos related to joint replacement surgery on the [facility] website. The handout gives detailed instructions for joining an online joint replacement class through Zoom or phone conference offered on . Patient agreed to participate by watching videos online. Patient verbalized understanding of instructions and to complete the online learning tool survey. Encouraged to share information with family and/or . An overview of the joint replacement education was provided during the visit including general perioperative instructions that are routine for all surgical patients (PAT PASS, wipes, directions to pre-op, etc.).  .  Per Anesthesia  Request, patient instructed not to take their ACE/ARB medications on the AM of surgery.    Prescription for Bactroban and Chlorhexidine shower called into patient's pharmacy or [facility] pharmacy by patient's surgeon.  Reinforced with patient to  the prescription from applicable pharmacy if they haven't already.  Verbal and written instructions given regarding proper use of the Bactroban and Chlorhexidine to patient and/or famlily during PAT visit. Patient/family also instructed to complete checklist and return it to Pre-op on the day of surgery.  Patient and/or family verbalized understanding.        PATIENT COVID TEST SCHEDULED FOR 05/30/2022. PATIENT AWARE.    PATIENT SEEN BY BLANE HO PA-C ON 05/18/2022. SPOKE TO HIS OFFICE DURING PATIENT'S PAT APPT TODAY AND THEY SAID THAT BLANE WOULD BE GIVING THE PATIENT A CALL FOR FURTHER INSTRUCTIONS. AFTER HER APPT ON 05/18/2022 DR BEASLEY FELT THAT FURTHER TESTING NEEDED TO BE COMPLETED BEFORE THE PATIENT PROCEEDED WITH THE KNEE ARTHROPLASTY SURGERY WITH DR DESAI ON 06/01/2022. LEFT A MESSAGE FOR GLORIA AT DR DESAI'S OFFICE EXPLAINING THIS AND THAT THE PATIENT WOULD RECEIVE FURTHER INSTRUCTIONS FROM THEIR OFFICE AS WELL. EXPLAINED THIS TO THE PATIENT. PATIENT VERBALIZED UNDERSTANDING.

## 2022-05-24 ENCOUNTER — TELEPHONE (OUTPATIENT)
Dept: CARDIOLOGY | Facility: CLINIC | Age: 72
End: 2022-05-24

## 2022-05-24 NOTE — TELEPHONE ENCOUNTER
Called pt to advise her that Maxime has ordered a stress test on her after speaking with . No answer LM.

## 2022-05-25 LAB
COTININE SERPL-MCNC: <1 NG/ML
NICOTINE SERPL-MCNC: <1 NG/ML

## 2022-05-30 ENCOUNTER — APPOINTMENT (OUTPATIENT)
Dept: PREADMISSION TESTING | Facility: HOSPITAL | Age: 72
End: 2022-05-30

## 2022-06-06 ENCOUNTER — OFFICE VISIT (OUTPATIENT)
Dept: CARDIOLOGY | Facility: HOSPITAL | Age: 72
End: 2022-06-06

## 2022-06-06 VITALS
HEART RATE: 81 BPM | WEIGHT: 203.13 LBS | HEIGHT: 61 IN | TEMPERATURE: 97.4 F | OXYGEN SATURATION: 94 % | RESPIRATION RATE: 20 BRPM | DIASTOLIC BLOOD PRESSURE: 64 MMHG | BODY MASS INDEX: 38.35 KG/M2 | SYSTOLIC BLOOD PRESSURE: 134 MMHG

## 2022-06-06 DIAGNOSIS — R94.31 ABNORMAL EKG: ICD-10-CM

## 2022-06-06 DIAGNOSIS — R07.2 PRECORDIAL PAIN: Primary | ICD-10-CM

## 2022-06-06 DIAGNOSIS — J44.9 CHRONIC OBSTRUCTIVE PULMONARY DISEASE, UNSPECIFIED COPD TYPE: ICD-10-CM

## 2022-06-06 DIAGNOSIS — R06.09 DOE (DYSPNEA ON EXERTION): ICD-10-CM

## 2022-06-06 DIAGNOSIS — E78.5 DYSLIPIDEMIA: ICD-10-CM

## 2022-06-06 PROCEDURE — 99214 OFFICE O/P EST MOD 30 MIN: CPT | Performed by: NURSE PRACTITIONER

## 2022-06-06 RX ORDER — CYANOCOBALAMIN (VITAMIN B-12) 500 MCG
800 TABLET ORAL DAILY
COMMUNITY

## 2022-06-06 RX ORDER — ALBUTEROL SULFATE 2.5 MG/3ML
2.5 SOLUTION RESPIRATORY (INHALATION) EVERY 4 HOURS PRN
COMMUNITY

## 2022-06-06 RX ORDER — GLUCOSAMINE/D3/BOSWELLIA SERRA 1500MG-400
10000 TABLET ORAL DAILY
COMMUNITY

## 2022-06-06 RX ORDER — METFORMIN HYDROCHLORIDE 500 MG/1
500 TABLET, EXTENDED RELEASE ORAL EVERY EVENING
COMMUNITY
End: 2022-12-19

## 2022-06-09 NOTE — PROGRESS NOTES
"Chief Complaint  cardiac clerance and Establish Care    Subjective    History of Present Illness {CC  Problem List  Visit  Diagnosis   Encounters  Notes  Medications  Labs  Result Review Imaging  Media :23}       History of Present Illness   71-year-old female presents the office today for ongoing evaluation of her chest pain.  Patient is scheduled for left total knee replacement with Dr. Murillo. Patient has a history of palpitations, hypertension,dyslipidemia, dyspnea, osteoarthritis of left knee, trochanteric bursitis of the left hip and chronic HFrEF with recovered LVEF.  She also has a history of prediabetes and tobacco abuse but quit in 2004 with a 30-pack-year history.  Patient notes that she does have occasional chest pain that she describes as pressure located in her left chest.  Denies any radiation.  Patient is unclear if chest pain worsens with exertion.  Notes intermittent dyspnea which is at baseline.      Vital Signs:   Vitals:    06/06/22 1419 06/06/22 1421 06/06/22 1422   BP: 128/60 139/65 134/64   BP Location: Right arm Left arm Left arm   Patient Position: Sitting Standing Sitting   Cuff Size: Adult Adult Adult   Pulse: 78 84 81   Resp:   20   Temp:   97.4 °F (36.3 °C)   TempSrc:   Temporal   SpO2: 96% 94% 94%   Weight:   92.1 kg (203 lb 2 oz)   Height:   154.9 cm (61\")     Body mass index is 38.38 kg/m².  Physical Exam  Vitals and nursing note reviewed.   Constitutional:       Appearance: Normal appearance.   HENT:      Head: Normocephalic.   Eyes:      Pupils: Pupils are equal, round, and reactive to light.   Cardiovascular:      Rate and Rhythm: Normal rate and regular rhythm.      Pulses: Normal pulses.      Heart sounds: Normal heart sounds. No murmur heard.  Pulmonary:      Effort: Pulmonary effort is normal.      Breath sounds: Normal breath sounds.   Abdominal:      General: Bowel sounds are normal.      Palpations: Abdomen is soft.   Musculoskeletal:         General: Normal " range of motion.      Cervical back: Normal range of motion.      Right lower leg: No edema.      Left lower leg: No edema.   Skin:     General: Skin is warm and dry.      Capillary Refill: Capillary refill takes less than 2 seconds.   Neurological:      Mental Status: She is alert and oriented to person, place, and time.   Psychiatric:         Mood and Affect: Mood normal.         Thought Content: Thought content normal.              Result Review  Data Reviewed:{ Labs  Result Review  Imaging  Med Tab  Media :23}   Vent. Rate :  64 BPM     Atrial Rate :  64 BPM     P-R Int : 174 ms          QRS Dur : 140 ms      QT Int : 478 ms       P-R-T Axes :  38 -29  35 degrees     QTc Int : 493 ms     Normal sinus rhythm  Left bundle branch block  Abnormal ECG  When compared with ECG of 05-OCT-2021 11:19,  Nonspecific T wave abnormality now evident in Lateral leads  Confirmed by MD Israel, Manohar ([id number]) on 5/20/2022 3:58:21 PM             Assessment and Plan {CC Problem List  Visit Diagnosis  ROS  Review (Popup)  Health Maintenance  Quality  BestPractice  Medications  SmartSets  SnapShot Encounters  Media :23}   1. Precordial pain  katie score 2  - Stress Test With Myocardial Perfusion (1 Day); Future    2. Abnormal EKG    - Stress Test With Myocardial Perfusion (1 Day); Future    3. GANNON (dyspnea on exertion)    - Stress Test With Myocardial Perfusion (1 Day); Future    4. Dyslipidemia  Stable on Lipitor  - Stress Test With Myocardial Perfusion (1 Day); Future    5. Chronic obstructive pulmonary disease, unspecified COPD type (HCC)  Currently without exacerbation  - Stress Test With Myocardial Perfusion (1 Day); Future    Will determine cardiac clearance status after stress test    Follow Up {Instructions Charge Capture  Follow-up Communications :23}   Return if symptoms worsen or fail to improve.    Patient was given instructions and counseling regarding her condition or for health maintenance advice.  Please see specific information pulled into the AVS if appropriate.  Patient was instructed to call the [facility] with any questions, concerns, or worsening symptoms.

## 2022-06-10 ENCOUNTER — HOSPITAL ENCOUNTER (OUTPATIENT)
Dept: CARDIOLOGY | Facility: HOSPITAL | Age: 72
Discharge: HOME | End: 2022-06-10
Admitting: NURSE PRACTITIONER

## 2022-06-10 VITALS
BODY MASS INDEX: 38.34 KG/M2 | DIASTOLIC BLOOD PRESSURE: 69 MMHG | WEIGHT: 203.04 LBS | SYSTOLIC BLOOD PRESSURE: 136 MMHG | OXYGEN SATURATION: 93 % | HEART RATE: 73 BPM | HEIGHT: 61 IN

## 2022-06-10 DIAGNOSIS — E78.5 DYSLIPIDEMIA: ICD-10-CM

## 2022-06-10 DIAGNOSIS — R06.09 DOE (DYSPNEA ON EXERTION): ICD-10-CM

## 2022-06-10 DIAGNOSIS — J44.9 CHRONIC OBSTRUCTIVE PULMONARY DISEASE, UNSPECIFIED COPD TYPE: ICD-10-CM

## 2022-06-10 DIAGNOSIS — R07.2 PRECORDIAL PAIN: ICD-10-CM

## 2022-06-10 DIAGNOSIS — R94.31 ABNORMAL EKG: ICD-10-CM

## 2022-06-10 PROCEDURE — A9500 TC99M SESTAMIBI: HCPCS | Performed by: NURSE PRACTITIONER

## 2022-06-10 PROCEDURE — 93017 CV STRESS TEST TRACING ONLY: CPT

## 2022-06-10 PROCEDURE — 78452 HT MUSCLE IMAGE SPECT MULT: CPT

## 2022-06-10 PROCEDURE — 93018 CV STRESS TEST I&R ONLY: CPT | Performed by: INTERNAL MEDICINE

## 2022-06-10 PROCEDURE — 78452 HT MUSCLE IMAGE SPECT MULT: CPT | Performed by: INTERNAL MEDICINE

## 2022-06-10 PROCEDURE — 0 TECHNETIUM SESTAMIBI: Performed by: NURSE PRACTITIONER

## 2022-06-10 PROCEDURE — 25010000002 REGADENOSON 0.4 MG/5ML SOLUTION: Performed by: NURSE PRACTITIONER

## 2022-06-10 RX ADMIN — TECHNETIUM TC 99M SESTAMIBI 1 DOSE: 1 INJECTION INTRAVENOUS at 09:39

## 2022-06-10 RX ADMIN — TECHNETIUM TC 99M SESTAMIBI 1 DOSE: 1 INJECTION INTRAVENOUS at 08:15

## 2022-06-10 RX ADMIN — REGADENOSON 0.4 MG: 0.08 INJECTION, SOLUTION INTRAVENOUS at 09:39

## 2022-06-13 ENCOUNTER — TELEPHONE (OUTPATIENT)
Dept: CARDIOLOGY | Facility: HOSPITAL | Age: 72
End: 2022-06-13

## 2022-06-13 LAB
BH CV REST NUCLEAR ISOTOPE DOSE: 9.1 MCI
BH CV STRESS BP STAGE 2: NORMAL
BH CV STRESS BP STAGE 4: NORMAL
BH CV STRESS COMMENTS STAGE 1: NORMAL
BH CV STRESS DOSE REGADENOSON STAGE 1: 0.4
BH CV STRESS DURATION MIN STAGE 1: 1
BH CV STRESS DURATION MIN STAGE 2: 1
BH CV STRESS DURATION MIN STAGE 3: 1
BH CV STRESS DURATION MIN STAGE 4: 1
BH CV STRESS DURATION SEC STAGE 1: 10
BH CV STRESS DURATION SEC STAGE 2: 0
BH CV STRESS HR STAGE 1: 96
BH CV STRESS HR STAGE 2: 96
BH CV STRESS HR STAGE 3: 96
BH CV STRESS HR STAGE 4: 91
BH CV STRESS NUCLEAR ISOTOPE DOSE: 32.5 MCI
BH CV STRESS O2 STAGE 1: 92
BH CV STRESS O2 STAGE 2: 93
BH CV STRESS O2 STAGE 3: 99
BH CV STRESS O2 STAGE 4: 98
BH CV STRESS PROTOCOL 1: NORMAL
BH CV STRESS RECOVERY BP: NORMAL MMHG
BH CV STRESS RECOVERY HR: 86 BPM
BH CV STRESS RECOVERY O2: 96 %
BH CV STRESS STAGE 1: 1
BH CV STRESS STAGE 2: 2
BH CV STRESS STAGE 3: 3
BH CV STRESS STAGE 4: 4
LV EF NUC BP: 76 %
MAXIMAL PREDICTED HEART RATE: 148 BPM
PERCENT MAX PREDICTED HR: 62.16 %
STRESS BASELINE BP: NORMAL MMHG
STRESS BASELINE HR: 75 BPM
STRESS O2 SAT REST: 93 %
STRESS PERCENT HR: 73 %
STRESS POST ESTIMATED WORKLOAD: 1 METS
STRESS POST EXERCISE DUR MIN: 4 MIN
STRESS POST EXERCISE DUR SEC: 0 SEC
STRESS POST O2 SAT PEAK: 93 %
STRESS POST PEAK BP: NORMAL MMHG
STRESS POST PEAK HR: 92 BPM
STRESS TARGET HR: 126 BPM

## 2022-06-13 NOTE — TELEPHONE ENCOUNTER
Reviewed stress test with patient. Stress test showed no ischemia.   RCRI 0. Patient is low risk and is cleared to proceed with Left TKR with Dr Murillo.

## 2022-06-20 ENCOUNTER — LAB (OUTPATIENT)
Dept: LAB | Facility: HOSPITAL | Age: 72
End: 2022-06-20

## 2022-06-20 DIAGNOSIS — M17.12 PRIMARY OSTEOARTHRITIS OF LEFT KNEE: ICD-10-CM

## 2022-06-20 PROCEDURE — C9803 HOPD COVID-19 SPEC COLLECT: HCPCS

## 2022-06-20 PROCEDURE — U0005 INFEC AGEN DETEC AMPLI PROBE: HCPCS | Performed by: ORTHOPAEDIC SURGERY

## 2022-06-20 PROCEDURE — U0003 INFECTIOUS AGENT DETECTION BY NUCLEIC ACID (DNA OR RNA); SEVERE ACUTE RESPIRATORY SYNDROME CORONAVIRUS 2 (SARS-COV-2) (CORONAVIRUS DISEASE [COVID-19]), AMPLIFIED PROBE TECHNIQUE, MAKING USE OF HIGH THROUGHPUT TECHNOLOGIES AS DESCRIBED BY CMS-2020-01-R: HCPCS | Performed by: ORTHOPAEDIC SURGERY

## 2022-06-21 ENCOUNTER — ANESTHESIA EVENT (OUTPATIENT)
Dept: PERIOP | Facility: HOSPITAL | Age: 72
End: 2022-06-21

## 2022-06-21 LAB — SARS-COV-2 RNA RESP QL NAA+PROBE: NOT DETECTED

## 2022-06-22 ENCOUNTER — APPOINTMENT (OUTPATIENT)
Dept: GENERAL RADIOLOGY | Facility: HOSPITAL | Age: 72
End: 2022-06-22

## 2022-06-22 ENCOUNTER — ANESTHESIA EVENT CONVERTED (OUTPATIENT)
Dept: ANESTHESIOLOGY | Facility: HOSPITAL | Age: 72
End: 2022-06-22

## 2022-06-22 ENCOUNTER — ANESTHESIA (OUTPATIENT)
Dept: PERIOP | Facility: HOSPITAL | Age: 72
End: 2022-06-22

## 2022-06-22 ENCOUNTER — HOSPITAL ENCOUNTER (OUTPATIENT)
Facility: HOSPITAL | Age: 72
Discharge: HOME | End: 2022-06-22
Attending: ORTHOPAEDIC SURGERY | Admitting: ORTHOPAEDIC SURGERY

## 2022-06-22 VITALS
HEIGHT: 61 IN | OXYGEN SATURATION: 99 % | RESPIRATION RATE: 16 BRPM | TEMPERATURE: 98.3 F | SYSTOLIC BLOOD PRESSURE: 118 MMHG | DIASTOLIC BLOOD PRESSURE: 68 MMHG | WEIGHT: 196 LBS | HEART RATE: 97 BPM | BODY MASS INDEX: 37 KG/M2

## 2022-06-22 DIAGNOSIS — Z96.652 STATUS POST TOTAL LEFT KNEE REPLACEMENT: Primary | ICD-10-CM

## 2022-06-22 DIAGNOSIS — M17.12 PRIMARY OSTEOARTHRITIS OF LEFT KNEE: ICD-10-CM

## 2022-06-22 PROBLEM — I10 HYPERTENSION: Status: ACTIVE | Noted: 2022-06-22

## 2022-06-22 PROBLEM — J44.9 COPD (CHRONIC OBSTRUCTIVE PULMONARY DISEASE): Status: ACTIVE | Noted: 2022-06-22

## 2022-06-22 LAB
GLUCOSE BLDC GLUCOMTR-MCNC: 81 MG/DL (ref 70–130)
GLUCOSE BLDC GLUCOMTR-MCNC: 95 MG/DL (ref 70–130)
POTASSIUM SERPL-SCNC: 4.1 MMOL/L (ref 3.5–5.2)

## 2022-06-22 PROCEDURE — 84132 ASSAY OF SERUM POTASSIUM: CPT | Performed by: ANESTHESIOLOGY

## 2022-06-22 PROCEDURE — C1755 CATHETER, INTRASPINAL: HCPCS | Performed by: ORTHOPAEDIC SURGERY

## 2022-06-22 PROCEDURE — 82962 GLUCOSE BLOOD TEST: CPT

## 2022-06-22 PROCEDURE — 25010000002 ROPIVACAINE PER 1 MG: Performed by: NURSE ANESTHETIST, CERTIFIED REGISTERED

## 2022-06-22 PROCEDURE — S0260 H&P FOR SURGERY: HCPCS

## 2022-06-22 PROCEDURE — 25010000002 DEXAMETHASONE PER 1 MG: Performed by: NURSE ANESTHETIST, CERTIFIED REGISTERED

## 2022-06-22 PROCEDURE — 25010000002 CEFAZOLIN IN DEXTROSE 2-4 GM/100ML-% SOLUTION: Performed by: ORTHOPAEDIC SURGERY

## 2022-06-22 PROCEDURE — C1776 JOINT DEVICE (IMPLANTABLE): HCPCS | Performed by: ORTHOPAEDIC SURGERY

## 2022-06-22 PROCEDURE — 63710000001 ACETAMINOPHEN 500 MG TABLET: Performed by: ORTHOPAEDIC SURGERY

## 2022-06-22 PROCEDURE — 63710000001 FAMOTIDINE 20 MG TABLET: Performed by: ANESTHESIOLOGY

## 2022-06-22 PROCEDURE — 27447 TOTAL KNEE ARTHROPLASTY: CPT | Performed by: ORTHOPAEDIC SURGERY

## 2022-06-22 PROCEDURE — 25010000002 ONDANSETRON PER 1 MG: Performed by: NURSE ANESTHETIST, CERTIFIED REGISTERED

## 2022-06-22 PROCEDURE — A9270 NON-COVERED ITEM OR SERVICE: HCPCS | Performed by: ORTHOPAEDIC SURGERY

## 2022-06-22 PROCEDURE — 97110 THERAPEUTIC EXERCISES: CPT

## 2022-06-22 PROCEDURE — 63710000001 MUPIROCIN 2 % OINTMENT 1 G TUBE: Performed by: ORTHOPAEDIC SURGERY

## 2022-06-22 PROCEDURE — 97161 PT EVAL LOW COMPLEX 20 MIN: CPT

## 2022-06-22 PROCEDURE — A9270 NON-COVERED ITEM OR SERVICE: HCPCS | Performed by: ANESTHESIOLOGY

## 2022-06-22 PROCEDURE — 73560 X-RAY EXAM OF KNEE 1 OR 2: CPT

## 2022-06-22 PROCEDURE — G0378 HOSPITAL OBSERVATION PER HR: HCPCS

## 2022-06-22 PROCEDURE — 63710000001 MELOXICAM 15 MG TABLET: Performed by: ORTHOPAEDIC SURGERY

## 2022-06-22 PROCEDURE — 63710000001 POVIDONE-IODINE 10 % SOLUTION 30 ML BOTTLE: Performed by: ORTHOPAEDIC SURGERY

## 2022-06-22 PROCEDURE — 25010000002 PROPOFOL 10 MG/ML EMULSION: Performed by: NURSE ANESTHETIST, CERTIFIED REGISTERED

## 2022-06-22 PROCEDURE — 27447 TOTAL KNEE ARTHROPLASTY: CPT | Performed by: PHYSICIAN ASSISTANT

## 2022-06-22 PROCEDURE — 63710000001 PREGABALIN 75 MG CAPSULE: Performed by: ORTHOPAEDIC SURGERY

## 2022-06-22 DEVICE — TIBIAL COMPONENT
Type: IMPLANTABLE DEVICE | Site: KNEE | Status: FUNCTIONAL
Brand: TRIATHLON

## 2022-06-22 DEVICE — CAP TOTAL KN CMTLS 4 PC: Type: IMPLANTABLE DEVICE | Site: KNEE | Status: FUNCTIONAL

## 2022-06-22 DEVICE — CRUCIATE RETAINING FEMORAL
Type: IMPLANTABLE DEVICE | Site: KNEE | Status: FUNCTIONAL
Brand: TRIATHLON

## 2022-06-22 DEVICE — STRATAFIX (SPIRAL PDS PLUS), BIDIRECTIONAL
Type: IMPLANTABLE DEVICE | Site: KNEE | Status: FUNCTIONAL
Brand: STRATAFIX

## 2022-06-22 DEVICE — PATELLA
Type: IMPLANTABLE DEVICE | Site: KNEE | Status: FUNCTIONAL
Brand: TRIATHLON

## 2022-06-22 DEVICE — TIBIAL BEARING INSERT - CS
Type: IMPLANTABLE DEVICE | Site: KNEE | Status: FUNCTIONAL
Brand: TRIATHLON

## 2022-06-22 RX ORDER — BUSPIRONE HYDROCHLORIDE 10 MG/1
10 TABLET ORAL 2 TIMES DAILY
Status: DISCONTINUED | OUTPATIENT
Start: 2022-06-22 | End: 2022-06-22 | Stop reason: HOSPADM

## 2022-06-22 RX ORDER — LABETALOL HYDROCHLORIDE 5 MG/ML
5 INJECTION, SOLUTION INTRAVENOUS
Status: DISCONTINUED | OUTPATIENT
Start: 2022-06-22 | End: 2022-06-22 | Stop reason: HOSPADM

## 2022-06-22 RX ORDER — ACETAMINOPHEN 500 MG
1000 TABLET ORAL EVERY 8 HOURS
Qty: 42 TABLET | Refills: 0 | Status: SHIPPED | OUTPATIENT
Start: 2022-06-22

## 2022-06-22 RX ORDER — MELOXICAM 15 MG/1
15 TABLET ORAL ONCE
Status: COMPLETED | OUTPATIENT
Start: 2022-06-22 | End: 2022-06-22

## 2022-06-22 RX ORDER — MAGNESIUM HYDROXIDE 1200 MG/15ML
LIQUID ORAL AS NEEDED
Status: DISCONTINUED | OUTPATIENT
Start: 2022-06-22 | End: 2022-06-22 | Stop reason: HOSPADM

## 2022-06-22 RX ORDER — ROPIVACAINE HYDROCHLORIDE 2 MG/ML
4 INJECTION, SOLUTION EPIDURAL; INFILTRATION; PERINEURAL CONTINUOUS
Start: 2022-06-22

## 2022-06-22 RX ORDER — ONDANSETRON 2 MG/ML
INJECTION INTRAMUSCULAR; INTRAVENOUS AS NEEDED
Status: DISCONTINUED | OUTPATIENT
Start: 2022-06-22 | End: 2022-06-22 | Stop reason: SURG

## 2022-06-22 RX ORDER — LISINOPRIL 10 MG/1
10 TABLET ORAL 2 TIMES DAILY
Status: DISCONTINUED | OUTPATIENT
Start: 2022-06-22 | End: 2022-06-22 | Stop reason: HOSPADM

## 2022-06-22 RX ORDER — OXYCODONE HYDROCHLORIDE 5 MG/1
5 TABLET ORAL EVERY 4 HOURS PRN
Status: DISCONTINUED | OUTPATIENT
Start: 2022-06-22 | End: 2022-06-22 | Stop reason: HOSPADM

## 2022-06-22 RX ORDER — EPHEDRINE SULFATE 50 MG/ML
5 INJECTION, SOLUTION INTRAVENOUS ONCE AS NEEDED
Status: DISCONTINUED | OUTPATIENT
Start: 2022-06-22 | End: 2022-06-22 | Stop reason: HOSPADM

## 2022-06-22 RX ORDER — ONDANSETRON 4 MG/1
4 TABLET, FILM COATED ORAL EVERY 6 HOURS PRN
Status: DISCONTINUED | OUTPATIENT
Start: 2022-06-22 | End: 2022-06-22 | Stop reason: HOSPADM

## 2022-06-22 RX ORDER — SODIUM CHLORIDE 0.9 % (FLUSH) 0.9 %
10 SYRINGE (ML) INJECTION AS NEEDED
Status: DISCONTINUED | OUTPATIENT
Start: 2022-06-22 | End: 2022-06-22 | Stop reason: HOSPADM

## 2022-06-22 RX ORDER — ACETAMINOPHEN 650 MG/1
650 SUPPOSITORY RECTAL ONCE AS NEEDED
Status: DISCONTINUED | OUTPATIENT
Start: 2022-06-22 | End: 2022-06-22 | Stop reason: HOSPADM

## 2022-06-22 RX ORDER — PREGABALIN 75 MG/1
75 CAPSULE ORAL ONCE
Status: COMPLETED | OUTPATIENT
Start: 2022-06-22 | End: 2022-06-22

## 2022-06-22 RX ORDER — PROPOFOL 10 MG/ML
VIAL (ML) INTRAVENOUS AS NEEDED
Status: DISCONTINUED | OUTPATIENT
Start: 2022-06-22 | End: 2022-06-22 | Stop reason: SURG

## 2022-06-22 RX ORDER — ASPIRIN 81 MG/1
81 TABLET ORAL EVERY 12 HOURS SCHEDULED
Status: DISCONTINUED | OUTPATIENT
Start: 2022-06-23 | End: 2022-06-22 | Stop reason: HOSPADM

## 2022-06-22 RX ORDER — TRANEXAMIC ACID 10 MG/ML
1000 INJECTION, SOLUTION INTRAVENOUS ONCE
Status: COMPLETED | OUTPATIENT
Start: 2022-06-22 | End: 2022-06-22

## 2022-06-22 RX ORDER — ACETAMINOPHEN 500 MG
1000 TABLET ORAL EVERY 8 HOURS
Status: DISCONTINUED | OUTPATIENT
Start: 2022-06-22 | End: 2022-06-22 | Stop reason: HOSPADM

## 2022-06-22 RX ORDER — LIDOCAINE HYDROCHLORIDE 10 MG/ML
0.5 INJECTION, SOLUTION EPIDURAL; INFILTRATION; INTRACAUDAL; PERINEURAL ONCE AS NEEDED
Status: DISCONTINUED | OUTPATIENT
Start: 2022-06-22 | End: 2022-06-22 | Stop reason: HOSPADM

## 2022-06-22 RX ORDER — FAMOTIDINE 20 MG/1
40 TABLET, FILM COATED ORAL NIGHTLY
Status: DISCONTINUED | OUTPATIENT
Start: 2022-06-22 | End: 2022-06-22 | Stop reason: HOSPADM

## 2022-06-22 RX ORDER — DOCUSATE SODIUM 100 MG/1
100 CAPSULE, LIQUID FILLED ORAL 2 TIMES DAILY
Qty: 60 CAPSULE | Refills: 0 | Status: SHIPPED | OUTPATIENT
Start: 2022-06-22 | End: 2022-12-19

## 2022-06-22 RX ORDER — FAMOTIDINE 10 MG/ML
20 INJECTION, SOLUTION INTRAVENOUS ONCE
Status: DISCONTINUED | OUTPATIENT
Start: 2022-06-22 | End: 2022-06-22 | Stop reason: HOSPADM

## 2022-06-22 RX ORDER — CEFAZOLIN SODIUM 2 G/100ML
2 INJECTION, SOLUTION INTRAVENOUS EVERY 8 HOURS
Status: DISCONTINUED | OUTPATIENT
Start: 2022-06-22 | End: 2022-06-22 | Stop reason: HOSPADM

## 2022-06-22 RX ORDER — ACETAMINOPHEN 500 MG
1000 TABLET ORAL ONCE
Status: COMPLETED | OUTPATIENT
Start: 2022-06-22 | End: 2022-06-22

## 2022-06-22 RX ORDER — MIDAZOLAM HYDROCHLORIDE 1 MG/ML
0.5 INJECTION INTRAMUSCULAR; INTRAVENOUS
Status: DISCONTINUED | OUTPATIENT
Start: 2022-06-22 | End: 2022-06-22 | Stop reason: HOSPADM

## 2022-06-22 RX ORDER — SODIUM CHLORIDE 0.9 % (FLUSH) 0.9 %
10 SYRINGE (ML) INJECTION EVERY 12 HOURS SCHEDULED
Status: DISCONTINUED | OUTPATIENT
Start: 2022-06-22 | End: 2022-06-22 | Stop reason: HOSPADM

## 2022-06-22 RX ORDER — DEXAMETHASONE SODIUM PHOSPHATE 4 MG/ML
INJECTION, SOLUTION INTRA-ARTICULAR; INTRALESIONAL; INTRAMUSCULAR; INTRAVENOUS; SOFT TISSUE AS NEEDED
Status: DISCONTINUED | OUTPATIENT
Start: 2022-06-22 | End: 2022-06-22 | Stop reason: SURG

## 2022-06-22 RX ORDER — SODIUM CHLORIDE 0.9 % (FLUSH) 0.9 %
3-10 SYRINGE (ML) INJECTION AS NEEDED
Status: DISCONTINUED | OUTPATIENT
Start: 2022-06-22 | End: 2022-06-22 | Stop reason: HOSPADM

## 2022-06-22 RX ORDER — OXYCODONE HYDROCHLORIDE 5 MG/1
10 TABLET ORAL EVERY 4 HOURS PRN
Status: DISCONTINUED | OUTPATIENT
Start: 2022-06-22 | End: 2022-06-22 | Stop reason: HOSPADM

## 2022-06-22 RX ORDER — ROPIVACAINE HYDROCHLORIDE 2 MG/ML
INJECTION, SOLUTION EPIDURAL; INFILTRATION; PERINEURAL CONTINUOUS
Status: DISCONTINUED | OUTPATIENT
Start: 2022-06-22 | End: 2022-06-22 | Stop reason: HOSPADM

## 2022-06-22 RX ORDER — ATORVASTATIN CALCIUM 10 MG/1
10 TABLET, FILM COATED ORAL DAILY
Status: DISCONTINUED | OUTPATIENT
Start: 2022-06-22 | End: 2022-06-22 | Stop reason: HOSPADM

## 2022-06-22 RX ORDER — METOPROLOL SUCCINATE 50 MG/1
50 TABLET, EXTENDED RELEASE ORAL DAILY
Status: DISCONTINUED | OUTPATIENT
Start: 2022-06-22 | End: 2022-06-22 | Stop reason: HOSPADM

## 2022-06-22 RX ORDER — SODIUM CHLORIDE 0.9 % (FLUSH) 0.9 %
3 SYRINGE (ML) INJECTION EVERY 12 HOURS SCHEDULED
Status: DISCONTINUED | OUTPATIENT
Start: 2022-06-22 | End: 2022-06-22 | Stop reason: HOSPADM

## 2022-06-22 RX ORDER — ONDANSETRON 2 MG/ML
4 INJECTION INTRAMUSCULAR; INTRAVENOUS EVERY 6 HOURS PRN
Status: CANCELLED | OUTPATIENT
Start: 2022-06-22

## 2022-06-22 RX ORDER — ACETAMINOPHEN 325 MG/1
650 TABLET ORAL ONCE AS NEEDED
Status: DISCONTINUED | OUTPATIENT
Start: 2022-06-22 | End: 2022-06-22 | Stop reason: HOSPADM

## 2022-06-22 RX ORDER — CEFAZOLIN SODIUM 2 G/100ML
2 INJECTION, SOLUTION INTRAVENOUS ONCE
Status: COMPLETED | OUTPATIENT
Start: 2022-06-22 | End: 2022-06-22

## 2022-06-22 RX ORDER — FENTANYL CITRATE 50 UG/ML
50 INJECTION, SOLUTION INTRAMUSCULAR; INTRAVENOUS
Status: DISCONTINUED | OUTPATIENT
Start: 2022-06-22 | End: 2022-06-22 | Stop reason: HOSPADM

## 2022-06-22 RX ORDER — ALBUTEROL SULFATE 2.5 MG/3ML
2.5 SOLUTION RESPIRATORY (INHALATION) EVERY 4 HOURS PRN
Status: DISCONTINUED | OUTPATIENT
Start: 2022-06-22 | End: 2022-06-22 | Stop reason: HOSPADM

## 2022-06-22 RX ORDER — NALOXONE HCL 0.4 MG/ML
0.1 VIAL (ML) INJECTION
Status: DISCONTINUED | OUTPATIENT
Start: 2022-06-22 | End: 2022-06-22 | Stop reason: HOSPADM

## 2022-06-22 RX ORDER — FAMOTIDINE 20 MG/1
20 TABLET, FILM COATED ORAL ONCE
Status: COMPLETED | OUTPATIENT
Start: 2022-06-22 | End: 2022-06-22

## 2022-06-22 RX ORDER — OXYCODONE HYDROCHLORIDE 5 MG/1
5 TABLET ORAL ONCE AS NEEDED
Status: DISCONTINUED | OUTPATIENT
Start: 2022-06-22 | End: 2022-06-22 | Stop reason: HOSPADM

## 2022-06-22 RX ORDER — BUPIVACAINE HYDROCHLORIDE 2.5 MG/ML
INJECTION, SOLUTION EPIDURAL; INFILTRATION; INTRACAUDAL
Status: COMPLETED | OUTPATIENT
Start: 2022-06-22 | End: 2022-06-22

## 2022-06-22 RX ORDER — SODIUM CHLORIDE, SODIUM LACTATE, POTASSIUM CHLORIDE, CALCIUM CHLORIDE 600; 310; 30; 20 MG/100ML; MG/100ML; MG/100ML; MG/100ML
9 INJECTION, SOLUTION INTRAVENOUS CONTINUOUS
Status: DISCONTINUED | OUTPATIENT
Start: 2022-06-22 | End: 2022-06-22 | Stop reason: HOSPADM

## 2022-06-22 RX ORDER — MELOXICAM 15 MG/1
15 TABLET ORAL DAILY
Qty: 10 TABLET | Refills: 0 | Status: SHIPPED | OUTPATIENT
Start: 2022-06-22

## 2022-06-22 RX ORDER — LABETALOL HYDROCHLORIDE 5 MG/ML
10 INJECTION, SOLUTION INTRAVENOUS EVERY 4 HOURS PRN
Status: CANCELLED | OUTPATIENT
Start: 2022-06-22

## 2022-06-22 RX ORDER — OXYCODONE HYDROCHLORIDE 5 MG/1
5 TABLET ORAL EVERY 4 HOURS PRN
Qty: 40 TABLET | Refills: 0 | Status: SHIPPED | OUTPATIENT
Start: 2022-06-22 | End: 2022-12-19

## 2022-06-22 RX ORDER — ONDANSETRON 2 MG/ML
4 INJECTION INTRAMUSCULAR; INTRAVENOUS ONCE AS NEEDED
Status: DISCONTINUED | OUTPATIENT
Start: 2022-06-22 | End: 2022-06-22 | Stop reason: HOSPADM

## 2022-06-22 RX ORDER — MELOXICAM 7.5 MG/1
15 TABLET ORAL DAILY
Status: DISCONTINUED | OUTPATIENT
Start: 2022-06-22 | End: 2022-06-22 | Stop reason: HOSPADM

## 2022-06-22 RX ORDER — BUPIVACAINE HYDROCHLORIDE 5 MG/ML
INJECTION, SOLUTION PERINEURAL
Status: COMPLETED | OUTPATIENT
Start: 2022-06-22 | End: 2022-06-22

## 2022-06-22 RX ORDER — CEFADROXIL 500 MG/1
500 CAPSULE ORAL 2 TIMES DAILY
Qty: 20 CAPSULE | Refills: 0 | Status: SHIPPED | OUTPATIENT
Start: 2022-06-22 | End: 2022-07-02

## 2022-06-22 RX ORDER — SODIUM CHLORIDE 9 MG/ML
100 INJECTION, SOLUTION INTRAVENOUS CONTINUOUS
Status: DISCONTINUED | OUTPATIENT
Start: 2022-06-22 | End: 2022-06-22 | Stop reason: HOSPADM

## 2022-06-22 RX ORDER — ASPIRIN 81 MG/1
81 TABLET ORAL EVERY 12 HOURS SCHEDULED
Qty: 60 TABLET | Refills: 0 | Status: SHIPPED | OUTPATIENT
Start: 2022-06-23

## 2022-06-22 RX ORDER — ONDANSETRON 2 MG/ML
4 INJECTION INTRAMUSCULAR; INTRAVENOUS EVERY 6 HOURS PRN
Status: DISCONTINUED | OUTPATIENT
Start: 2022-06-22 | End: 2022-06-22 | Stop reason: HOSPADM

## 2022-06-22 RX ADMIN — CEFAZOLIN SODIUM 2 G: 2 INJECTION, SOLUTION INTRAVENOUS at 09:57

## 2022-06-22 RX ADMIN — MUPIROCIN 1 APPLICATION: 20 OINTMENT TOPICAL at 08:47

## 2022-06-22 RX ADMIN — PROPOFOL 40 MG: 10 INJECTION, EMULSION INTRAVENOUS at 09:58

## 2022-06-22 RX ADMIN — BUPIVACAINE HYDROCHLORIDE 30 ML: 2.5 INJECTION, SOLUTION EPIDURAL; INFILTRATION; INTRACAUDAL; PERINEURAL at 12:20

## 2022-06-22 RX ADMIN — PROPOFOL 40 MG: 10 INJECTION, EMULSION INTRAVENOUS at 10:03

## 2022-06-22 RX ADMIN — Medication 1000 MG: at 12:14

## 2022-06-22 RX ADMIN — PROPOFOL 75 MCG/KG/MIN: 10 INJECTION, EMULSION INTRAVENOUS at 10:00

## 2022-06-22 RX ADMIN — DEXAMETHASONE SODIUM PHOSPHATE 8 MG: 4 INJECTION, SOLUTION INTRA-ARTICULAR; INTRALESIONAL; INTRAMUSCULAR; INTRAVENOUS; SOFT TISSUE at 10:12

## 2022-06-22 RX ADMIN — FAMOTIDINE 20 MG: 20 TABLET ORAL at 08:47

## 2022-06-22 RX ADMIN — PREGABALIN 75 MG: 75 CAPSULE ORAL at 08:47

## 2022-06-22 RX ADMIN — SODIUM CHLORIDE, POTASSIUM CHLORIDE, SODIUM LACTATE AND CALCIUM CHLORIDE 9 ML/HR: 600; 310; 30; 20 INJECTION, SOLUTION INTRAVENOUS at 08:51

## 2022-06-22 RX ADMIN — TRANEXAMIC ACID 1000 MG: 10 INJECTION, SOLUTION INTRAVENOUS at 10:07

## 2022-06-22 RX ADMIN — ONDANSETRON 4 MG: 2 INJECTION INTRAMUSCULAR; INTRAVENOUS at 10:12

## 2022-06-22 RX ADMIN — BUPIVACAINE HYDROCHLORIDE 1.8 ML: 5 INJECTION, SOLUTION PERINEURAL at 09:59

## 2022-06-22 RX ADMIN — ACETAMINOPHEN 1000 MG: 500 TABLET ORAL at 08:47

## 2022-06-22 RX ADMIN — TRANEXAMIC ACID 1000 MG: 10 INJECTION, SOLUTION INTRAVENOUS at 11:22

## 2022-06-22 RX ADMIN — MELOXICAM 15 MG: 15 TABLET ORAL at 08:47
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
## 2022-06-22 NOTE — PLAN OF CARE
"Goal Outcome Evaluation:  Plan of Care Reviewed With: patient        Progress: no change  Outcome Evaluation: PT eval complete. Pt performed bed mobility with SBA. Pt performed STS and amb 150' with CGAx2 and FWW. No knee buckling noted with ambulation. Pt navigated one 5\" step safely with CGA and BUE support. Pt educated on car transfer and verbalized understanding. HEP reviewed with pt. L knee AROM 13-80. ADLs assessed, no OT consult needed at this time. Pt will need FWW prior to d/c. Recommend d/c home with assist and HHPT today if medically appropriate.  "

## 2022-06-22 NOTE — OP NOTE
OPERATIVE REPORT     DATE OF PROCEDURE: 6/22/2022    SURGEON: Shon Murillo M.D.     ASSISTANT(S): Circulator: Marie Coy RN  Scrub Person: Ana Rosa Boyle RN  Nursing Assistant: José Miguel Dan  Assistant: Amando Mondragon PA  Assistant: Amando Mondragon PA    Note-PA was utilized during the case to facilitate positioning the patient, exposure, retraction, placement of final components and definitive closure.    PREOPERATIVE DIAGNOSIS: Advanced degenerative joint disease of the left knee secondary to osteoarthritis    POSTOPERATIVE DIAGNOSIS: same     PROCEDURE: Left total Knee Arthroplasty     SURGICAL DETAILS:     APPROACH: Medial parapatellar     ANESTHESIA: Spinal plus local periarticular block    PREOPERATIVE ANTIBIOTICS: Ancef 2 g IV    TRANEXAMIC ACID: IV    TOURNIQUET TIME: 65 min @300 mmHg     ESTIMATED BLOOD LOSS: 100 cc     SPECIMENS: None    IMPLANTS:   /Brand: Darlene triathlon  Tibial component size: 3 pressfit tritanium baseplate   Femoral component size: 3 pressfit cruciate retaining   Tibial polyethylene insert: 9 mm cruciate stabilizing   Patellar component: 32 mm asymmetric tritanium  Cement: None    DRAINS: None    LOCAL INJECTION: 1 cc Toradol 30mg/ml, 4 cc duramorph 2mg/ml, 20 cc 0.5% ropivicaine, 20 cc 0.5% lidocaine with 1:200,000 epinephrine, 15 cc preservative free normal saline     MODIFIER(S): None    COMPLICATIONS: None apparent    INDICATIONS FOR PROCEDURE: The patient has a long history of progressive knee pain, arthritis, and degeneration resulting in deformity in the left knee from predominantly medial wear and bone loss. Non-operative treatment and conservative therapeutic measures have been attempted, but have not improved or controlled the symptoms and pain that occurs during normal daily activities. Knee motion has also become limited and is restricting the patient. Total knee arthroplasty was recommended. The risks, benefits, alternatives, and potential  complications of the arthroplasty surgery were discussed with the patient in detail to include but not be limited to infection, bleeding, anesthesia risks, damage to neurovascular structures, osteolysis, aseptic loosening, instability, anterior knee pain, continued pain, iatrogenic fracture, dislocation, need for future surgery including the potential for amputation, blood clots, myocardial infarction, stroke, and death. Specific details of the surgical procedure, hospitalization, recovery, rehabilitation, and long-term precautions were also presented. Pre-operative teaching was provided. Implant/prosthesis selection was outlined, and the many options available were explained; the final choice will be made at the time of the procedure to match the anatomy and condition of the bone, ligaments, tendons, and muscles. The patient completed preoperative medical optimization and risk assessment, joint arthroplasty education, and MRSA decolonization using a universal decolonization protocol. Perioperative blood management and the potential for blood transfusion were discussed with risks and options clearly outlined.     INTRAOPERATIVE FINDINGS: Advanced tricompartmental osteoarthritis with genu varum alignment    PROCEDURE: The patient was identified in the preoperative holding area. The operative site was confirmed and marked. A sequential compression device was placed on the nonoperative leg. The risks, benefits, and alternatives to surgery were again confirmed with the patient and the patient wished to proceed. The patient was brought to the operating room and placed on the operating room table in the supine position. All bony prominences were padded. A huddle was performed with the patient and all vital surgical team members to confirm the correct operative site, procedure, anesthesia type, and operative plan with the patient. After anesthesia was performed, a tourniquet was applied to the upper thigh of the operative  leg. A full knee exam was performed once anesthesia was in full effect. Intravenous antibiotic prophylaxis was given and confirmed with the anesthesia team.     The operative leg was prepped and draped in the usual sterile fashion. A surgical time out was performed immediately preceding the incision with all personnel in the operating room to confirm patient identity, the correct operative site and extremity, correct radiographic studies, availability of appropriate surgical equipment and agreement on the planned procedure. The operative knee was elevated and exsanguinated using an esmarch and the tourniquet was inflated. The knee was exposed using a limited anterior-midline skin incision. Dissection was carried down through skin and subcutaneous tissue to the extensor mechanism with a scalpel. A medial parapatellar arthrotomy was made to enter the knee space sharply. A large amount of normal appearing joint fluid was encountered and suctioned. The synovium was thickened, hypertrophic, and inflamed. A partial synovectomy was performed for exposure, and the medial and lateral gutters were cleared of scar and synovial reflections. The superficial medial collateral ligament was carefully elevated off osteophytes which were then removed with a rongeur and osteotome. Degenerative meniscal remnants were excised medially and laterally. The patellar synovial reflections were released and the patella exposed to reveal complete wear through the articular surface. The trochlea demonstrated similar severe wear. The patella was then subluxed laterally. The knee was then flexed up to 90 degrees.     Assessment of the knee joint revealed severe end-stage articular damage with no remaining medial weight bearing cartilage. The medial compartment was severely eburnated with bone loss on the medial tibia and medial femoral condyle, resulting in the varus deformity. Osteophytes were removed from the notch. The ACL was resected.  Osteophytes were then further debrided from the femur and the tibia.     Attention was then turned to the femur. The medullary cavity of the femur was entered anterior-medial to the intra-condylar notch above the PCL with a 5/6th inch step drill. The femoral canal was over-reamed, irrigated, and suctioned to prevent fat embolization. An intramedullary alignment system was then placed, fixed to the femur with pins, and the distal femoral osteotomy was made in 5 degrees of valgus with an 8 mm resection. Attention was then turned to the tibia. Retractors were placed medially and laterally to protect the collateral ligaments and a Vijay retractor was placed around the PCL in the intra-condylar notch. The tibia was then subluxed anteriorly for wide exposure. An extramedullary alignment system was then placed and the proximal tibial osteotomy was made measuring 1-2 mm off the most affected side and 9-10 mm off the unaffected side with approximately 3 degrees posterior slope. The guide was also confirmed to be perpendicular to the tibial axis prior to the osteotomy. A sizing guide was then used to select the tibial component size. The knee was then brought to extension and the appropriate sized spacer block was placed. This brought the knee to full extension with excellent medial and lateral balance.     The flexion gap was then inspected and measured both visually and with a tensioner device to assess the medial and lateral flexion balance. A femur posterior reference guide was placed in 6 degrees of external rotation in accordance with the soft tissue balance. This resulted in symmetric flexion and extension gaps and was verified against the epicondylar axis and Danielle's line. The femur was sized using a posterior referencing guide and, using the previously determined degrees of rotation, drill holes were made for the cutting block. The 4 in 1 cutting block was impacted into place and secured. Cuts were completed  using a saw with the collaterals protected by Z-retractors. Care was taken to preserve the PCL. A lamina  was placed with the knee in 90 degrees of flexion and a large curved osteotome, rongeur, and curettes were utilized to clear posterior osteophytes, loose bodies and meniscal remnants.     A femoral trial implant was placed; excellent fit was confirmed. The medial-lateral and anterior-posterior dimensions were checked; anatomic fit and coverage were achieved.The proximal tibia baseplate trial was placed with its mid-point at the junction of medial one-third and lateral two-thirds of the tibial tubercle and pinned to this fixed position. A trial reduction was then performed. Trial reduction demonstrated the knee achieved full extension with excellent stability and range of motion, and no tendency toward instability with varus-valgus stress at full extension, mid-flexion, or 90 degrees of flexion. The PCL was also found to be appropriately tensioned with normal posterior tibial excursion.     Next, attention was turned to the patella. It was measured and the posterior 9-10 mm was resected leaving a healthy remnant with greater than 11mm thickness. The patella was sized with the asymmetric guide, and drill holes were made. A trial button was placed and tracking of the patella and the entire knee trial was tested. The patella tracking was excellent throughout range of motion with no instability. Punches and drills were then placed through the trials to accommodate the final implants. All trials were removed.     The wound was copiously lavaged with a pulse irrigation/suction system. The posterior recess of the knee and areas of known bleeding were treated with the electrocautery to reduce post-operative bleeding. A pain cocktail was injected into the jung-articular tissues. The cut bone surfaces were then irrigated again, suctioned, and dried. The final implants were impacted into place, tibia followed by  tibial polyethylene followed by femur followed by patella. The tourniquet was released and no excessive bleeding was encountered. Synovial bleeding was further treated with the electrocautery until adequate hemostasis was obtained.     The wound was again irrigated with dilute betadine solution followed by saline. The extensor mechanism and capsule was then anatomically closed with interrupted #1 Vicryl suture and a running #2 Stratafix stitch. Knee stability and range of motion with the capsule closed was excellent, and range of motion was 0 to 135 degrees without excessive stress on the repair. Instrument and sponge count was completed and confirmed correct. Deep and superficial subcutaneous tissue was closed with interrupted 2-0 Vicryl suture. A running 3-0 Monocryl subcuticular stitch was used to re-approximate the skin edges followed by skin glue adhesive to seal the wound. A silver impregnated dressing was then placed, followed by a sequential compression device to the operative limb. The patient was sufficiently recovered from anesthesia, transferred to a hospital bed and taken to the PACU in stable condition.     One gram (1000 mg) of intravenous tranexamic acid was administered prior to incision. A second one gram (1000 mg) intravenous dose was given prior to wound closure.    No apparent complications occurred during the procedure. Instrument, sponge and needle counts were correct x 2.     The patient underwent risk stratification preoperatively and aspirin was chosen for DVT prophylaxis. Delay in starting chemical prophylaxis for 23 hours from surgical incision was over concerns for hematoma formation and wound related issues.     POST OPERATIVE PLAN:   Weight bearing as tolerated with knee range of motion as tolerated   Pain control with PO/IV meds   Adductor canal catheter placement by Anesthesia Pain Management Team in PACU.   23 hours perioperative antibiotic prophylaxis.  Cefadroxil 500 twice daily  x7 days postop.  PT/OT for mobilization and medical equipment needs   Keep silver dressing in place for 7 days post op. Change dressing only if saturated.   SCDs to bilateral lower extremities   Social work for discharge planning needs   Follow up in 3 weeks for post operative wound check with XR AP and lateral of operative knee.

## 2022-06-22 NOTE — ANESTHESIA PROCEDURE NOTES
Spinal Block      Patient reassessed immediately prior to procedure    Patient location during procedure: OR  Indication:at surgeon's request  Performed By  CRNA/CAA: Radha Veras CRNASRNA: Dahiana Carnes SRNA  Preanesthetic Checklist  Completed: patient identified, IV checked, site marked, risks and benefits discussed, surgical consent, monitors and equipment checked, pre-op evaluation and timeout performed  Spinal Block Prep:  Patient Position:sitting  Sterile Tech:cap, gloves, sterile barriers and mask  Prep:Chloraprep  Patient Monitoring:blood pressure monitoring, continuous pulse oximetry and EKG  Spinal Block Procedure  Approach:midline  Guidance:landmark technique and palpation technique  Location:L4-L5  Needle Type:Quincke  Needle Gauge:22 G  Placement of Spinal needle event:cerebrospinal fluid aspirated  Paresthesia: no  Fluid Appearance:clear  Medications: bupivacaine (MARCAINE) 0.5 % injection, 1.8 mL  Med Administered at 6/22/2022 9:59 AM   Post Assessment  Patient Tolerance:patient tolerated the procedure well with no apparent complications  Complications no  Additional Notes  Procedure:  Pt assisted to sitting position, with legs in position of comfort over side of bed.  Pt. instructed in optimal spine presentation, the spine was prepped/ Draped and the skin at insertion site was anesthetized with 1% Lidocaine 2 ml.  The spinal needle was then advanced until CSF flow was obtained and LA was injected:

## 2022-06-22 NOTE — BRIEF OP NOTE
TOTAL KNEE ARTHROPLASTY  Progress Note    Julieta Frey [facility]  6/22/2022    Pre-op Diagnosis:   Primary osteoarthritis of left knee [M17.12]       Post-Op Diagnosis Codes:     * Primary osteoarthritis of left knee [M17.12]    Procedure/CPT® Codes:  NM TOTAL KNEE ARTHROPLASTY [98311]      Procedure(s):  TOTAL KNEE ARTHROPLASTY LEFT    Surgeon(s):  Shon Murillo MD    Anesthesia: Spinal    Staff:   Circulator: Marie Coy RN  Scrub Person: Ana Rosa Boyle RN  Nursing Assistant: José Miguel Dan  Assistant: Amando Mondragon PA  Assistant: Amando Mondragon PA      Estimated Blood Loss: 100ml    Urine Voided: * No values recorded between 6/22/2022  9:54 AM and 6/22/2022 11:27 AM *    Specimens:                None          Drains: * No LDAs found *    Findings: Advanced tricompartmental arthritis with genu varum alignment        Complications: None apparent    Assistant: Amando Mondragon PA  was responsible for performing the following activities: Retraction, Suction, Irrigation, Suturing, Closing and Placing Dressing and their skilled assistance was necessary for the success of this case.    Shon Murillo MD     Date: 6/22/2022  Time: 11:54 EDT
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
## 2022-06-22 NOTE — CASE MANAGEMENT/SOCIAL WORK
Discharge Planning Assessment  [facility]     Patient Name: Julieta Holt  MRN: [id number]  Today's Date: 6/22/2022    Admit Date: 6/22/2022     Discharge Needs Assessment    No documentation.                Discharge Plan     Row Name 06/22/22 1543       Plan    Plan Home with family assistance, rolling walker ([employer]) and [facility] OP PT in [place]    Patient/Family in Agreement with Plan yes    Plan Comments Same day surgery. Patient in need of a rolling walker for home - DME provided by [employer]. Patient requesting OP PT with [facility] in [place] as she has been there in the past. Staff will call patient and schedule OP PT appointment with her this evening. All information in EPIC. Family to provide transportation to home.    Final Discharge Disposition Code 01 - home or self-care              Continued Care and Services - Admitted Since 6/22/2022     Durable Medical Equipment Coordination complete.    Service Provider Request Status Selected Services Address Phone Fax Patient Preferred    [facility] Durable Medical Equipment [address] [address] [address] [phone] --          Therapy Coordination complete.    Service Provider Request Status Selected Services Address Phone Fax Patient Preferred    [facility] PHYSICAL THERAPY [place]   Selected Outpatient Rehabilitation [address] [facility] [id number] [phone] --              Expected Discharge Date and Time     Expected Discharge Date Expected Discharge Time    Jun 22, 2022          Demographic Summary    No documentation.                Functional Status    No documentation.                Psychosocial    No documentation.                Abuse/Neglect    No documentation.                Legal    No documentation.                Substance Abuse    No documentation.                Patient Forms    No documentation.                   Sherri  Laci, RN

## 2022-06-22 NOTE — ANESTHESIA PROCEDURE NOTES
Adductor Catheter      Patient reassessed immediately prior to procedure    Patient location during procedure: post-op  Reason for block: at surgeon's request and post-op pain management  Performed by  CRNA/CAA: Andrés Campos CRNA  Assisted by: Tiera Shelton RNSRNA: Dahiana Carnes SRNA  Preanesthetic Checklist  Completed: patient identified, IV checked, site marked, risks and benefits discussed, surgical consent, monitors and equipment checked, pre-op evaluation and timeout performed  Prep:  Pt Position: supine  Sterile barriers:cap, gloves, mask and sterile barriers  Prep: ChloraPrep  Patient monitoring: blood pressure monitoring, continuous pulse oximetry and EKG  Procedure    Sedation: no  Performed under: spinal  Guidance:ultrasound guided  Images:still images obtained, printed/placed on chart    Laterality:left  Block Type:adductor canal block  Injection Technique:catheter  Needle Type:Tuohy and echogenic  Needle Gauge:18 G  Resistance on Injection: none  Catheter Size:20 G (20g)  Cath Depth at skin: 12 cm    Medications Used: bupivacaine PF (MARCAINE) 0.25 % injection, 30 mL      Post Assessment  Injection Assessment: negative aspiration for heme, incremental injection and no paresthesia on injection  Patient Tolerance:comfortable throughout block  Complications:no  Additional Notes  Procedure:             The pt was placed in the Supine position.  The Insertion site was  prepped and Draped in sterile fashion.  The pt was anesthetized with  IV Sedation( see meds).  Skin and cutaneous tissue was infiltrated and anesthetized with 1% Lidocaine 3 mls via a 25g needle.  A BBraun 4 inch 18g echogenic needle was then  inserted approximately midline, mid-thigh and advanced In-plane with Ultrasound guidance.  Normal Saline PSF was utilized for hydrodissection of tissue.  The Vastus medialis and Sartorius muscle where visualized and the needle tip was placed in the adductor canal,  lateral to the femoral artery.   LA injection spread was visualized, injection was incremental 1-5ml, injection pressure was normal or little, no intraneural injection, no vascular injection.  LA dose was injected thru the needle(see dose above).  A BBraun 20g wire stylet catheter was placed via the needle with ultrasound visualization and confirmation with NS fluid bolus. The catheter insertion site was sealed with exofin tissue adhesive. The labeled catheter was then coiled and secured to skin with benzoin,  steristrips and CHG transparent dressing.  Appropriate labels were applied.  Thank you.

## 2022-06-22 NOTE — NURSING NOTE
Patient urinated and ate a boxed lunch before discharge. Patient is alert and oriented. P.T. and xray completed.
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
## 2022-06-22 NOTE — ANESTHESIA POSTPROCEDURE EVALUATION
Patient: Julieta Frey [facility]    Procedure Summary     Date: 06/22/22 Room / Location:  [facility] OR  /  [facility] OR    Anesthesia Start: 0954 Anesthesia Stop:     Procedure: TOTAL KNEE ARTHROPLASTY LEFT (Left Knee) Diagnosis:       Primary osteoarthritis of left knee      (Primary osteoarthritis of left knee [M17.12])    Surgeons: Shon Murillo MD Provider: Sebastian Perkins MD    Anesthesia Type: spinal ASA Status: 3          Anesthesia Type: spinal    Vitals  Vitals Value Taken Time   /55 06/22/22 1230   Temp 98.3 °F (36.8 °C) 06/22/22 1230   Pulse 72 06/22/22 1236   Resp 18 06/22/22 1230   SpO2 100 % 06/22/22 1236   Vitals shown include unvalidated device data.        Post Anesthesia Care and Evaluation    Patient location during evaluation: PACU  Patient participation: complete - patient participated  Level of consciousness: awake and alert  Pain management: adequate    Airway patency: patent  Anesthetic complications: No anesthetic complications  PONV Status: none  Cardiovascular status: hemodynamically stable and acceptable  Respiratory status: nonlabored ventilation, acceptable and nasal cannula  Hydration status: acceptable

## 2022-06-22 NOTE — ANESTHESIA PREPROCEDURE EVALUATION
Anesthesia Evaluation     Patient summary reviewed and Nursing notes reviewed   history of anesthetic complications: PONV  NPO Solid Status: > 8 hours  NPO Liquid Status: > 2 hours           Airway   Mallampati: II  TM distance: >3 FB  Neck ROM: full  No difficulty expected  Dental    (+) poor dentition    Pulmonary - normal exam    breath sounds clear to auscultation  (+) COPD (Controlled) mild,   Cardiovascular - normal exam    ECG reviewed  Rhythm: regular  Rate: normal    (+) hypertension, dysrhythmias (LBBB),     ROS comment: 6/22 Stress Echo:  · Myocardial perfusion imaging indicates a normal myocardial perfusion study with no evidence of ischemia.  · Left ventricular ejection fraction is hyperdynamic (Calculated EF > 70%).  · Impressions are consistent with a low risk study.    Neuro/Psych  (+) seizures (Remote history),    GI/Hepatic/Renal/Endo    (+) obesity,   diabetes mellitus type 2,     Musculoskeletal     Abdominal    Substance History      OB/GYN          Other   arthritis,                      Anesthesia Plan    ASA 3     spinal     (Left adductor canal block/catheter with arrow pump for post-operative analgesia per request of Dr. Murillo.  Time out performed to verify patient, surgeon, and surgical site.)  intravenous induction     Anesthetic plan, risks, benefits, and alternatives have been provided, discussed and informed consent has been obtained with: patient.    Plan discussed with CRNA.        CODE STATUS:

## 2022-06-24 NOTE — PROGRESS NOTES
MARIAA Singh    Nerve Cath Post Op Call    Patient Name: Julieta Holt  :  1950  MRN:  [id number]  Date of Discharge: 2022    Nerve Cath Post Op Call:    Catheter Plan:Patient called, No answer. Message left to call [facility] pain service for any questions or complaints  Patient/Family instructed to call ON CALL anesthesia provider for any questions or problems.  Patient Follow Up:

## 2022-06-27 ENCOUNTER — TREATMENT (OUTPATIENT)
Dept: PHYSICAL THERAPY | Facility: CLINIC | Age: 72
End: 2022-06-27

## 2022-06-27 DIAGNOSIS — Z96.652 HX OF TOTAL KNEE ARTHROPLASTY, LEFT: Primary | ICD-10-CM

## 2022-06-27 DIAGNOSIS — M25.562 CHRONIC PAIN OF LEFT KNEE: ICD-10-CM

## 2022-06-27 DIAGNOSIS — M25.662 DECREASED ROM OF LEFT KNEE: ICD-10-CM

## 2022-06-27 DIAGNOSIS — G89.29 CHRONIC PAIN OF LEFT KNEE: ICD-10-CM

## 2022-06-27 PROCEDURE — 97162 PT EVAL MOD COMPLEX 30 MIN: CPT | Performed by: PHYSICAL THERAPIST

## 2022-06-27 PROCEDURE — 97110 THERAPEUTIC EXERCISES: CPT | Performed by: PHYSICAL THERAPIST

## 2022-06-27 NOTE — PROGRESS NOTES
Physical Therapy Initial Evaluation and Plan of Care    Patient: Julieta Frey [facility]   : 1950  Diagnosis/ICD-10 Code:  Hx of total knee arthroplasty, left [Z96.652]  Referring practitioner: Shon Murillo MD  Date of Initial Visit: 2022  Today's Date: 2022  Patient seen for 1 session         Visit Diagnoses:    ICD-10-CM ICD-9-CM   1. Hx of total knee arthroplasty, left  Z96.652 V43.65   2. Chronic pain of left knee  M25.562 719.46    G89.29 338.29   3. Decreased ROM of left knee  M25.662 719.56         Subjective Questionnaire: LEFS: 74%      Subjective Evaluation    History of Present Illness  Date of surgery: 2022  Mechanism of injury: Pt has suffered from chronic left knee pain for the past year. She was referred to MD Murillo around three months ago and received a cortisone shot with poor response.  The patient was scheduled for surgery for a left TKA on 2022, with no complications.  She has now been scheduled for therapy for improved mobility and function per protocol. The patient will follow up with her MD in two weeks.        Patient Occupation:  for [employer] Quality of life: good    Pain  Current pain ratin  At best pain ratin  At worst pain ratin  Location: left knee  Quality: dull ache  Relieving factors: heat  Aggravating factors: movement, stairs, standing, ambulation, squatting and repetitive movement  Progression: improved    Hand dominance: right    Patient Goals  Patient goals for therapy: decreased edema, decreased pain, improved balance, increased motion, increased strength, independence with ADLs/IADLs, return to sport/leisure activities and return to work             Objective          Palpation     Additional Palpation Details  Incision tenderness    Neurological Testing     Sensation     Knee   Left Knee   Intact: light touch    Right Knee   Intact: light touch     Active Range of Motion   Left Knee   Flexion: 51 degrees     Right  Knee   Flexion: 135 degrees   Extension: 0 degrees     Additional Active Range of Motion Details  Left knee lacks 17 degrees from full ext    Strength/Myotome Testing     Additional Strength Details  Left ankle 4/5 grossly    Ambulation     Comments   Pt amb with use of rw with antalgic gait and decreased stance on left          Assessment & Plan     Assessment  Impairments: abnormal coordination, abnormal gait, abnormal muscle firing, abnormal or restricted ROM, activity intolerance, impaired balance, impaired physical strength, lacks appropriate home exercise program, pain with function, safety issue and weight-bearing intolerance  Functional Limitations: walking, uncomfortable because of pain, standing and unable to perform repetitive tasks  Assessment details: Pt is a 70 y/o female referred to therapy for rehab following a left TKA.  Pt presents with decreased knee ROM, increased pain and impaired gait.  Therapy will follow per protocol for improved knee ROM and strength.  Prognosis: good    Goals  Plan Goals: STG 6 weeks    1 Pt will be instructed in a HEP.  2 Pt will demonstrated left knee ROM 7-100 degrees on more.  3 Pt will demonstrate 4-/5 gross knee strength.    LTG 12 weeks    1 Pt will improve her left knee ROM to 3-115 degrees.  2 Pt will improve her LEFs to less than 30%.  3 Pt will amb without an AD with increased velocity and symmetry.  4 Pt will report pain no greater than 2/10 with increased walking and standing.    Plan  Therapy options: will be seen for skilled therapy services  Planned modality interventions: cryotherapy, thermotherapy (hydrocollator packs) and ultrasound  Planned therapy interventions: ADL retraining, balance/weight-bearing training, flexibility, functional ROM exercises, gait training, home exercise program, IADL retraining, joint mobilization, manual therapy, motor coordination training, neuromuscular re-education, postural training, soft tissue mobilization, strengthening,  stretching and therapeutic activities  Frequency: 3x week  Duration in weeks: 12  Treatment plan discussed with: patient  Plan details: Will follow for optimal gains.  Moderate Evaluation  72423  Re-evaluation   98795  Therapeutic exercise  08058  Therapeutic activity    64107  Neuromuscular re-education   67932  Manual therapy   65516  Gait training  12064  Unattended e-stim (Medicaid/Medicare)     Moist heat/cryotherapy 91658   Ultrasound   11530              Timed:         Manual Therapy:         mins  30711;     Therapeutic Exercise:      10   mins  21588;     Neuromuscular Linda:        mins  05423;    Therapeutic Activity:          mins  57398;     Gait Training:           mins  51918;     Ultrasound:          mins  86144;    Ionto                                   mins   09674  Self Care                            mins   61185  Canalith Repos         mins 38992      Un-Timed:  Electrical Stimulation:         mins  88334 ( );  Dry Needling          mins self-pay  Traction          mins 09079  Low Eval          Mins  32641  Mod Eval     30     Mins  66447  High Eval                            Mins  88848        Timed Treatment:   10   mins   Total Treatment:     40   mins          PT: Paul Reno PT     License Number: [id number]  Electronically signed by Paul Reno PT, 06/27/22, 10:25 AM EDT    Certification Period: 6/27/2022 thru 9/24/2022  I certify that the therapy services are furnished while this patient is under my care.  The services outlined above are required by this patient, and will be reviewed every 90 days.         Physician Signature:__________________________________________________    PHYSICIAN: Shon Murillo MD  NPI: [id number]                                      DATE:      Please sign and return via fax to [email or web address] . Thank you, [facility].

## 2022-06-28 ENCOUNTER — TRANSCRIBE ORDERS (OUTPATIENT)
Dept: ADMINISTRATIVE | Facility: HOSPITAL | Age: 72
End: 2022-06-28

## 2022-06-28 DIAGNOSIS — Z87.891 PERSONAL HISTORY OF TOBACCO USE, PRESENTING HAZARDS TO HEALTH: Primary | ICD-10-CM

## 2022-07-01 ENCOUNTER — TREATMENT (OUTPATIENT)
Dept: PHYSICAL THERAPY | Facility: CLINIC | Age: 72
End: 2022-07-01

## 2022-07-01 DIAGNOSIS — M25.562 CHRONIC PAIN OF LEFT KNEE: ICD-10-CM

## 2022-07-01 DIAGNOSIS — M25.662 DECREASED ROM OF LEFT KNEE: ICD-10-CM

## 2022-07-01 DIAGNOSIS — G89.29 CHRONIC PAIN OF LEFT KNEE: ICD-10-CM

## 2022-07-01 DIAGNOSIS — Z96.652 HX OF TOTAL KNEE ARTHROPLASTY, LEFT: Primary | ICD-10-CM

## 2022-07-01 PROCEDURE — 97110 THERAPEUTIC EXERCISES: CPT | Performed by: PHYSICAL THERAPIST

## 2022-07-01 NOTE — PROGRESS NOTES
Physical Therapy Daily Treatment Note      Patient: Julieta Frey [facility]   : 1950  Referring practitioner: Shon Murillo MD  Date of Initial Visit: Type: THERAPY  Noted: 2022  Today's Date: 2022  Patient seen for 2 sessions       Visit Diagnoses:    ICD-10-CM ICD-9-CM   1. Hx of total knee arthroplasty, left  Z96.652 V43.65   2. Chronic pain of left knee  M25.562 719.46    G89.29 338.29   3. Decreased ROM of left knee  M25.662 719.56       Subjective:  Patient arrives to therapy w/ reports of /10 left knee pain.  Pt states her leg is sore from doing some of her exercises, otherwise she reports of no new complaints/ concerns.      Objective   See Exercise, Manual, and Modality Logs for complete treatment.       Assessment/Plan:  Patient responded well to today's session w/ reports of slight decrease in knee pain following, 3/10.  Treatment initiated w/ therex as listed for improved left knee ROM and improved left LE strength, f/b gait training w/ RW and weight shifts in parallel bars.  Pt was provided w/ cues and demonstration while performing exercise to maintain form and for improved quad facilitation.  Pt continues to display bruising on left LE, and incisional area was covered with bandaging.  Pt educated to monitor around incisional area, w/ pt verbalizing understanding.  Cryotherapy applied at conclusion.  Pt continues to benefit from therapy services, and will be progressed as tolerated to address goals, reduce pain, and restore mobility.  No signs of distress or adverse reactions observed during, and/ or following tx.  Continue w/ PT's POC.      Timed:         Manual Therapy:         mins  52630;     Therapeutic Exercise:    36     mins  47154;     Neuromuscular Linda:        mins  52616;    Therapeutic Activity:          mins  90098;     Gait Trainin      mins  89677;     Ultrasound:          mins  18296;    Ionto                                   mins   37789  Self  Care                            mins   95565  Canalith Repos         mins 05623      Un-Timed:  Electrical Stimulation:         mins  79201 ( );  Dry Needling          mins self-pay  Traction          mins 26450      Timed Treatment:  42    mins   Total Treatment:     50   mins (CP: x 8min)    Sophie Lazaro. Shelley, HERI  [place] License: [id number]

## 2022-07-06 ENCOUNTER — TREATMENT (OUTPATIENT)
Dept: PHYSICAL THERAPY | Facility: CLINIC | Age: 72
End: 2022-07-06

## 2022-07-06 DIAGNOSIS — G89.29 CHRONIC PAIN OF LEFT KNEE: ICD-10-CM

## 2022-07-06 DIAGNOSIS — Z96.652 HX OF TOTAL KNEE ARTHROPLASTY, LEFT: Primary | ICD-10-CM

## 2022-07-06 DIAGNOSIS — M25.562 CHRONIC PAIN OF LEFT KNEE: ICD-10-CM

## 2022-07-06 DIAGNOSIS — M25.662 DECREASED ROM OF LEFT KNEE: ICD-10-CM

## 2022-07-06 PROCEDURE — 97116 GAIT TRAINING THERAPY: CPT | Performed by: PHYSICAL THERAPIST

## 2022-07-06 PROCEDURE — 97110 THERAPEUTIC EXERCISES: CPT | Performed by: PHYSICAL THERAPIST

## 2022-07-06 NOTE — PROGRESS NOTES
Physical Therapy Daily Treatment Note      Patient: Julieta Frey [facility]   : 1950  Referring practitioner: Shon Murillo MD  Date of Initial Visit: Type: THERAPY  Noted: 2022  Today's Date: 2022  Patient seen for 3 sessions       Visit Diagnoses:    ICD-10-CM ICD-9-CM   1. Hx of total knee arthroplasty, left  Z96.652 V43.65   2. Chronic pain of left knee  M25.562 719.46    G89.29 338.29   3. Decreased ROM of left knee  M25.662 719.56       Subjective   Patient reports of no pain in her left knee prior to treatment session but she is having 6/10 pain in her left hip. Patient states that she done good following last treatment session. Patient reports that she has a follow up appointment with her doctor on 2022. Patient states that she removed the wrap from her left leg due to it irritating the back of her left knee.    Objective   See Exercise, Manual, and Modality Logs for complete treatment.       Assessment/Plan  Patient's left knee is bandaged with no redness noted just bruising on the left calf muscle. Patient tolerated treatment session well with rest breaks taken as needed by the patient. Educated patient to perform therex per her tolerance, patient verbalized understanding. No adverse reactions with treatment session. New therex added per the patient's tolerance, patient demonstrated and understood new therex with no increase in pain noted. Increased pain noted following treatment session, 1/10 pain. Continue per PT's POC, progress exercises per the patient's tolerance.     Timed:         Manual Therapy:         mins  88010;     Therapeutic Exercise:    41     mins  23956;     Neuromuscular Linda:        mins  60898;    Therapeutic Activity:          mins  71324;     Gait Trainin     mins  06507;     Ultrasound:          mins  19292;    Ionto                                   mins   73457  Self Care                            mins   31326  [facility]          mins 31334      Un-Timed:  Electrical Stimulation:         mins  24746 ( );  Dry Needling          mins self-pay  Traction          mins 49670      Timed Treatment:   53   mins   Total Treatment:     53   mins    Marybeth Marquez, [facility]  [place] License: [id number]

## 2022-07-08 ENCOUNTER — TREATMENT (OUTPATIENT)
Dept: PHYSICAL THERAPY | Facility: CLINIC | Age: 72
End: 2022-07-08

## 2022-07-08 DIAGNOSIS — M25.662 DECREASED ROM OF LEFT KNEE: ICD-10-CM

## 2022-07-08 DIAGNOSIS — Z96.652 HX OF TOTAL KNEE ARTHROPLASTY, LEFT: Primary | ICD-10-CM

## 2022-07-08 DIAGNOSIS — G89.29 CHRONIC PAIN OF LEFT KNEE: ICD-10-CM

## 2022-07-08 DIAGNOSIS — M25.562 CHRONIC PAIN OF LEFT KNEE: ICD-10-CM

## 2022-07-08 PROCEDURE — 97110 THERAPEUTIC EXERCISES: CPT | Performed by: PHYSICAL THERAPIST

## 2022-07-08 NOTE — PROGRESS NOTES
Physical Therapy Daily Treatment Note      Patient: Julieta Frey [facility]   : 1950  Referring practitioner: Shon Murillo MD  Date of Initial Visit: Type: THERAPY  Noted: 2022  Today's Date: 2022  Patient seen for 4 sessions       Visit Diagnoses:    ICD-10-CM ICD-9-CM   1. Hx of total knee arthroplasty, left  Z96.652 V43.65   2. Chronic pain of left knee  M25.562 719.46    G89.29 338.29   3. Decreased ROM of left knee  M25.662 719.56       Subjective   Patient reports that she was unable to sleep at night due to pain in her left knee. Patient states that she is working on her home exercises. Patient reports that she is having 10/10 pain in her left knee.     Objective   See Exercise, Manual, and Modality Logs for complete treatment.       Assessment/Plan  Patient tolerated treatment session well with rest breaks taken as needed by the patient. Educated patient to perform therex per her tolerance, patient verbalized understanding. No adverse reactions with modalities or treatment session. New therex added per the patient's tolerance, patient demonstrated and understood new therex with no increase in pain noted. Treatment session consisted of exercises to improve strength and ROM to the left knee. Patient requested not to apply ice to the left knee post treatment session. Continue per PT's POC, progress exercises per the patient's tolerance.      Timed:         Manual Therapy:         mins  46679;     Therapeutic Exercise:    39    mins  11543;     Neuromuscular Linda:        mins  61501;    Therapeutic Activity:          mins  26954;     Gait Training:      10     mins  85417;     Ultrasound:          mins  12275;    Ionto                                   mins   85349  Self Care                            mins   52036  Canalith Repos         mins 83475      Un-Timed:  Electrical Stimulation:         mins  14371 ( );  Dry Needling          mins self-pay  Traction          mins  36591      Timed Treatment:  49    mins   Total Treatment:     49   mins    Marybeth Marquez, PTA  [place] License: [id number]

## 2022-07-11 ENCOUNTER — TREATMENT (OUTPATIENT)
Dept: PHYSICAL THERAPY | Facility: CLINIC | Age: 72
End: 2022-07-11

## 2022-07-11 DIAGNOSIS — Z96.652 HX OF TOTAL KNEE ARTHROPLASTY, LEFT: Primary | ICD-10-CM

## 2022-07-11 DIAGNOSIS — M25.562 CHRONIC PAIN OF LEFT KNEE: ICD-10-CM

## 2022-07-11 DIAGNOSIS — M25.662 DECREASED ROM OF LEFT KNEE: ICD-10-CM

## 2022-07-11 DIAGNOSIS — G89.29 CHRONIC PAIN OF LEFT KNEE: ICD-10-CM

## 2022-07-11 PROCEDURE — 97110 THERAPEUTIC EXERCISES: CPT | Performed by: PHYSICAL THERAPIST

## 2022-07-11 PROCEDURE — 97116 GAIT TRAINING THERAPY: CPT | Performed by: PHYSICAL THERAPIST

## 2022-07-11 NOTE — PROGRESS NOTES
Physical Therapy Daily Treatment Note      Patient: Julieta Frey [facility]   : 1950  Referring practitioner: Shon Murillo MD  Date of Initial Visit: Type: THERAPY  Noted: 2022  Today's Date: 2022  Patient seen for 5 sessions       Visit Diagnoses:    ICD-10-CM ICD-9-CM   1. Hx of total knee arthroplasty, left  Z96.652 V43.65   2. Chronic pain of left knee  M25.562 719.46    G89.29 338.29   3. Decreased ROM of left knee  M25.662 719.56       Subjective Evaluation    History of Present Illness    Subjective comment: Pt reports having 3/10 pain today.       Objective   See Exercise, Manual, and Modality Logs for complete treatment.       Assessment & Plan     Assessment    Assessment details: Tx today consisted of exercises for improved leg mobility and stability per TKA protocol; followed by pre gait and GT at home.  Pt demonstrated good effort yet needed increased encouragement with knee flexion and extension exercises. Pt did demonstrate improved gait mech following session and responded well to ice post tx.  Pt reported 2/10 post pain.    Plan  Plan details: Will follow progressing leg stability and decreased pain in order to improve ADLs at home.          Timed:         Manual Therapy:         mins  50197;     Therapeutic Exercise:    31     mins  72148;     Neuromuscular Linda:        mins  71507;    Therapeutic Activity:          mins  09037;     Gait Trainin     mins  16772;     Ultrasound:          mins  93387;    Ionto                                   mins   47063  Self Care                            mins   27173  Canalith Repos         mins 57088      Un-Timed:  Electrical Stimulation:         mins  04996 ( );  Dry Needling          mins self-pay  Traction          mins 86368      Timed Treatment:   43   mins   Total Treatment:     49   Mins(6 min ice)    Paul Reno PT  [place] License: [id number]      Electronically signed by Paul Reno PT,  07/11/22, 10:27 AM EDT

## 2022-07-12 ENCOUNTER — OFFICE VISIT (OUTPATIENT)
Dept: ORTHOPEDIC SURGERY | Facility: CLINIC | Age: 72
End: 2022-07-12

## 2022-07-12 VITALS — TEMPERATURE: 97.3 F

## 2022-07-12 DIAGNOSIS — Z09 SURGERY FOLLOW-UP: Primary | ICD-10-CM

## 2022-07-12 DIAGNOSIS — Z96.652 S/P TOTAL KNEE ARTHROPLASTY, LEFT: ICD-10-CM

## 2022-07-12 PROCEDURE — 99024 POSTOP FOLLOW-UP VISIT: CPT | Performed by: PHYSICIAN ASSISTANT

## 2022-07-12 RX ORDER — FUROSEMIDE 20 MG/1
TABLET ORAL
COMMUNITY
Start: 2022-06-29

## 2022-07-12 NOTE — PROGRESS NOTES
[facility] [place] Orthopaedic Surgery Clinic Note        Subjective     CC: Post-op (3 weeks s/p Total Knee Arthroplasty Left 6/22/22)      LATOYA Holt is a 71 y.o. female.  Patient presents a 3-week status post left total knee arthroplasty.  She reports pain is improving.  She is just taking her DVT prophylaxis aspirin for pain.  She is doing outpatient PT.  Reporting pain 3/10.    Overall, patient's symptoms are improved    ROS:    Constiutional:Pt denies fever, chills, nausea, or vomiting.  MSK:as above        Objective      Past Medical History  Past Medical History:   Diagnosis Date   • Arthritis    • Cardiac arrhythmia due to congenital heart disease    • Chest pain    • Colitis    • COPD (chronic obstructive pulmonary disease) ([facility])    • Depression    • Elevated cholesterol    • Heart murmur    • High blood pressure    • High cholesterol    • History of transfusion     PATIENT DENIES REACTION   • Hypertension 6/22/2022   • Osteoarthritis    • Osteoporosis    • PONV (postoperative nausea and vomiting)    • Seizure disorder ([facility])    • Seizures ([facility])     last seizure 1960   • Sinusitis    • Urinary incontinence    • Urinary tract infection          Physical Exam  Temp 97.3 °F (36.3 °C)     There is no height or weight on file to calculate BMI.    Patient is well nourished and well developed.        Ortho Exam  Left knee exam: Anterior knee incisions healing well.  Range of motion 3-100.  Ligament stable to valgus and varus stress neurovascular intact distally imaging/Labs/EMG Reviewed:  Imaging Results (Last 24 Hours)     Procedure Component Value Units Date/Time    XR Knee 3+ View With [place] Left [[id number]] Resulted: 07/12/22 0859     Updated: 07/12/22 0909            Assessment    Assessment:  1. Surgery follow-up    2. S/P total knee arthroplasty, left        Plan:  1. Recommend over the counter anti-inflammatories for pain and/or swelling  2. Doing well status post left total knee  arthroplasty with Dr. Murillo on 6/22/2022.  Patient is doing well.  She is doing outpatient PT.  I told her that she can drive 1 month post postop.  She will return to see Dr. Murillo in 3 weeks with repeat x-rays or sooner if needed.      Ivy Camacho PA-C  07/12/22  09:43 EDT

## 2022-07-13 ENCOUNTER — TREATMENT (OUTPATIENT)
Dept: PHYSICAL THERAPY | Facility: CLINIC | Age: 72
End: 2022-07-13

## 2022-07-13 DIAGNOSIS — M25.662 DECREASED ROM OF LEFT KNEE: ICD-10-CM

## 2022-07-13 DIAGNOSIS — M25.562 CHRONIC PAIN OF LEFT KNEE: ICD-10-CM

## 2022-07-13 DIAGNOSIS — G89.29 CHRONIC PAIN OF LEFT KNEE: ICD-10-CM

## 2022-07-13 DIAGNOSIS — Z96.652 HX OF TOTAL KNEE ARTHROPLASTY, LEFT: Primary | ICD-10-CM

## 2022-07-13 PROCEDURE — 97110 THERAPEUTIC EXERCISES: CPT | Performed by: PHYSICAL THERAPIST

## 2022-07-13 PROCEDURE — 97530 THERAPEUTIC ACTIVITIES: CPT | Performed by: PHYSICAL THERAPIST

## 2022-07-13 NOTE — PROGRESS NOTES
Physical Therapy Daily Treatment Note      Patient: Julieta Frey [facility]   : 1950  Referring practitioner: Shon Murillo MD  Date of Initial Visit: Type: THERAPY  Noted: 2022  Today's Date: 2022  Patient seen for 6 sessions       Visit Diagnoses:    ICD-10-CM ICD-9-CM   1. Hx of total knee arthroplasty, left  Z96.652 V43.65   2. Chronic pain of left knee  M25.562 719.46    G89.29 338.29   3. Decreased ROM of left knee  M25.662 719.56       Subjective:  Patient states she had a f/u appointment with referring ortho yesterday, and MD was pleased with her progress.  Pt states he wants her to wean from the walker. Pain: 2/10 pre tx     Objective          Active Range of Motion   Left Knee   Flexion: 107 (AAROM) degrees   Extension: Left knee active extension: lacks 8 degrees from full extension.       See Exercise, Manual, and Modality Logs for complete treatment.       Assessment/Plan:  Patient responded well to today's session, however reported slight increase in soreness following, 3/10; no signs of distress observed.  Treatment initiated w/ LE bike w/ half revolutions for improved knee ROM f/b therex as listed and cryotherapy following.  Exercise progressed w/ LE bike initiated, as well as prone knee hang to assist w/ range of motion deficits.  Pt observed w/ good tolerance, and was provided w/ cues and demonstration for mechanics, and for max benefit.  Pt noted with improvements in knee flexion and extension ROM, as listed above.  Pt educated on importance of performing home program for max gains.  Pt verbalized understanding.  Cryotherapy applied at conclusion.  Pt continues to benefit from therapy services, and will be progressed as tolerated to address goals, reduce pain, and improve mobility. No signs of distress or adverse reactions observed during, and/ or following tx.  Continue w/ PT's POC.       Timed:         Manual Therapy:         mins  95699;     Therapeutic Exercise:      36    mins  14940;     Neuromuscular Linda:      mins  02875;    Therapeutic Activity:    10      mins  86105;     Gait Training:           mins  73471;     Ultrasound:          mins  50689;    Ionto                                   mins   68406  Self Care                            mins   23698  Canalith Repos         mins 59738      Un-Timed:  Electrical Stimulation:         mins  06428 ( );  Dry Needling          mins self-pay  Traction          mins 75366      Timed Treatment:  46    mins   Total Treatment:    54    mins (CP: x 8 min)     Sophie Lazaro. HERI Rosa  [place] License: [id number]

## 2022-07-15 ENCOUNTER — TREATMENT (OUTPATIENT)
Dept: PHYSICAL THERAPY | Facility: CLINIC | Age: 72
End: 2022-07-15

## 2022-07-15 DIAGNOSIS — Z98.890 S/P ARTHROSCOPIC PARTIAL MEDIAL MENISCECTOMY: ICD-10-CM

## 2022-07-15 DIAGNOSIS — M62.81 QUADRICEPS WEAKNESS: ICD-10-CM

## 2022-07-15 DIAGNOSIS — G89.29 CHRONIC PAIN OF LEFT KNEE: ICD-10-CM

## 2022-07-15 DIAGNOSIS — Z96.652 HX OF TOTAL KNEE ARTHROPLASTY, LEFT: Primary | ICD-10-CM

## 2022-07-15 DIAGNOSIS — M25.562 CHRONIC PAIN OF LEFT KNEE: ICD-10-CM

## 2022-07-15 DIAGNOSIS — M25.662 DECREASED ROM OF LEFT KNEE: ICD-10-CM

## 2022-07-15 PROCEDURE — 97110 THERAPEUTIC EXERCISES: CPT | Performed by: PHYSICAL THERAPIST

## 2022-07-15 PROCEDURE — 97530 THERAPEUTIC ACTIVITIES: CPT | Performed by: PHYSICAL THERAPIST

## 2022-07-15 NOTE — PROGRESS NOTES
Physical Therapy Daily Treatment Note      Patient: Julieta Frey [facility]   : 1950  Referring practitioner: Shon Murillo MD  Date of Initial Visit: Type: THERAPY  Noted: 2022  Today's Date: 7/15/2022  Patient seen for 7 sessions       Visit Diagnoses:    ICD-10-CM ICD-9-CM   1. Hx of total knee arthroplasty, left  Z96.652 V43.65   2. Chronic pain of left knee  M25.562 719.46    G89.29 338.29   3. Decreased ROM of left knee  M25.662 719.56   4. S/P arthroscopic partial medial meniscectomy  Z98.890 V45.89   5. Quadriceps weakness  M62.81 728.87       Subjective   Patient reports that she is having 6/10 pain in the left knee. Patient states that she is working on her home exercises. Patient reports that she has increased pain in her left knee in the evening.    Objective   See Exercise, Manual, and Modality Logs for complete treatment.       Assessment/Plan  Patient tolerated treatment session well with rest breaks taken as needed by the patient. Educated patient to perform therex per her tolerance, patient verbalized understanding. No adverse reactions with treatment session. Treatment session consisted of exercises to improve strength and ROM to the left knee. Reps increased on several exercises with no increase in pain noted. Decrease in pain noted following treatment session, 5/10 pain. Continue per PT's POC, progress exercises per the patient's tolerance.     Timed:         Manual Therapy:         mins  15625;     Therapeutic Exercise:    36     mins  08139;     Neuromuscular Linda:        mins  76204;    Therapeutic Activity:     10     mins  11362;     Gait Training:           mins  45924;     Ultrasound:          mins  20777;    Ionto                                   mins   43635  Self Care                            mins   05128  Canalith Repos         mins 75634      Un-Timed:  Electrical Stimulation:         mins  34187 ( );  Dry Needling          mins self-pay  Traction           mins 00397      Timed Treatment:   46   mins   Total Treatment:     46   mins    Marybeth Marquez, PTA  [place] License: [id number]

## 2022-07-18 ENCOUNTER — TREATMENT (OUTPATIENT)
Dept: PHYSICAL THERAPY | Facility: CLINIC | Age: 72
End: 2022-07-18

## 2022-07-18 DIAGNOSIS — G89.29 CHRONIC PAIN OF LEFT KNEE: ICD-10-CM

## 2022-07-18 DIAGNOSIS — Z96.652 HX OF TOTAL KNEE ARTHROPLASTY, LEFT: Primary | ICD-10-CM

## 2022-07-18 DIAGNOSIS — M25.662 DECREASED ROM OF LEFT KNEE: ICD-10-CM

## 2022-07-18 DIAGNOSIS — M25.562 CHRONIC PAIN OF LEFT KNEE: ICD-10-CM

## 2022-07-18 PROCEDURE — 97530 THERAPEUTIC ACTIVITIES: CPT | Performed by: PHYSICAL THERAPIST

## 2022-07-18 PROCEDURE — 97110 THERAPEUTIC EXERCISES: CPT | Performed by: PHYSICAL THERAPIST

## 2022-07-18 NOTE — PROGRESS NOTES
Physical Therapy Daily Treatment Note      Patient: Julieta Frey [facility]   : 1950  Referring practitioner: Shon Murillo MD  Date of Initial Visit: Type: THERAPY  Noted: 2022  Today's Date: 2022  Patient seen for 8 sessions       Visit Diagnoses:    ICD-10-CM ICD-9-CM   1. Hx of total knee arthroplasty, left  Z96.652 V43.65   2. Chronic pain of left knee  M25.562 719.46    G89.29 338.29   3. Decreased ROM of left knee  M25.662 719.56       Subjective:  Patient arrives to therapy w/ reports of 3/10 left knee pain.  Patient states she has been doing her exercises at home.    Objective          Active Range of Motion   Left Knee   Flexion: Left knee active flexion: AAROM:  113.       See Exercise, Manual, and Modality Logs for complete treatment.       Assessment/Plan:  Patient tolerated today's session well w/ no significant complaints of pain increase noted following, 3/10.  Treatment initiated w/ therex and therapeutic activities as listed w/ continued focus on improved left knee ROM, improved left LE strength, and knee stability f/b gait training w/ and w/ out assistive device. Pt continues to require cues during exercise and gait training to maintain form, and for max benefit.  Pt demonstrated improved knee flexion and extension range of motion as listed above.  Pt performed gait training w/ out assistive device and w/ SC within PT clinic.  Pt observed w/ better weight shift, and improved patient safety with SC. Pt educated to continue to use assistive device for improved patient safety, w/ pt verbalizing understanding.  Cryotherapy applied at conclusion.    No signs of distress or adverse reactions observed during, and/ or following tx.  Pt continues to benefit from therapy services, and will be progressed as tolerated to address goals, reduce pain, and improve mobility.  Continue w/ PT's POC.       Timed:         Manual Therapy:         mins  57963;     Therapeutic Exercise:     29     mins  01375;     Neuromuscular Linda:        mins  92924;    Therapeutic Activity:     10     mins  56383;     Gait Trainin      mins  34757;     Ultrasound:          mins  23725;    Ionto                                   mins   78191  Self Care                            mins   27679  Canalith Repos         mins 95912      Un-Timed:  Electrical Stimulation:         mins  67573 ( );  Dry Needling          mins self-pay  Traction          mins 70395      Timed Treatment:   46   mins   Total Treatment:    54    mins (CP: x 8 min)    Sophie Lazaro. HERI Rosa  [place] License: [id number]

## 2022-07-20 ENCOUNTER — TREATMENT (OUTPATIENT)
Dept: PHYSICAL THERAPY | Facility: CLINIC | Age: 72
End: 2022-07-20

## 2022-07-20 DIAGNOSIS — Z96.652 HX OF TOTAL KNEE ARTHROPLASTY, LEFT: Primary | ICD-10-CM

## 2022-07-20 DIAGNOSIS — G89.29 CHRONIC PAIN OF LEFT KNEE: ICD-10-CM

## 2022-07-20 DIAGNOSIS — M25.562 CHRONIC PAIN OF LEFT KNEE: ICD-10-CM

## 2022-07-20 DIAGNOSIS — M25.662 DECREASED ROM OF LEFT KNEE: ICD-10-CM

## 2022-07-20 PROCEDURE — 97110 THERAPEUTIC EXERCISES: CPT | Performed by: PHYSICAL THERAPIST

## 2022-07-20 PROCEDURE — 97530 THERAPEUTIC ACTIVITIES: CPT | Performed by: PHYSICAL THERAPIST

## 2022-07-20 NOTE — PROGRESS NOTES
Physical Therapy Progress Note  Patient: Julieta Holt   : 1950  Diagnosis/ICD-10 Code:  Hx of total knee arthroplasty, left [Z96.652]  Referring practitioner: Shon Murillo MD  Date of Initial Visit: Type: THERAPY  Noted: 2022  Today's Date: 2022  Patient seen for 9 sessions         Visit Diagnoses:    ICD-10-CM ICD-9-CM   1. Hx of total knee arthroplasty, left  Z96.652 V43.65   2. Chronic pain of left knee  M25.562 719.46    G89.29 338.29   3. Decreased ROM of left knee  M25.662 719.56         Julieta Holt reports: She feels like her knee is progressing well with therapy.  She notes that she continue to perform her HEP.    Clinical Progress: improved  Home Program Compliance: Yes      Subjective Evaluation    Pain  Current pain ratin  At best pain ratin  At worst pain ratin             Objective          Active Range of Motion   Left Knee   Flexion: 109 degrees   Extension: Left knee active extension: lacking 9 degrees from full extension.     Right Knee   Flexion: 135 degrees   Extension: 0 degrees     Strength/Myotome Testing     Additional Strength Details  Left ankle 4/5 grossly    Ambulation     Comments   Pt amb with use of rw with antalgic gait and decreased stance on left          Assessment & Plan     Assessment  Impairments: abnormal coordination, abnormal gait, abnormal muscle firing, abnormal or restricted ROM, activity intolerance, impaired balance, impaired physical strength, lacks appropriate home exercise program, pain with function, safety issue and weight-bearing intolerance  Functional Limitations: walking, uncomfortable because of pain, standing and unable to perform repetitive tasks  Assessment details: Patient has been attending physical therapy for rehabilitation following a left TKA.  Patient is showing progress with left knee AROM, with patient currently displaying 9-9-109 degrees.  Patient continues to ambulate with antalgic  gait and with SC.  Patient will continue to be progressed per her tolerance and POC.  Prognosis: good    Goals  Plan Goals: STG 6 weeks  1 Pt will be instructed in a HEP.  Met  2 Pt will demonstrated left knee ROM 7-100 degrees on more.  Ongoing, progressing  3 Pt will demonstrate 4-/5 gross knee strength.  Ongoing    LTG 12 weeks  1 Pt will improve her left knee ROM to 3-115 degrees.  Ongoing, progressing  2 Pt will improve her LEFs to less than 30%.  Not assessed  3 Pt will amb without an AD with increased velocity and symmetry.  Ongoing, progressing  4 Pt will report pain no greater than 2/10 with increased walking and standing.  Ongoing, progressing-up to 3-4/10 pain with walking and standing    Plan  Therapy options: will be seen for skilled therapy services  Planned modality interventions: cryotherapy, thermotherapy (hydrocollator packs) and ultrasound  Planned therapy interventions: ADL retraining, balance/weight-bearing training, flexibility, functional ROM exercises, gait training, home exercise program, IADL retraining, joint mobilization, manual therapy, motor coordination training, neuromuscular re-education, postural training, soft tissue mobilization, strengthening, stretching and therapeutic activities  Frequency: 3x week  Duration in weeks: 8  Treatment plan discussed with: patient  Plan details: Re-evaluation   18896  Therapeutic exercise  94218  Therapeutic activity    76756  Neuromuscular re-education   49147  Manual therapy   51289  Gait training  04876  Unattended e-stim (Medicaid/Medicare)     Moist heat/cryotherapy 09482   Ultrasound   27746               Recommendations: Continue as planned  Timeframe: 2 months  Prognosis to achieve goals: good      Timed:         Manual Therapy:         mins  76301;     Therapeutic Exercise:    35     mins  54462;     Neuromuscular Linda:        mins  89212;    Therapeutic Activity:     10     mins  92537;     Gait Training:           mins  84054;      Ultrasound:          mins  38194;    Ionto                                   mins   36669  Self Care                            mins   92282  Canalith Repos         mins 30351      Un-Timed:  Electrical Stimulation:         mins  76604 ( );  Dry Needling          mins self-pay  Traction          mins 11603  Re-Eval                               mins  96762  Ice-6 min    Timed Treatment:  45   mins   Total Treatment:     51   mins          PT: Elle Mora PT     [place] License:  [id number]    Electronically signed by Elle Mora PT, 07/20/22, 10:20 AM EDT    Certification Period: 7/20/2022 thru 10/17/2022  I certify that the therapy services are furnished while this patient is under my care.  The services outlined above are required by this patient, and will be reviewed every 90 days.         Physician Signature:__________________________________________________    PHYSICIAN: Shon Murillo MD  NPI: [id number]                                      DATE:  :     Please sign and return via fax to [email or web address] . Thank you, [facility] Physical Therapy

## 2022-07-22 ENCOUNTER — TREATMENT (OUTPATIENT)
Dept: PHYSICAL THERAPY | Facility: CLINIC | Age: 72
End: 2022-07-22

## 2022-07-22 DIAGNOSIS — G89.29 CHRONIC PAIN OF LEFT KNEE: ICD-10-CM

## 2022-07-22 DIAGNOSIS — M25.662 DECREASED ROM OF LEFT KNEE: ICD-10-CM

## 2022-07-22 DIAGNOSIS — Z96.652 HX OF TOTAL KNEE ARTHROPLASTY, LEFT: Primary | ICD-10-CM

## 2022-07-22 DIAGNOSIS — M25.562 CHRONIC PAIN OF LEFT KNEE: ICD-10-CM

## 2022-07-22 PROCEDURE — 97530 THERAPEUTIC ACTIVITIES: CPT | Performed by: PHYSICAL THERAPIST

## 2022-07-22 PROCEDURE — 97110 THERAPEUTIC EXERCISES: CPT | Performed by: PHYSICAL THERAPIST

## 2022-07-22 NOTE — PROGRESS NOTES
Physical Therapy Daily Treatment Note      Patient: Julieta Frey [facility]   : 1950  Referring practitioner: Shon Murillo MD  Date of Initial Visit: Type: THERAPY  Noted: 2022  Today's Date: 2022  Patient seen for 10 sessions       Visit Diagnoses:    ICD-10-CM ICD-9-CM   1. Hx of total knee arthroplasty, left  Z96.652 V43.65   2. Chronic pain of left knee  M25.562 719.46    G89.29 338.29   3. Decreased ROM of left knee  M25.662 719.56       Subjective:  Patient arrives to therapy w/ reports of 3/10 left knee pain.  Otherwise pt states of no new complaints/ changes.    Objective   See Exercise, Manual, and Modality Logs for complete treatment.       Assessment/Plan:  Patient responded well to 's session, however reported slight increase in soreness following, 4/10.  No complaints or signs of distress observed.  Treatment initiated w/ LE bike f/b therex and therapeutic activities as listed for improved left knee ROM, improved left LE strength, and to assist w/ return of function performing ADL's.  Exercise progressed w/ addition of cuff weight  to prone knee hang to assist w/ extension deficit.  Additional strengthening activities added including step ups w/ good tolerance observed.  Pt continues to require cues during exercise to maintain form, and for max benefit.  Cryotherapy applied at conclusion.  No signs of distress or adverse reactions observed during, and/ or following tx. Pt continues to benefit from therapy services, and will be progressed as tolerated to address goals, restore mobility and improve strength.  Continue w/ PT's POC.       Timed:         Manual Therapy:         mins  17289;     Therapeutic Exercise:   36      mins  77580;     Neuromuscular Linda:        mins  99275;    Therapeutic Activity:    11      mins  09551;     Gait Training:           mins  90569;     Ultrasound:          mins  56309;    Ionto                                   mins   21540  Self  Care                            mins   37552  Canalith Repos         mins 76977      Un-Timed:  Electrical Stimulation:         mins  67544 ( );  Dry Needling          mins self-pay  Traction          mins 69831      Timed Treatment:   47   mins   Total Treatment:    55    mins  (CP: x 8min)    Sophie Lazaro. HERI Rosa  [place] License: [id number]

## 2022-07-26 ENCOUNTER — HOSPITAL ENCOUNTER (OUTPATIENT)
Dept: CT IMAGING | Facility: HOSPITAL | Age: 72
Discharge: HOME | End: 2022-07-26
Admitting: NURSE PRACTITIONER

## 2022-07-26 DIAGNOSIS — Z87.891 PERSONAL HISTORY OF TOBACCO USE, PRESENTING HAZARDS TO HEALTH: ICD-10-CM

## 2022-07-26 PROCEDURE — 71271 CT THORAX LUNG CANCER SCR C-: CPT | Performed by: RADIOLOGY

## 2022-07-26 PROCEDURE — 71271 CT THORAX LUNG CANCER SCR C-: CPT

## 2022-07-27 ENCOUNTER — TREATMENT (OUTPATIENT)
Dept: PHYSICAL THERAPY | Facility: CLINIC | Age: 72
End: 2022-07-27

## 2022-07-27 DIAGNOSIS — G89.29 CHRONIC PAIN OF LEFT KNEE: ICD-10-CM

## 2022-07-27 DIAGNOSIS — M25.562 CHRONIC PAIN OF LEFT KNEE: ICD-10-CM

## 2022-07-27 DIAGNOSIS — M25.662 DECREASED ROM OF LEFT KNEE: ICD-10-CM

## 2022-07-27 DIAGNOSIS — Z96.652 HX OF TOTAL KNEE ARTHROPLASTY, LEFT: Primary | ICD-10-CM

## 2022-07-27 PROCEDURE — 97530 THERAPEUTIC ACTIVITIES: CPT | Performed by: PHYSICAL THERAPIST

## 2022-07-27 PROCEDURE — 97110 THERAPEUTIC EXERCISES: CPT | Performed by: PHYSICAL THERAPIST

## 2022-07-27 NOTE — PROGRESS NOTES
Physical Therapy Daily Treatment Note      Patient: Julieta Frey [facility]   : 1950  Referring practitioner: Shon Murillo MD  Date of Initial Visit: Type: THERAPY  Noted: 2022  Today's Date: 2022  Patient seen for 11 sessions       Visit Diagnoses:    ICD-10-CM ICD-9-CM   1. Hx of total knee arthroplasty, left  Z96.652 V43.65   2. Chronic pain of left knee  M25.562 719.46    G89.29 338.29   3. Decreased ROM of left knee  M25.662 719.56       Subjective:  Patient arrives to therapy w/ reports of 1-2/10 left knee pain.  Otherwise pt states of no new complaints/ concerns.     Objective          Active Range of Motion   Left Knee   Flexion: 115 degrees       See Exercise, Manual, and Modality Logs for complete treatment.       Assessment/Plan:  Patient responded well to today's session w/ no significant changes in pain noted following, 2/10.  Treatment initiated w/ LE bike f/b therex and therapeutic activities as listed w/ continued focus on improved left knee ROM, improved left LE strength, and to assist w/ return of function performing ADL's.  Exercise progressed w/ weight increased from 2 to 2.5 pounds w/ prone knee hang w/ good tolerance observed.  Pt educated on importance of performing home program for max gains, w/ pt verbalizing understanding.  Cryotherapy applied at conclusion.  Pt continues to ambulate w/ single point cane w/ good tolerance noted. No signs of distress or adverse reactions observed during, and/ or following tx. Pt continues to benefit from therapy services, and will be progressed as tolerated to address goals, improved ROM, and restore mobility.  Continue w/ PT's POC.       Timed:         Manual Therapy:         mins  85972;     Therapeutic Exercise:    36     mins  94623;     Neuromuscular Linda:        mins  81047;    Therapeutic Activity:     10     mins  78735;     Gait Training:           mins  98641;     Ultrasound:          mins  93146;    Ionto                                    mins   20361  Self Care                            mins   04344  Canalith Repos         mins 58219      Un-Timed:  Electrical Stimulation:         mins  15114 ( );  Dry Needling          mins self-pay  Traction          mins 06701      Timed Treatment:   46   mins   Total Treatment:    52    mins (CP: x 6min)     Sophie Lazaro. Shelley, HERI  [place] License: [id number]

## 2022-07-29 ENCOUNTER — TREATMENT (OUTPATIENT)
Dept: PHYSICAL THERAPY | Facility: CLINIC | Age: 72
End: 2022-07-29

## 2022-07-29 DIAGNOSIS — M25.662 DECREASED ROM OF LEFT KNEE: ICD-10-CM

## 2022-07-29 DIAGNOSIS — M25.562 CHRONIC PAIN OF LEFT KNEE: ICD-10-CM

## 2022-07-29 DIAGNOSIS — G89.29 CHRONIC PAIN OF LEFT KNEE: ICD-10-CM

## 2022-07-29 DIAGNOSIS — Z96.652 HX OF TOTAL KNEE ARTHROPLASTY, LEFT: Primary | ICD-10-CM

## 2022-07-29 PROCEDURE — 97530 THERAPEUTIC ACTIVITIES: CPT | Performed by: PHYSICAL THERAPIST

## 2022-07-29 PROCEDURE — 97110 THERAPEUTIC EXERCISES: CPT | Performed by: PHYSICAL THERAPIST

## 2022-07-29 NOTE — PROGRESS NOTES
Physical Therapy Daily Treatment Note      Patient: Julieta Frey [facility]   : 1950  Referring practitioner: hSon Murillo MD  Date of Initial Visit: Type: THERAPY  Noted: 2022  Today's Date: 2022  Patient seen for 12 sessions       Visit Diagnoses:    ICD-10-CM ICD-9-CM   1. Hx of total knee arthroplasty, left  Z96.652 V43.65   2. Chronic pain of left knee  M25.562 719.46    G89.29 338.29   3. Decreased ROM of left knee  M25.662 719.56       Subjective:  Patient arrives to therapy w/ reports of /10 left knee pain.  Patient states she was a little sore following last session, however she notes tolerable.      Objective   See Exercise, Manual, and Modality Logs for complete treatment.       Assessment/Plan:  Patient responded well to today's session w/ no complaints of pain increase noted following, 2/10.  Treatment initiated w/ LE bike f/b therex and therapeutic activities as listed and cryotherapy following.  Exercise performed w/ continued focus on improved left knee ROM, improved left LE strength, and to assist w/ return of function performing ADL's.  Exercise progressed w/ step ups increased from 4 to 6 inch, and seat on LE bike advanced forward to assist w/ improved knee flexion.  Pt provided w/ cues and demonstration intermittently throughout exercise to maintain form, and for improved quad facilitation.  No signs of distress or adverse reactions observed during, and/ or following tx.  Pt will be progressed w/ therapy as tolerated to address goals, reduce pain, and improved ROM.  Continue w/ PT's POC.       Timed:         Manual Therapy:         mins  85007;     Therapeutic Exercise:    37     mins  38042;     Neuromuscular Linda:        mins  11426;    Therapeutic Activity:    10      mins  32628;     Gait Training:           mins  85917;     Ultrasound:          mins  81043;    Ionto                                   mins   05543  Self Care                            mins    12722  [facility]         mins 98606      Un-Timed:  Electrical Stimulation:         mins  68986 ( );  Dry Needling          mins self-pay  Traction          mins 49822      Timed Treatment:  47    mins   Total Treatment:    53    mins (CP: x 6 min)    Sophie Lazaro. Shelley, PTA  [place] License: [id number]

## 2022-08-01 ENCOUNTER — TREATMENT (OUTPATIENT)
Dept: PHYSICAL THERAPY | Facility: CLINIC | Age: 72
End: 2022-08-01

## 2022-08-01 DIAGNOSIS — M25.562 CHRONIC PAIN OF LEFT KNEE: ICD-10-CM

## 2022-08-01 DIAGNOSIS — Z96.652 HX OF TOTAL KNEE ARTHROPLASTY, LEFT: Primary | ICD-10-CM

## 2022-08-01 DIAGNOSIS — M25.662 DECREASED ROM OF LEFT KNEE: ICD-10-CM

## 2022-08-01 DIAGNOSIS — G89.29 CHRONIC PAIN OF LEFT KNEE: ICD-10-CM

## 2022-08-01 PROCEDURE — 97110 THERAPEUTIC EXERCISES: CPT | Performed by: PHYSICAL THERAPIST

## 2022-08-01 NOTE — PROGRESS NOTES
Physical Therapy Daily Treatment Note      Patient: Julieta Frey [facility]   : 1950  Referring practitioner: Shon Murillo MD  Date of Initial Visit: Type: THERAPY  Noted: 2022  Today's Date: 2022  Patient seen for 13 sessions       Visit Diagnoses:    ICD-10-CM ICD-9-CM   1. Hx of total knee arthroplasty, left  Z96.652 V43.65   2. Chronic pain of left knee  M25.562 719.46    G89.29 338.29   3. Decreased ROM of left knee  M25.662 719.56       Subjective:  Patient arrives to therapy w/ reports of 2/10 left knee pain.  Pt states she is scheduled to f/u with referring ortho tomorrow.        Objective   See Exercise, Manual, and Modality Logs for complete treatment.       Assessment/Plan:  Patient responded well to today's session w/ no complaints of pain increase noted following, 2/10.  Treatment initiated w/ therex and therapeutic activities as listed for improved left knee ROM, improved left LE strength, and to assist w/ return of function performing ADL's.  Exercise progressed w/ squats on TG initiated, and step ups repetitions increased from 20 to 30.  Pt noted w/ some fatigue, however she reported tolerable.  Pt lacked approximately 4 degrees from full knee extension, which has improved since initial evaluation, pt lacking 17 degrees.  Cryotherapy applied to left knee at conclusion.  No signs of distress or adverse reactions observed during, and/ or following tx.  Pt continues to benefit from therapy services, and will be progressed as tolerated to address goals, reduce pain, and improve function.  Continue w/ PT's POC.        Timed:         Manual Therapy:         mins  49526;     Therapeutic Exercise:    36     mins  53713;  (32 min charged secondary to shared minutes)   Neuromuscular Linda:        mins  45074;    Therapeutic Activity:     10     mins  89173;   (no charge secondary to shared minutes)  Gait Training:           mins  43648;     Ultrasound:          mins  79148;     Ionto                                   mins   65002  Self Care                            mins   72620  Canalith Repos         mins 70649      Un-Timed:  Electrical Stimulation:         mins  25117 ( );  Dry Needling          mins self-pay  Traction          mins 00130      Timed Treatment:   46   mins (32 charged secondary to shared minutes)  Total Treatment:     52   mins (CP:  X 6 min)    Sophie Lazaro. HERI Rosa  [place] License: [id number]

## 2022-08-02 ENCOUNTER — OFFICE VISIT (OUTPATIENT)
Dept: ORTHOPEDIC SURGERY | Facility: CLINIC | Age: 72
End: 2022-08-02

## 2022-08-02 DIAGNOSIS — Z09 SURGERY FOLLOW-UP: ICD-10-CM

## 2022-08-02 DIAGNOSIS — Z96.652 S/P TOTAL KNEE ARTHROPLASTY, LEFT: Primary | ICD-10-CM

## 2022-08-02 PROCEDURE — 99024 POSTOP FOLLOW-UP VISIT: CPT | Performed by: ORTHOPAEDIC SURGERY
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
## 2022-08-03 ENCOUNTER — TREATMENT (OUTPATIENT)
Dept: PHYSICAL THERAPY | Facility: CLINIC | Age: 72
End: 2022-08-03

## 2022-08-03 DIAGNOSIS — M25.662 DECREASED ROM OF LEFT KNEE: ICD-10-CM

## 2022-08-03 DIAGNOSIS — M25.562 CHRONIC PAIN OF LEFT KNEE: ICD-10-CM

## 2022-08-03 DIAGNOSIS — Z96.652 HX OF TOTAL KNEE ARTHROPLASTY, LEFT: Primary | ICD-10-CM

## 2022-08-03 DIAGNOSIS — G89.29 CHRONIC PAIN OF LEFT KNEE: ICD-10-CM

## 2022-08-03 PROCEDURE — 97110 THERAPEUTIC EXERCISES: CPT | Performed by: PHYSICAL THERAPIST

## 2022-08-03 PROCEDURE — 97140 MANUAL THERAPY 1/> REGIONS: CPT | Performed by: PHYSICAL THERAPIST

## 2022-08-03 NOTE — PROGRESS NOTES
Physical Therapy Daily Treatment Note      Patient: Julieta Frey [facility]   : 1950  Referring practitioner: Shon Murillo MD  Date of Initial Visit: Type: THERAPY  Noted: 2022  Today's Date: 8/3/2022  Patient seen for 14 sessions       Visit Diagnoses:    ICD-10-CM ICD-9-CM   1. Hx of total knee arthroplasty, left  Z96.652 V43.65   2. Chronic pain of left knee  M25.562 719.46    G89.29 338.29   3. Decreased ROM of left knee  M25.662 719.56       Subjective Evaluation    History of Present Illness    Subjective comment: Pt has 0/10 pain today and pt may now start scar massage.       Objective   See Exercise, Manual, and Modality Logs for complete treatment.       Assessment & Plan     Assessment    Assessment details: Tx today consisted of knee patella mobs; followed by exercises in supine for improved mobility; standing exercises for improved leg stability and ended with stm and scar massage.  Pt noted to have stitch along mid incision and massage performed away from this region.  Pt demonstrated good effort today with no pain post tx.    Plan  Plan details: Will follow progressing knee stability and function per protocol.  May progress gait next session.          Timed:         Manual Therapy:    12     mins  79799;     Therapeutic Exercise:    31     mins  74539;     Neuromuscular Linda:        mins  50296;    Therapeutic Activity:          mins  62753;     Gait Training:           mins  05919;     Ultrasound:          mins  53084;    Ionto                                   mins   87662  Self Care                            mins   60847  Canalith Repos         mins 78511      Un-Timed:  Electrical Stimulation:         mins  52135 (MC );  Dry Needling          mins self-pay  Traction          mins 08120      Timed Treatment:   43   mins   Total Treatment:     49   Mins(6min)    Paul Reno PT  [place] License: [id number]      Electronically signed by Paul Reno PT,  08/03/22, 10:16 AM EDT

## 2022-08-05 ENCOUNTER — TREATMENT (OUTPATIENT)
Dept: PHYSICAL THERAPY | Facility: CLINIC | Age: 72
End: 2022-08-05

## 2022-08-05 DIAGNOSIS — M25.662 DECREASED ROM OF LEFT KNEE: ICD-10-CM

## 2022-08-05 DIAGNOSIS — G89.29 CHRONIC PAIN OF LEFT KNEE: ICD-10-CM

## 2022-08-05 DIAGNOSIS — M25.562 CHRONIC PAIN OF LEFT KNEE: ICD-10-CM

## 2022-08-05 DIAGNOSIS — Z96.652 HX OF TOTAL KNEE ARTHROPLASTY, LEFT: Primary | ICD-10-CM

## 2022-08-05 PROCEDURE — 97110 THERAPEUTIC EXERCISES: CPT | Performed by: PHYSICAL THERAPIST

## 2022-08-05 NOTE — PROGRESS NOTES
Physical Therapy Daily Treatment Note      Patient: Julieta Frey [facility]   : 1950  Referring practitioner: Shon Murillo MD  Date of Initial Visit: Type: THERAPY  Noted: 2022  Today's Date: 2022  Patient seen for 15 sessions       Visit Diagnoses:    ICD-10-CM ICD-9-CM   1. Hx of total knee arthroplasty, left  Z96.652 V43.65   2. Chronic pain of left knee  M25.562 719.46    G89.29 338.29   3. Decreased ROM of left knee  M25.662 719.56       Subjective :  Patient arrives to therapy w/ reports of 1-2/10 left knee pain.  Pt states she is a little sore this morning due to jennifer cabbage yesterday.     Objective          Active Range of Motion   Left Knee   Flexion: 110 degrees       See Exercise, Manual, and Modality Logs for complete treatment.       Assessment/Plan:  Patient responded well to today's session, however reported slight increase in soreness following, 3/10.  No signs of distress observed.  Treatment consisted of therex and therapeutic activities as listed w/ continued focus on improved left knee ROM, improved left LE strength, and to assist w/ ease of difficulty transitioning and returning to ADL's.  Exercise progressed w/ level on total gym increased from LV 17 to LV 18, and additional standing activities added including hip abduction.  Pt observed w/ good tolerance, and was provided w/ cues and demonstration to maintain form, and for max benefit.  Pt educated on importance of performing home program including prone knee hang to address deficits. Pt verbalized understanding. Cryotherapy applied at conclusion w/ static knee extension.  Pt continues to benefit from therapy services, and will be progressed as tolerated to address goals, improved knee ROM and restore mobility.  Continue w/ PT's POC.       Timed:         Manual Therapy:         mins  22813;     Therapeutic Exercise:   40      mins  93575;     Neuromuscular Linda:        mins  92574;    Therapeutic Activity:      13     mins  59693;   (no charge secondary to shared mintues)  Gait Training:           mins  88082;     Ultrasound:          mins  34446;    Ionto                                   mins   11429  Self Care                            mins   60302  Canalith Repos         mins 73031      Un-Timed:  Electrical Stimulation:         mins  91879 ( );  Dry Needling          mins self-pay  Traction          mins 44394      Timed Treatment:  53    mins (40 min charged secondary to shared minutes)  Total Treatment:   59     mins (CP: x 6 min)    Sophie Lazaro. Shelley, [facility]  [place] License: [id number]

## 2022-08-08 ENCOUNTER — TREATMENT (OUTPATIENT)
Dept: PHYSICAL THERAPY | Facility: CLINIC | Age: 72
End: 2022-08-08

## 2022-08-08 DIAGNOSIS — G89.29 CHRONIC PAIN OF LEFT KNEE: ICD-10-CM

## 2022-08-08 DIAGNOSIS — M25.562 CHRONIC PAIN OF LEFT KNEE: ICD-10-CM

## 2022-08-08 DIAGNOSIS — Z96.652 HX OF TOTAL KNEE ARTHROPLASTY, LEFT: Primary | ICD-10-CM

## 2022-08-08 DIAGNOSIS — M25.662 DECREASED ROM OF LEFT KNEE: ICD-10-CM

## 2022-08-08 PROCEDURE — 97110 THERAPEUTIC EXERCISES: CPT | Performed by: PHYSICAL THERAPIST

## 2022-08-08 NOTE — PROGRESS NOTES
"Physical Therapy Daily Treatment Note      Patient: Julieta Frey [facility]   : 1950  Referring practitioner: Shon Murillo MD  Date of Initial Visit: Type: THERAPY  Noted: 2022  Today's Date: 2022  Patient seen for 16 sessions       Visit Diagnoses:    ICD-10-CM ICD-9-CM   1. Hx of total knee arthroplasty, left  Z96.652 V43.65   2. Chronic pain of left knee  M25.562 719.46    G89.29 338.29   3. Decreased ROM of left knee  M25.662 719.56       Subjective Evaluation    History of Present Illness    Subjective comment: The patient reports 1/10 left knee pain prior to today's session. She states 1/10 is \"about my normal unless it rains\".       Objective   See Exercise, Manual, and Modality Logs for complete treatment.       Assessment & Plan     Assessment    Assessment details: Therapeutic exercises were performed in the seated, standing, and supine positions for improved left knee range of motion and strength. Tactile and verbal cues were provided for proper form. Rest breaks were provided as needed secondary to fatigue. The patient ambulated with decreased left weight shift and extension at heel strike. Today's treatment session concluded with cryotherapy to the left knee to address inflammation. No skin irritation was observed following modalities. The patient reported 1/10 left knee pain following today's session.    Plan  Plan details: Progress standing activities for improved functional mobility.          Timed:         Manual Therapy:    38     mins  81840;     Therapeutic Exercise:         mins  03694;     Neuromuscular Linda:        mins  72930;    Therapeutic Activity:          mins  16297;     Gait Training:           mins  32887;     Ultrasound:          mins  36074;    Ionto                                   mins   23296  Self Care                            mins   30614  Canalith Repos         mins 59121      Un-Timed:  Electrical Stimulation:        mins  62913 (MC " );  Dry Needling          mins self-pay  Traction          mins 43866      Timed Treatment:   38   mins   Total Treatment:     43   mins (5 minutes cryotherapy)    Ashley Claudene Dalton, PT  [place] License: [id number]

## 2022-08-10 ENCOUNTER — TREATMENT (OUTPATIENT)
Dept: PHYSICAL THERAPY | Facility: CLINIC | Age: 72
End: 2022-08-10

## 2022-08-10 DIAGNOSIS — M25.562 CHRONIC PAIN OF LEFT KNEE: ICD-10-CM

## 2022-08-10 DIAGNOSIS — G89.29 CHRONIC PAIN OF LEFT KNEE: ICD-10-CM

## 2022-08-10 DIAGNOSIS — Z96.652 HX OF TOTAL KNEE ARTHROPLASTY, LEFT: Primary | ICD-10-CM

## 2022-08-10 DIAGNOSIS — M25.662 DECREASED ROM OF LEFT KNEE: ICD-10-CM

## 2022-08-10 PROCEDURE — 97110 THERAPEUTIC EXERCISES: CPT | Performed by: PHYSICAL THERAPIST

## 2022-08-10 NOTE — PROGRESS NOTES
Physical Therapy Daily Treatment Note      Patient: Julieta Frey [facility]   : 1950  Referring practitioner: Shon Murillo MD  Date of Initial Visit: Type: THERAPY  Noted: 2022  Today's Date: 8/10/2022  Patient seen for 17 sessions       Visit Diagnoses:    ICD-10-CM ICD-9-CM   1. Hx of total knee arthroplasty, left  Z96.652 V43.65   2. Chronic pain of left knee  M25.562 719.46    G89.29 338.29   3. Decreased ROM of left knee  M25.662 719.56       Subjective   Patient reports that she is having 1/10 pain in her left knee. Patient states that she done good following last treatment session.     Objective   See Exercise, Manual, and Modality Logs for complete treatment.       Assessment/Plan  Patient tolerated treatment session well with rest breaks taken as needed by the patient. Educated patient to perform therex per her tolerance, patient verbalized understanding. No adverse reactions with modalities or treatment session. No adverse reactions with modalities or treatment session. Treatment session consisted of exercises to improve strength and ROM to the left knee. Reps increased on several exercises with no increase in pain noted. TG height increased with no increase in pain. Pain remained the same from pre to post treatment session. Continue per PT's POC, progress exercises per the patient's tolerance.     Timed:         Manual Therapy:         mins  57442;     Therapeutic Exercise:   32      mins  52663;     Neuromuscular Linda:        mins  23255;    Therapeutic Activity:     14     mins  84830;   (no charge due to shared minutes)  Gait Training:           mins  37389;     Ultrasound:          mins  74532;    Ionto                                   mins   34079  Self Care                            mins   66018  Canalith Repos         mins 03126      Un-Timed:  Electrical Stimulation:         mins  93472 ( );  Dry Needling          mins self-pay  Traction          mins  97971      Timed Treatment:   46   mins (32 minutes due to shared minutes)  Total Treatment:    52   mins (38 minutes with ice)    Marybeth Marquez, PTA  [place] License: [id number]

## 2022-08-12 ENCOUNTER — TREATMENT (OUTPATIENT)
Dept: PHYSICAL THERAPY | Facility: CLINIC | Age: 72
End: 2022-08-12

## 2022-08-12 DIAGNOSIS — G89.29 CHRONIC PAIN OF LEFT KNEE: ICD-10-CM

## 2022-08-12 DIAGNOSIS — Z96.652 HX OF TOTAL KNEE ARTHROPLASTY, LEFT: Primary | ICD-10-CM

## 2022-08-12 DIAGNOSIS — M25.562 CHRONIC PAIN OF LEFT KNEE: ICD-10-CM

## 2022-08-12 DIAGNOSIS — M25.662 DECREASED ROM OF LEFT KNEE: ICD-10-CM

## 2022-08-12 PROCEDURE — 97530 THERAPEUTIC ACTIVITIES: CPT | Performed by: PHYSICAL THERAPIST

## 2022-08-12 PROCEDURE — 97110 THERAPEUTIC EXERCISES: CPT | Performed by: PHYSICAL THERAPIST

## 2022-08-12 NOTE — PROGRESS NOTES
Physical Therapy Progress Note  Patient: Julieta Holt   : 1950  Diagnosis/ICD-10 Code:  Hx of total knee arthroplasty, left [Z96.652]  Referring practitioner: Shon Murillo MD  Date of Initial Visit: Type: THERAPY  Noted: 2022  Today's Date: 2022  Patient seen for 18 sessions         Visit Diagnoses:    ICD-10-CM ICD-9-CM   1. Hx of total knee arthroplasty, left  Z96.652 V43.65   2. Chronic pain of left knee  M25.562 719.46    G89.29 338.29   3. Decreased ROM of left knee  M25.662 719.56         Julieta Holt reports: She can tell her left leg is still weak.  She notes that she continues to perform her HEP.  Clinical Progress: improved  Home Program Compliance: Yes      Subjective Evaluation    Pain  Current pain ratin  At best pain ratin  At worst pain ratin             Objective          Active Range of Motion   Left Knee   Flexion: 113 degrees   Extension: Left knee active extension: lacking 9 degrees from full extension.     Right Knee   Flexion: 135 degrees   Extension: 0 degrees     Strength/Myotome Testing     Left Hip   Planes of Motion   Flexion: 4-    Right Hip   Planes of Motion   Flexion: 4    Left Knee   Flexion: 3+  Extension: 3-    Right Knee   Flexion: 4+  Extension: 4+    Left Ankle/Foot   Dorsiflexion: 4-  Plantar flexion: 4-    Right Ankle/Foot   Dorsiflexion: 4+  Plantar flexion: 4+          Assessment & Plan     Assessment  Impairments: abnormal coordination, abnormal gait, abnormal muscle firing, abnormal or restricted ROM, activity intolerance, impaired balance, impaired physical strength, lacks appropriate home exercise program, pain with function, safety issue and weight-bearing intolerance  Functional Limitations: walking, uncomfortable because of pain, standing and unable to perform repetitive tasks  Assessment details: Patient has been attending physical therapy for rehabilitation following a left TKA; she has attended therapy  for a total of 18 sessions, dating from 6//27/2022 to 8/12/2022.  Patient has shown improvements in left knee AROM and LE strength.  She currently displays 5-0-113 degrees left knee AROM.  Patient reports 1/10 pain at best and 4/10 pain at worst.  She has currently met 1/3 STGs and 0/4 LTGs.  She will continue to benefit from skilled PT so that she can achieve her maximum level of function.  Prognosis: good    Goals  Plan Goals: STG 6 weeks  1 Pt will be instructed in a HEP.  Met  2 Pt will demonstrated left knee ROM 7-100 degrees on more.  Ongoing, progressing  3 Pt will demonstrate 4-/5 gross knee strength.  Ongoing, progressing    LTG 12 weeks  1 Pt will improve her left knee ROM to 3-115 degrees.  Ongoing, progressing  2 Pt will improve her LEFs to less than 30%.  Not assessed  3 Pt will amb without an AD with increased velocity and symmetry.  Ongoing, progressing  4 Pt will report pain no greater than 2/10 with increased walking and standing.  Ongoing, progressing-up to 4/10 pain with walking and standing    Plan  Therapy options: will be seen for skilled therapy services  Planned modality interventions: cryotherapy, thermotherapy (hydrocollator packs) and ultrasound  Planned therapy interventions: ADL retraining, balance/weight-bearing training, flexibility, functional ROM exercises, gait training, home exercise program, IADL retraining, joint mobilization, manual therapy, motor coordination training, neuromuscular re-education, postural training, soft tissue mobilization, strengthening, stretching and therapeutic activities  Frequency: 2x week  Duration in weeks: 4  Treatment plan discussed with: patient  Plan details: Re-evaluation   57741  Therapeutic exercise  87076  Therapeutic activity    32733  Neuromuscular re-education   03474  Manual therapy   02914  Gait training  16103  Unattended e-stim (Medicaid/Medicare)     Moist heat/cryotherapy 27719   Ultrasound   96434               Recommendations:  Continue as planned  Timeframe: 1 month  Prognosis to achieve goals: good      Timed:         Manual Therapy:         mins  11656;     Therapeutic Exercise:    35     mins  63675;  (charged for 20 minutes of there ex, due to shared minutes)  Neuromuscular Linda:        mins  05701;    Therapeutic Activity:     12     mins  41997;     Gait Training:           mins  15880;     Ultrasound:          mins  27225;    Ionto                                   mins   88489  Self Care                            mins   54780  Canalith Repos         mins 80601      Un-Timed:  Electrical Stimulation:         mins  68439 ( );  Dry Needling          mins self-pay  Traction          mins 42169  Re-Eval                               mins  40572  Ice with knee extension stretch on bolster-5 min    Timed Treatment:   47   mins (billed for 32 minutes of timed treatment, due to shared minutes)  Total Treatment:     52   mins          PT: Elle Mora PT     [place] License:  [id number]    Electronically signed by Elle Mora PT, 08/12/22, 10:01 AM EDT    Certification Period: 8/12/2022 thru 11/9/2022  I certify that the therapy services are furnished while this patient is under my care.  The services outlined above are required by this patient, and will be reviewed every 90 days.         Physician Signature:__________________________________________________    PHYSICIAN: Shon Murillo MD  NPI: [id number]                                      DATE:  :     Please sign and return via fax to [email or web address] . Thank you, [facility]

## 2022-08-15 ENCOUNTER — TREATMENT (OUTPATIENT)
Dept: PHYSICAL THERAPY | Facility: CLINIC | Age: 72
End: 2022-08-15

## 2022-08-15 DIAGNOSIS — G89.29 CHRONIC PAIN OF LEFT KNEE: ICD-10-CM

## 2022-08-15 DIAGNOSIS — Z96.652 HX OF TOTAL KNEE ARTHROPLASTY, LEFT: Primary | ICD-10-CM

## 2022-08-15 DIAGNOSIS — M25.662 DECREASED ROM OF LEFT KNEE: ICD-10-CM

## 2022-08-15 DIAGNOSIS — M25.562 CHRONIC PAIN OF LEFT KNEE: ICD-10-CM

## 2022-08-15 PROCEDURE — 97110 THERAPEUTIC EXERCISES: CPT | Performed by: PHYSICAL THERAPIST

## 2022-08-15 PROCEDURE — 97530 THERAPEUTIC ACTIVITIES: CPT | Performed by: PHYSICAL THERAPIST

## 2022-08-15 NOTE — PROGRESS NOTES
Physical Therapy Daily Treatment Note      Patient: Julieta Frey [facility]   : 1950  Referring practitioner: Shon Murillo MD  Date of Initial Visit: Type: THERAPY  Noted: 2022  Today's Date: 8/15/2022  Patient seen for 19 sessions       Visit Diagnoses:    ICD-10-CM ICD-9-CM   1. Hx of total knee arthroplasty, left  Z96.652 V43.65   2. Chronic pain of left knee  M25.562 719.46    G89.29 338.29   3. Decreased ROM of left knee  M25.662 719.56       Subjective Evaluation    History of Present Illness    Subjective comment: Pt reports having 1/10 pain today.       Objective   See Exercise, Manual, and Modality Logs for complete treatment.       Assessment & Plan     Assessment    Assessment details: Tx today consisted of exercises for improved knee mobility and stability followed by ice.  Pt demonstrated good effort today with noted improvements with gait for velocity and weight shifting.  Pt reported similar pain post tx at 1/10.    Plan  Plan details: Will follow per protocol for improved balance and gait.          Timed:         Manual Therapy:         mins  58842;     Therapeutic Exercise:    31     mins  15409;     Neuromuscular Linda:        mins  00919;    Therapeutic Activity:     12     mins  26418;     Gait Training:           mins  94225;     Ultrasound:          mins  38078;    Ionto                                   mins   97434  Self Care                            mins   19927  Canalith Repos         mins 30882      Un-Timed:  Electrical Stimulation:         mins  92539 ( );  Dry Needling          mins self-pay  Traction          mins 08302      Timed Treatment:   43   mins   Total Treatment:     49   Mins(6min ice)    Paul Reno PT  [place] License: [id number]      Electronically signed by Paul Reno PT, 08/15/22, 8:15 AM EDT

## 2022-08-17 ENCOUNTER — TREATMENT (OUTPATIENT)
Dept: PHYSICAL THERAPY | Facility: CLINIC | Age: 72
End: 2022-08-17

## 2022-08-17 DIAGNOSIS — Z96.652 HX OF TOTAL KNEE ARTHROPLASTY, LEFT: Primary | ICD-10-CM

## 2022-08-17 DIAGNOSIS — M25.662 DECREASED ROM OF LEFT KNEE: ICD-10-CM

## 2022-08-17 DIAGNOSIS — G89.29 CHRONIC PAIN OF LEFT KNEE: ICD-10-CM

## 2022-08-17 DIAGNOSIS — M25.562 CHRONIC PAIN OF LEFT KNEE: ICD-10-CM

## 2022-08-17 PROCEDURE — 97140 MANUAL THERAPY 1/> REGIONS: CPT | Performed by: PHYSICAL THERAPIST

## 2022-08-17 PROCEDURE — 97110 THERAPEUTIC EXERCISES: CPT | Performed by: PHYSICAL THERAPIST

## 2022-08-17 NOTE — PROGRESS NOTES
Physical Therapy Daily Treatment Note      Patient: Julieta Frey [facility]   : 1950  Referring practitioner: Shon Murillo MD  Date of Initial Visit: Type: THERAPY  Noted: 2022  Today's Date: 2022  Patient seen for 20 sessions       Visit Diagnoses:    ICD-10-CM ICD-9-CM   1. Hx of total knee arthroplasty, left  Z96.652 V43.65   2. Chronic pain of left knee  M25.562 719.46    G89.29 338.29   3. Decreased ROM of left knee  M25.662 719.56       Subjective Evaluation    History of Present Illness    Subjective comment: Pt reports having 1/10 pain.       Objective   See Exercise, Manual, and Modality Logs for complete treatment.       Assessment & Plan     Assessment    Assessment details: Tx today consisted of bike for improved knee ROM; stm and scar massage to knee; followed by exercises for improved knee stability and improved gait for improved ADLs at home.  Pt responded well to added resistance and reps with exercises today.  Pt reported 1/10 post pain.    Plan  Plan details: Will follow progressing knee mobility and stability for improved ADLs and function.          Timed:         Manual Therapy:    12     mins  27635;     Therapeutic Exercise:    33     mins  65574;     Neuromuscular Linda:        mins  50005;    Therapeutic Activity:          mins  83314;     Gait Training:           mins  51023;     Ultrasound:          mins  24808;    Ionto                                   mins   77952  Self Care                            mins   32346  Canalith Repos         mins 56942      Un-Timed:  Electrical Stimulation:         mins  70542 (MC );  Dry Needling          mins self-pay  Traction          mins 06222      Timed Treatment:   45   mins   Total Treatment:     51   Mins(6min ice)    Paul Reno PT  [place] License: [id number]      Electronically signed by Paul Reno PT, 22, 8:04 AM EDT

## 2022-08-19 ENCOUNTER — TREATMENT (OUTPATIENT)
Dept: PHYSICAL THERAPY | Facility: CLINIC | Age: 72
End: 2022-08-19

## 2022-08-19 DIAGNOSIS — Z96.652 HX OF TOTAL KNEE ARTHROPLASTY, LEFT: Primary | ICD-10-CM

## 2022-08-19 DIAGNOSIS — M25.662 DECREASED ROM OF LEFT KNEE: ICD-10-CM

## 2022-08-19 DIAGNOSIS — G89.29 CHRONIC PAIN OF LEFT KNEE: ICD-10-CM

## 2022-08-19 DIAGNOSIS — M25.562 CHRONIC PAIN OF LEFT KNEE: ICD-10-CM

## 2022-08-19 PROCEDURE — 97110 THERAPEUTIC EXERCISES: CPT | Performed by: PHYSICAL THERAPIST

## 2022-08-19 PROCEDURE — 97140 MANUAL THERAPY 1/> REGIONS: CPT | Performed by: PHYSICAL THERAPIST

## 2022-08-19 PROCEDURE — 97530 THERAPEUTIC ACTIVITIES: CPT | Performed by: PHYSICAL THERAPIST

## 2022-08-19 NOTE — PROGRESS NOTES
Physical Therapy Daily Treatment Note      Patient: Julieta Frey [facility]   : 1950  Referring practitioner: Shon Murillo MD  Date of Initial Visit: Type: THERAPY  Noted: 2022  Today's Date: 2022  Patient seen for 21 sessions       Visit Diagnoses:    ICD-10-CM ICD-9-CM   1. Hx of total knee arthroplasty, left  Z96.652 V43.65   2. Chronic pain of left knee  M25.562 719.46    G89.29 338.29   3. Decreased ROM of left knee  M25.662 719.56       Subjective:  Patient arrives to therapy w/ reports of 1/10 left knee pain.  Pt states she is scheduled to return to work on Tuesday.     Objective          Active Range of Motion   Left Knee   Flexion: 115 degrees       See Exercise, Manual, and Modality Logs for complete treatment.       Assessment/Plan:  Patient responded well to today's session w/ reports of slight decrease in knee pain following, 0.5/10 .  Treatment initiated w/ manual rx including soft tissue mobilization and scar massage to L) knee to assist w/ improved mobility and decreased tightness f/b therex as listed and cryotherapy following.  Exercise progressed w/ resistance level on LE bike increased from LV 2.5 to LV 2.6, and strengthening repetitions advanced, as tolerated.  Pt observed w/ good tolerance, and was provided w/ intermittent cues and demonstration to maintain form, and for improved weight shift onto left side.  Cryotherapy applied at conclusion.  Pt continues to benefit from therapy services, and will be progressed as tolerated to address goals, reduce pain, and improve mobility.  No signs of distress or adverse reactions observed during, and/ or following tx. Continue w/ PT's POC.       Conclusion of tx assisted by Paul Reno PT    Timed:         Manual Therapy:    10     mins  90246;     Therapeutic Exercise:    34     mins  45574;     Neuromuscular Linda:        mins  30426;    Therapeutic Activity:     10     mins  44476;     Gait Training:           mins   56044;     Ultrasound:          mins  89767;    Ionto                                   mins   54321  Self Care                            mins   93633  Canalith Repos         mins 52854      Un-Timed:  Electrical Stimulation:         mins  32606 ( );  Dry Needling          mins self-pay  Traction          mins 04790      Timed Treatment:  54    mins   Total Treatment:     62   mins (CP: x 8 min)    Sophie Lazaro. HERI Rosa  [place] License: [id number]

## 2022-08-22 ENCOUNTER — TREATMENT (OUTPATIENT)
Dept: PHYSICAL THERAPY | Facility: CLINIC | Age: 72
End: 2022-08-22

## 2022-08-22 DIAGNOSIS — Z96.652 HX OF TOTAL KNEE ARTHROPLASTY, LEFT: Primary | ICD-10-CM

## 2022-08-22 DIAGNOSIS — G89.29 CHRONIC PAIN OF LEFT KNEE: ICD-10-CM

## 2022-08-22 DIAGNOSIS — M25.662 DECREASED ROM OF LEFT KNEE: ICD-10-CM

## 2022-08-22 DIAGNOSIS — M25.562 CHRONIC PAIN OF LEFT KNEE: ICD-10-CM

## 2022-08-22 PROCEDURE — 97140 MANUAL THERAPY 1/> REGIONS: CPT | Performed by: PHYSICAL THERAPIST

## 2022-08-22 PROCEDURE — 97110 THERAPEUTIC EXERCISES: CPT | Performed by: PHYSICAL THERAPIST

## 2022-08-22 PROCEDURE — 97530 THERAPEUTIC ACTIVITIES: CPT | Performed by: PHYSICAL THERAPIST

## 2022-08-22 NOTE — PROGRESS NOTES
Physical Therapy Daily Treatment Note      Patient: Julieta Frey [facility]   : 1950  Referring practitioner: Shon Murillo MD  Date of Initial Visit: Type: THERAPY  Noted: 2022  Today's Date: 2022  Patient seen for 22 sessions       Visit Diagnoses:    ICD-10-CM ICD-9-CM   1. Hx of total knee arthroplasty, left  Z96.652 V43.65   2. Chronic pain of left knee  M25.562 719.46    G89.29 338.29   3. Decreased ROM of left knee  M25.662 719.56       Subjective:  Patient arrives to therapy w/ reports of 1/10 left knee pain.  Pt states she is scheduled to return to work tomorrow.     Objective   See Exercise, Manual, and Modality Logs for complete treatment.       Assessment/Plan:  Patient responded well to today's session w/ no reports of pain noted following.  Treatment initiated w/ manual soft tissue mobilization and scar massage to L) knee to assist w/ improved mobility f/b therex and therapeutc activities to assist w/ improved left LE strength, improved knee stability and to return and ease difficulty performing ADL's.  Pt performed forward and side stepping over balance pods to encourage knee and hip flexion, as well as to simulate home and outside environment.  Pt noted w/ some unsteadiness during stepping over agility dots w/ contact guard assist provided for improved patient safety.  Cryotherapy applied at conclusion.  Pt continues to benefit from therapy services, and will be progressed as tolerated to address goals, reduce pain, and restore function.  No adverse reactions or signs of distress observed during, and/ or following tx.  Continue w/ PT's POC.       Timed:         Manual Therapy:    11     mins  50973;     Therapeutic Exercise:    33     mins  92000;     Neuromuscular Linda:        mins  46998;    Therapeutic Activity:    10      mins  87571;     Gait Training:           mins  92686;     Ultrasound:          mins  02426;    Ionto                                   mins    83976  Self Care                            mins   81727  Canalith Repos         mins 54296      Un-Timed:  Electrical Stimulation:         mins  69248 ( );  Dry Needling          mins self-pay  Traction          mins 87351      Timed Treatment:  54    mins   Total Treatment:     62   mins (CP: x 8 min)    Sophie Lazaro. HERI Rosa  [place] License: [id number]

## 2022-08-26 ENCOUNTER — TREATMENT (OUTPATIENT)
Dept: PHYSICAL THERAPY | Facility: CLINIC | Age: 72
End: 2022-08-26

## 2022-08-26 DIAGNOSIS — Z96.652 HX OF TOTAL KNEE ARTHROPLASTY, LEFT: Primary | ICD-10-CM

## 2022-08-26 DIAGNOSIS — M25.662 DECREASED ROM OF LEFT KNEE: ICD-10-CM

## 2022-08-26 DIAGNOSIS — G89.29 CHRONIC PAIN OF LEFT KNEE: ICD-10-CM

## 2022-08-26 DIAGNOSIS — M25.562 CHRONIC PAIN OF LEFT KNEE: ICD-10-CM

## 2022-08-26 PROCEDURE — 97140 MANUAL THERAPY 1/> REGIONS: CPT | Performed by: PHYSICAL THERAPIST

## 2022-08-26 PROCEDURE — 97110 THERAPEUTIC EXERCISES: CPT | Performed by: PHYSICAL THERAPIST

## 2022-08-26 PROCEDURE — 97530 THERAPEUTIC ACTIVITIES: CPT | Performed by: PHYSICAL THERAPIST

## 2022-08-26 NOTE — PROGRESS NOTES
Physical Therapy Daily Treatment Note      Patient: Julieta Frey [facility]   : 1950  Referring practitioner: Shon Murillo MD  Date of Initial Visit: Type: THERAPY  Noted: 2022  Today's Date: 2022  Patient seen for 23 sessions       Visit Diagnoses:    ICD-10-CM ICD-9-CM   1. Hx of total knee arthroplasty, left  Z96.652 V43.65   2. Chronic pain of left knee  M25.562 719.46    G89.29 338.29   3. Decreased ROM of left knee  M25.662 719.56       Subjective Evaluation    History of Present Illness    Subjective comment: Patient reports that she has noticed her leg gets tired quickly since returning to work.Pain  Current pain ratin           Objective   See Exercise, Manual, and Modality Logs for complete treatment.       Assessment & Plan     Assessment    Assessment details: Therapy session consisted of there ex, therapeutic activities, manual therapy, and cryotherapy.  Patient progressed to include standing heel raise and standing march on foam to challenge patient with accommodating surfaces.  Following heel slides, patient displayed 114 degrees left knee flexion AROM.  Increased edema was noted at left LE during manual therapy; retrograde massage and scar massage performed to promote improved mobility and decreased edema.  She will continue to be progressed per her tolerance and POC.          Timed:         Manual Therapy:    13     mins  41809;     Therapeutic Exercise:    33     mins  97834;     Neuromuscular Linda:        mins  12526;    Therapeutic Activity:     10     mins  18733;     Gait Training:           mins  54355;     Ultrasound:          mins  20827;    Ionto                                   mins   24110  Self Care                            mins   59531  Canalith Repos         mins 52854      Un-Timed:  Electrical Stimulation:         mins  62743 ( );  Dry Needling          mins self-pay  Traction          mins 91436  Ice-8 min    Timed Treatment:   56   mins    Total Treatment:     64   mins    Elle Mora PT  [place] License: [id number]  Electronically signed by Elle Mora PT, 08/26/22, 9:12 AM EDT.

## 2022-08-29 ENCOUNTER — TREATMENT (OUTPATIENT)
Dept: PHYSICAL THERAPY | Facility: CLINIC | Age: 72
End: 2022-08-29

## 2022-08-29 DIAGNOSIS — G89.29 CHRONIC PAIN OF LEFT KNEE: ICD-10-CM

## 2022-08-29 DIAGNOSIS — M25.662 DECREASED ROM OF LEFT KNEE: ICD-10-CM

## 2022-08-29 DIAGNOSIS — Z96.652 HX OF TOTAL KNEE ARTHROPLASTY, LEFT: Primary | ICD-10-CM

## 2022-08-29 DIAGNOSIS — M25.562 CHRONIC PAIN OF LEFT KNEE: ICD-10-CM

## 2022-08-29 PROCEDURE — 97110 THERAPEUTIC EXERCISES: CPT | Performed by: PHYSICAL THERAPIST

## 2022-08-29 PROCEDURE — 97140 MANUAL THERAPY 1/> REGIONS: CPT | Performed by: PHYSICAL THERAPIST

## 2022-08-29 NOTE — PROGRESS NOTES
"Physical Therapy Daily Treatment Note      Patient: Julieta Frey [facility]   : 1950  Referring practitioner: Shon Murillo MD  Date of Initial Visit: Type: THERAPY  Noted: 2022  Today's Date: 2022  Patient seen for 24 sessions       Visit Diagnoses:    ICD-10-CM ICD-9-CM   1. Hx of total knee arthroplasty, left  Z96.652 V43.65   2. Chronic pain of left knee  M25.562 719.46    G89.29 338.29   3. Decreased ROM of left knee  M25.662 719.56       Subjective Evaluation    History of Present Illness    Subjective comment: Pt reports 0/10 left knee pain prior to today's session. She states she has had swelling the last few days and \"isn't sure why\".Pain  Current pain ratin           Objective   See Exercise, Manual, and Modality Logs for complete treatment.       Assessment & Plan     Assessment    Assessment details: Manual therapy was performed at the initiation of today's session to address inflammation and scar tissue. The patient reported no tenderness with manual therapy. Therapeutic exercises were progressed to include long stepping, tandem stance, and pre-gait. The patient reported no increase in pain with progressed activities. Today's session was assisted by Paul Reno PT, MSPT. Cryotherapy was applied to the left knee at the conclusion of today's session to address inflammation. No skin irritation was observed following modalities. The patient reported 0/10 left knee pain following today's session.    Plan  Plan details: Progress as tolerated for improved function.          Timed:         Manual Therapy:    12     mins  26043;     Therapeutic Exercise:     35    mins  48699;   (6 minutes shared)  Neuromuscular Linda:        mins  77298;    Therapeutic Activity:          mins  73606;     Gait Training:           mins  49999;     Ultrasound:          mins  53242;    Ionto                                   mins   11817  Self Care                            mins   " 41820  [facility]         mins 39257      Un-Timed:  Electrical Stimulation:         mins  48176 ( );  Dry Needling          mins self-pay  Traction          mins 31242      Timed Treatment:   47   mins   Total Treatment:     50   mins (8 minutes cryotherapy)    Ashley Claudene Dalton, PT  [place] License: [id number]

## 2022-09-02 ENCOUNTER — TREATMENT (OUTPATIENT)
Dept: PHYSICAL THERAPY | Facility: CLINIC | Age: 72
End: 2022-09-02

## 2022-09-02 DIAGNOSIS — G89.29 CHRONIC PAIN OF LEFT KNEE: ICD-10-CM

## 2022-09-02 DIAGNOSIS — M25.662 DECREASED ROM OF LEFT KNEE: ICD-10-CM

## 2022-09-02 DIAGNOSIS — Z96.652 HX OF TOTAL KNEE ARTHROPLASTY, LEFT: Primary | ICD-10-CM

## 2022-09-02 DIAGNOSIS — Z98.890 S/P ARTHROSCOPIC PARTIAL MEDIAL MENISCECTOMY: ICD-10-CM

## 2022-09-02 DIAGNOSIS — M62.81 QUADRICEPS WEAKNESS: ICD-10-CM

## 2022-09-02 DIAGNOSIS — M25.562 CHRONIC PAIN OF LEFT KNEE: ICD-10-CM

## 2022-09-02 PROCEDURE — 97140 MANUAL THERAPY 1/> REGIONS: CPT | Performed by: PHYSICAL THERAPIST

## 2022-09-02 PROCEDURE — 97110 THERAPEUTIC EXERCISES: CPT | Performed by: PHYSICAL THERAPIST

## 2022-09-02 NOTE — PROGRESS NOTES
Physical Therapy Daily Treatment Note      Patient: Julieta Frey [facility]   : 1950  Referring practitioner: Shon Murillo MD  Date of Initial Visit: Type: THERAPY  Noted: 2022  Today's Date: 2022  Patient seen for 25 sessions       Visit Diagnoses:    ICD-10-CM ICD-9-CM   1. Hx of total knee arthroplasty, left  Z96.652 V43.65   2. Chronic pain of left knee  M25.562 719.46    G89.29 338.29   3. Decreased ROM of left knee  M25.662 719.56   4. S/P arthroscopic partial medial meniscectomy  Z98.890 V45.89   5. Quadriceps weakness  M62.81 728.87       Subjective Evaluation    Pain  Current pain ratin  At best pain ratin  At worst pain ratin         Patient reports that she is having no pain prior to treatment session. Patient states that she is having no trouble with anything at home. Patient reports that she wants to be discharged.    Objective          Active Range of Motion   Left Knee   Flexion: 121 degrees   Extension: Left knee active extension: lacking 6 degrees from 0.     Strength/Myotome Testing     Left Hip   Planes of Motion   Flexion: 4    Left Knee   Flexion: 4  Extension: 3+      See Exercise, Manual, and Modality Logs for complete treatment.       Assessment/Plan  Patient tolerated treatment session well with rest breaks taken as needed by the patient. Patient demonstrates improvements with L) knee flex and ext ROM and strength. No adverse reactions with treatment session. Patient demonstrated proper technique of HEP. Patient to be discharged at this time. Elle Mora, PT, DPT to do discharge note.     Timed:         Manual Therapy:    13     mins  87392;     Therapeutic Exercise:    35     mins  75227;     Neuromuscular Linda:        mins  23283;    Therapeutic Activity:          mins  07929;     Gait Training:           mins  94082;     Ultrasound:          mins  16422;    Ionto                                   mins   10463  Self Care                             mins   16620  [facility]         mins 24679      Un-Timed:  Electrical Stimulation:         mins  43163 ( );  Dry Needling          mins self-pay  Traction          mins 89145      Timed Treatment:   48   mins   Total Treatment:     48   mins    Marybeth Marquez, PTA  [place] License: [id number]

## 2022-10-04 ENCOUNTER — OFFICE VISIT (OUTPATIENT)
Dept: ORTHOPEDIC SURGERY | Facility: CLINIC | Age: 72
End: 2022-10-04

## 2022-10-04 VITALS
HEIGHT: 61 IN | BODY MASS INDEX: 37 KG/M2 | DIASTOLIC BLOOD PRESSURE: 75 MMHG | WEIGHT: 195.99 LBS | SYSTOLIC BLOOD PRESSURE: 125 MMHG

## 2022-10-04 DIAGNOSIS — I89.0 LYMPHEDEMA OF BOTH LOWER EXTREMITIES: ICD-10-CM

## 2022-10-04 DIAGNOSIS — Z96.652 S/P TOTAL KNEE ARTHROPLASTY, LEFT: Primary | ICD-10-CM

## 2022-10-04 PROCEDURE — 99213 OFFICE O/P EST LOW 20 MIN: CPT | Performed by: ORTHOPAEDIC SURGERY
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
## 2022-11-30 ENCOUNTER — HOSPITAL ENCOUNTER (OUTPATIENT)
Dept: MAMMOGRAPHY | Facility: HOSPITAL | Age: 72
Discharge: HOME | End: 2022-11-30

## 2022-11-30 ENCOUNTER — HOSPITAL ENCOUNTER (OUTPATIENT)
Dept: BONE DENSITY | Facility: HOSPITAL | Age: 72
Discharge: HOME | End: 2022-11-30

## 2022-11-30 DIAGNOSIS — Z78.0 POSTMENOPAUSAL: ICD-10-CM

## 2022-11-30 DIAGNOSIS — R92.8 ABNORMAL MAMMOGRAM: ICD-10-CM

## 2022-11-30 PROCEDURE — 77065 DX MAMMO INCL CAD UNI: CPT | Performed by: RADIOLOGY

## 2022-11-30 PROCEDURE — 77080 DXA BONE DENSITY AXIAL: CPT

## 2022-11-30 PROCEDURE — 77065 DX MAMMO INCL CAD UNI: CPT

## 2022-11-30 PROCEDURE — G0279 TOMOSYNTHESIS, MAMMO: HCPCS | Performed by: RADIOLOGY

## 2022-11-30 PROCEDURE — G0279 TOMOSYNTHESIS, MAMMO: HCPCS

## 2022-11-30 PROCEDURE — 77080 DXA BONE DENSITY AXIAL: CPT | Performed by: RADIOLOGY

## 2022-12-02 ENCOUNTER — TRANSCRIBE ORDERS (OUTPATIENT)
Dept: ONCOLOGY | Facility: HOSPITAL | Age: 72
End: 2022-12-02

## 2022-12-09 PROBLEM — M81.0 OSTEOPOROSIS: Status: ACTIVE | Noted: 2022-12-09

## 2022-12-12 ENCOUNTER — INFUSION (OUTPATIENT)
Dept: ONCOLOGY | Facility: HOSPITAL | Age: 72
End: 2022-12-12

## 2022-12-12 VITALS
TEMPERATURE: 98 F | HEART RATE: 83 BPM | DIASTOLIC BLOOD PRESSURE: 58 MMHG | SYSTOLIC BLOOD PRESSURE: 149 MMHG | RESPIRATION RATE: 18 BRPM | OXYGEN SATURATION: 97 %

## 2022-12-12 DIAGNOSIS — M81.0 OSTEOPOROSIS, UNSPECIFIED OSTEOPOROSIS TYPE, UNSPECIFIED PATHOLOGICAL FRACTURE PRESENCE: Primary | ICD-10-CM

## 2022-12-12 PROCEDURE — 96372 THER/PROPH/DIAG INJ SC/IM: CPT

## 2022-12-12 PROCEDURE — 25010000002 DENOSUMAB 60 MG/ML SOLUTION PREFILLED SYRINGE: Performed by: NURSE PRACTITIONER

## 2022-12-12 RX ADMIN — DENOSUMAB 60 MG: 60 INJECTION SUBCUTANEOUS at 09:55

## 2022-12-19 ENCOUNTER — OFFICE VISIT (OUTPATIENT)
Dept: PULMONOLOGY | Facility: CLINIC | Age: 72
End: 2022-12-19

## 2022-12-19 VITALS
TEMPERATURE: 97.8 F | DIASTOLIC BLOOD PRESSURE: 96 MMHG | WEIGHT: 192 LBS | HEART RATE: 86 BPM | OXYGEN SATURATION: 98 % | SYSTOLIC BLOOD PRESSURE: 172 MMHG | BODY MASS INDEX: 35.33 KG/M2 | HEIGHT: 62 IN

## 2022-12-19 DIAGNOSIS — G47.33 OSA (OBSTRUCTIVE SLEEP APNEA): Primary | ICD-10-CM

## 2022-12-19 DIAGNOSIS — E66.01 CLASS 2 SEVERE OBESITY DUE TO EXCESS CALORIES WITH SERIOUS COMORBIDITY AND BODY MASS INDEX (BMI) OF 35.0 TO 35.9 IN ADULT: ICD-10-CM

## 2022-12-19 PROCEDURE — 99213 OFFICE O/P EST LOW 20 MIN: CPT | Performed by: NURSE PRACTITIONER

## 2022-12-19 NOTE — PROGRESS NOTES
"Chief Complaint  Sleep Apnea and Snoring    Subjective        Julieta Holt presents to [facility]  History of Present Illness      Ms. Holt is a 72 year old female with a medical history significant for hypertension, arthritis, COPD, hyperlipidemia, and seizures.    She presents today for evaluation of sleep apnea.  She tells me that she has daytime fatigue and feels that she could sleep all day.  She states that she has woken herself up snoring at night.  She also wakes up feeling unrested.    Objective   Vital Signs:  /96 (BP Location: Left arm, Patient Position: Sitting)   Pulse 86   Temp 97.8 °F (36.6 °C)   Ht 157.5 cm (62\")   Wt 87.1 kg (192 lb)   SpO2 98%   BMI 35.12 kg/m²   Estimated body mass index is 35.12 kg/m² as calculated from the following:    Height as of this encounter: 157.5 cm (62\").    Weight as of this encounter: 87.1 kg (192 lb).    Class 2 Severe Obesity (BMI >=35 and <=39.9). Obesity-related health conditions include the following: hypertension and dyslipidemias. Obesity is unchanged. BMI is is above average; BMI management plan is completed. We discussed portion control and increasing exercise.      Physical Exam     GENERAL APPEARANCE: Well developed, well nourished, alert and cooperative, and appears to be in no acute distress.    HEAD: normocephalic. Atraumatic.    EYES: PERRL, EOMI. Vision is grossly intact.    THROAT: Oral cavity and pharynx normal. No inflammation, swelling, exudate, or lesions.     NECK: Neck supple.  No thyromegaly.    CARDIAC: Normal S1 and S2. No S3, S4 or murmurs. Rhythm is regular.     RESPIRATORY:Bilateral air entry positive. Bilateral diminished breath sounds. No wheezing, crackles or rhonchi noted.    GI: Positive bowel sounds. Soft, nondistended, nontender.     MUSCULOSKELETAL: No significant deformity or joint abnormality. No edema. Peripheral pulses intact. No " varicosities.    NEUROLOGICAL: Strength and sensation symmetric and intact throughout.     PSYCHIATRIC: The mental examination revealed the patient was oriented to person, place, and time.     Result Review :  The following data was reviewed by: MARY Chu on 12/19/2022:  Common labs    Common Labs 5/20/22 5/20/22 5/20/22 6/22/22    0831 0831 0831    Glucose  105 (A)     BUN  18     Creatinine  0.89     Sodium  143     Potassium  4.6  4.1   Chloride  106     Calcium  9.5     WBC 6.78      Hemoglobin 12.6      Hematocrit 39.2      Platelets 270      Hemoglobin A1C   5.50    (A) Abnormal value                     Assessment and Plan   Diagnoses and all orders for this visit:    1. CHRISTIAN (obstructive sleep apnea) (Primary)  -     Ambulatory Referral to Sleep Lab    2. Class 2 severe obesity due to excess calories with serious comorbidity and body mass index (BMI) of 35.0 to 35.9 in adult (HCC)          Has this patient had a previous sleep study? no   Has the patient had observed apnea during sleep?no   Do experience excessive daytime sleepiness: yes  Do experience habitual snoring, gasping/choking episodes associated with awakenings?yes   Do you have unexplained hypertension?yes   Do you have Nonrestorative sleep yes               STOPBANG score:5  Ordered in lab sleep study.   The patient was extensively educated on the consequences of untreated obstructive sleep apnea namely cardiovascular/metabolic disorder, neurocognitive deficit, daytime sleepiness, motor vehicle accidents, depression, mood disorders and reduced quality of life.  At the end of conversation, the patient voices understanding of the disease process and treatment modality.  Patient also understands the risk of untreated obstructive sleep apnea and benefit benefits of the treatment.    Counseling time was greater than 10 minutes.            Follow Up   Return in about 3 months (around 3/19/2023).  Patient was given instructions and  counseling regarding her condition or for health maintenance advice. Please see specific information pulled into the AVS if appropriate.

## 2023-02-01 PROBLEM — J44.9 COPD (CHRONIC OBSTRUCTIVE PULMONARY DISEASE): Status: RESOLVED | Noted: 2022-06-22 | Resolved: 2023-02-01

## 2023-02-21 ENCOUNTER — OFFICE VISIT (OUTPATIENT)
Dept: CARDIOLOGY | Facility: HOSPITAL | Age: 73
End: 2023-02-21
Payer: MEDICARE

## 2023-02-21 ENCOUNTER — HOSPITAL ENCOUNTER (OUTPATIENT)
Dept: CARDIOLOGY | Facility: HOSPITAL | Age: 73
Discharge: HOME | End: 2023-02-21
Payer: MEDICARE

## 2023-02-21 VITALS
SYSTOLIC BLOOD PRESSURE: 130 MMHG | DIASTOLIC BLOOD PRESSURE: 61 MMHG | TEMPERATURE: 97.9 F | WEIGHT: 196.3 LBS | RESPIRATION RATE: 18 BRPM | BODY MASS INDEX: 36.12 KG/M2 | HEIGHT: 62 IN | HEART RATE: 67 BPM | OXYGEN SATURATION: 98 %

## 2023-02-21 DIAGNOSIS — R00.2 PALPITATIONS: ICD-10-CM

## 2023-02-21 DIAGNOSIS — R06.09 DOE (DYSPNEA ON EXERTION): Primary | ICD-10-CM

## 2023-02-21 DIAGNOSIS — E78.5 DYSLIPIDEMIA: ICD-10-CM

## 2023-02-21 LAB
QT INTERVAL: 454 MS
QTC INTERVAL: 490 MS

## 2023-02-21 PROCEDURE — 99214 OFFICE O/P EST MOD 30 MIN: CPT | Performed by: NURSE PRACTITIONER

## 2023-02-21 PROCEDURE — 93005 ELECTROCARDIOGRAM TRACING: CPT | Performed by: NURSE PRACTITIONER

## 2023-02-21 PROCEDURE — 93010 ELECTROCARDIOGRAM REPORT: CPT | Performed by: INTERNAL MEDICINE

## 2023-02-21 NOTE — PROGRESS NOTES
"Chief Complaint  Chest Pain and Follow-up    Subjective    History of Present Illness {CC  Problem List  Visit  Diagnosis   Encounters  Notes  Medications  Labs  Result Review Imaging  Media :23}       History of Present Illness   72-year-old female presents the office today for ongoing evaluation of her GANNON. Has had one episode of chest pain a few weeks ago that occurred at rest. Patient has not had any chest pain with exertion. Patient has a history of palpitations, hypertension,dyslipidemia, dyspnea, osteoarthritis of left knee, trochanteric bursitis of the left hip and chronic HFrEF with recovered LVEF.  She also has a history of prediabetes and tobacco abuse but quit in 2004 with a 30-pack-year history.  Notes intermittent dyspnea which is at baseline. Does report occasional dizziness or lightheadedness.       Vital Signs:   Vitals:    02/21/23 1100   BP: 130/61   BP Location: Left arm   Patient Position: Sitting   Cuff Size: Adult   Pulse: 67   Resp: 18   Temp: 97.9 °F (36.6 °C)   TempSrc: Temporal   SpO2: 98%   Weight: 89 kg (196 lb 4.8 oz)   Height: 157.5 cm (62\")     Body mass index is 35.9 kg/m².  Physical Exam  Vitals and nursing note reviewed.   Constitutional:       Appearance: Normal appearance.   HENT:      Head: Normocephalic.   Eyes:      Pupils: Pupils are equal, round, and reactive to light.   Cardiovascular:      Rate and Rhythm: Normal rate and regular rhythm.      Pulses: Normal pulses.      Heart sounds: Normal heart sounds. No murmur heard.  Pulmonary:      Effort: Pulmonary effort is normal.      Breath sounds: Normal breath sounds.   Abdominal:      General: Bowel sounds are normal.      Palpations: Abdomen is soft.   Musculoskeletal:         General: Normal range of motion.      Cervical back: Normal range of motion.      Right lower leg: No edema.      Left lower leg: No edema.   Skin:     General: Skin is warm and dry.      Capillary Refill: Capillary refill takes less than 2 " seconds.   Neurological:      Mental Status: She is alert and oriented to person, place, and time.   Psychiatric:         Mood and Affect: Mood normal.         Thought Content: Thought content normal.              Result Review  Data Reviewed:{ Labs  Result Review  Imaging  Med Tab  Media :23}   Vent. Rate :  64 BPM     Atrial Rate :  64 BPM     P-R Int : 174 ms          QRS Dur : 140 ms      QT Int : 478 ms       P-R-T Axes :  38 -29  35 degrees     QTc Int : 493 ms     Normal sinus rhythm  Left bundle branch block  Abnormal ECG  When compared with ECG of 05-OCT-2021 11:19,  Nonspecific T wave abnormality now evident in Lateral leads  Confirmed by MD Israel, Manohar ([id number]) on 5/20/2022 3:58:21 PM    Vent. Rate :  70 BPM     Atrial Rate :  70 BPM     P-R Int : 164 ms          QRS Dur : 152 ms      QT Int : 454 ms       P-R-T Axes :  50 -43  79 degrees     QTc Int : 490 ms     Normal sinus rhythm  Left axis deviation  Left bundle branch block  Abnormal ECG  When compared with ECG of 20-MAY-2022 08:48,  No significant change was found     Referred By: LEVI COHN           Confirmed By:          Assessment and Plan {CC Problem List  Visit Diagnosis  ROS  Review (Popup)  Health Maintenance  Quality  BestPractice  Medications  SmartSets  SnapShot Encounters  Media :23}   1. GANNON   Echo to be scheduled at [facility]   2.  Palpitations  Infrequent   3. Dyslipidemia  Stable on lipitor   Follow Up {Instructions Charge Capture  Follow-up Communications :23}   Return if symptoms worsen or fail to improve.    Patient was given instructions and counseling regarding her condition or for health maintenance advice. Please see specific information pulled into the AVS if appropriate.  Patient was instructed to call the [facility] with any questions, concerns, or worsening symptoms.

## 2023-03-16 ENCOUNTER — HOSPITAL ENCOUNTER (OUTPATIENT)
Dept: CARDIOLOGY | Facility: HOSPITAL | Age: 73
Discharge: HOME | End: 2023-03-16
Admitting: NURSE PRACTITIONER
Payer: MEDICARE

## 2023-03-16 DIAGNOSIS — R06.09 DOE (DYSPNEA ON EXERTION): ICD-10-CM

## 2023-03-16 PROCEDURE — 93306 TTE W/DOPPLER COMPLETE: CPT | Performed by: INTERNAL MEDICINE

## 2023-03-16 PROCEDURE — 93306 TTE W/DOPPLER COMPLETE: CPT

## 2023-03-19 LAB
MAXIMAL PREDICTED HEART RATE: 147 BPM
STRESS TARGET HR: 125 BPM

## 2023-03-20 NOTE — PROGRESS NOTES
Your echo shows that your heart is pumping efficiently as evidenced by an ejection fraction of 61 to 65%.  Trace to mild regurgitation was noted in the aortic and mitral valves

## 2023-04-28 ENCOUNTER — TELEPHONE (OUTPATIENT)
Dept: PULMONOLOGY | Facility: CLINIC | Age: 73
End: 2023-04-28
Payer: MEDICARE

## 2023-04-28 DIAGNOSIS — G47.33 OSA (OBSTRUCTIVE SLEEP APNEA): Primary | ICD-10-CM

## 2023-04-28 NOTE — TELEPHONE ENCOUNTER
----- Message from MARY Chu sent at 4/28/2023  3:50 PM EDT -----  Will you let her know that her sleep study showed moderate sleep apnea.  I am starting her on an autopap.  ----- Message -----  From: Fifi, Norm Incoming  Sent: 4/28/2023   2:21 PM EDT  To: MARY Chu

## 2023-05-01 ENCOUNTER — OFFICE VISIT (OUTPATIENT)
Dept: PULMONOLOGY | Facility: CLINIC | Age: 73
End: 2023-05-01
Payer: MEDICARE

## 2023-05-01 VITALS
TEMPERATURE: 98 F | RESPIRATION RATE: 18 BRPM | OXYGEN SATURATION: 100 % | HEART RATE: 84 BPM | DIASTOLIC BLOOD PRESSURE: 82 MMHG | WEIGHT: 191 LBS | SYSTOLIC BLOOD PRESSURE: 140 MMHG | HEIGHT: 61 IN | BODY MASS INDEX: 36.06 KG/M2

## 2023-05-01 DIAGNOSIS — J44.9 CHRONIC OBSTRUCTIVE PULMONARY DISEASE, UNSPECIFIED COPD TYPE: ICD-10-CM

## 2023-05-01 DIAGNOSIS — E66.01 CLASS 2 SEVERE OBESITY DUE TO EXCESS CALORIES WITH SERIOUS COMORBIDITY AND BODY MASS INDEX (BMI) OF 36.0 TO 36.9 IN ADULT: ICD-10-CM

## 2023-05-01 DIAGNOSIS — G47.33 OSA (OBSTRUCTIVE SLEEP APNEA): Primary | ICD-10-CM

## 2023-05-01 NOTE — PROGRESS NOTES
"Chief Complaint  Sleep Apnea    Subjective        Julieta Tk Holt presents to [facility] PULMONARY [facility]ITICAL CARE MEDICINE  History of Present Illness    Ms. Holt is a 72 year old female with a medical history significant for hypertension, arthritis, COPD, depression, seizure disorder and CHRISTIAN.    She presents today for follow up on sleep apnea.  She underwent sleep study which showed moderate sleep apnea.  Order was placed for autopap but she has not been set up yet.  She is taking Trelegy once daily and albuterol as needed.  She is a former smoker, quitting in 2004.  Objective   Vital Signs:  /82   Pulse 84   Temp 98 °F (36.7 °C) (Temporal)   Resp 18   Ht 154.9 cm (61\")   Wt 86.6 kg (191 lb)   SpO2 100%   BMI 36.09 kg/m²   Estimated body mass index is 36.09 kg/m² as calculated from the following:    Height as of this encounter: 154.9 cm (61\").    Weight as of this encounter: 86.6 kg (191 lb).       Class 2 Severe Obesity (BMI >=35 and <=39.9). Obesity-related health conditions include the following: obstructive sleep apnea and hypertension. Obesity is unchanged. BMI is is above average; BMI management plan is completed. We discussed portion control and increasing exercise.      Physical Exam     GENERAL APPEARANCE: Well developed, well nourished, alert and cooperative, and appears to be in no acute distress.    HEAD: normocephalic. Atraumatic.    EYES: PERRL, EOMI. Vision is grossly intact.    THROAT: Oral cavity and pharynx normal. No inflammation, swelling, exudate, or lesions.     NECK: Neck supple.  No thyromegaly.    CARDIAC: Normal S1 and S2. No S3, S4 or murmurs. Rhythm is regular.     RESPIRATORY:Bilateral air entry positive. Bilateral diminished breath sounds. No wheezing, crackles or rhonchi noted.    GI: Positive bowel sounds. Soft, nondistended, nontender.     MUSCULOSKELETAL: No significant deformity or joint abnormality. No edema. Peripheral pulses " intact. No varicosities.    NEUROLOGICAL: Strength and sensation symmetric and intact throughout.     PSYCHIATRIC: The mental examination revealed the patient was oriented to person, place, and time.     Result Review :  The following data was reviewed by: MARY Chu on 05/01/2023:  Common labs        5/20/2022    08:31 6/22/2022    08:35   Common Labs   Glucose 105      BUN 18      Creatinine 0.89      Sodium 143      Potassium 4.6   4.1     Chloride 106      Calcium 9.5      WBC 6.78      Hemoglobin 12.6      Hematocrit 39.2      Platelets 270      Hemoglobin A1C 5.50                     Assessment and Plan   Diagnoses and all orders for this visit:    1. CHRISTIAN (obstructive sleep apnea) (Primary)    2. Class 2 severe obesity due to excess calories with serious comorbidity and body mass index (BMI) of 36.0 to 36.9 in adult    3. Chronic obstructive pulmonary disease, unspecified COPD type         Moderate sleep apnea.  She was started on an autopap.  She states that she has not been set up yet.  We will see her back she gets set up with her autopap.     Patient was educated on positive airway pressure treatment.  As per CMS guidelines, more than 4 hours on 70% of observed nights is considered adherence. Patient was strongly encouraged to use CPAP as much as possible during sleep as more CPAP use is equal to more benefit. Use of heated humidification in positive airway pressure treatment to improve the adherence to the device.  In case of claustrophobia, we will provide the patient cognitive behavioral therapy and desensitization. Oral appliances use will be discussed with the patient in case of mild to moderate sleep apnea or if the patient with severe disease fail positive airway pressure treatment.       The patient was extensively educated on the consequences of untreated obstructive sleep apnea namely cardiovascular/metabolic disorder, neurocognitive deficit, daytime sleepiness, motor vehicle  accidents, depression, mood disorders and reduced quality of life.  At the end of conversation, the patient voices understanding of the disease process and treatment modality.  Patient also understands the risk of untreated obstructive sleep apnea and benefit benefits of the treatment.    Counseling time was greater than 10 minutes.        Continue albuterol as needed.  Continue Trelegy once daily.          Follow Up   Return in about 3 months (around 8/1/2023).  Patient was given instructions and counseling regarding her condition or for health maintenance advice. Please see specific information pulled into the AVS if appropriate.

## 2023-05-30 ENCOUNTER — HOSPITAL ENCOUNTER (OUTPATIENT)
Dept: MAMMOGRAPHY | Facility: HOSPITAL | Age: 73
Discharge: HOME | End: 2023-05-30
Admitting: NURSE PRACTITIONER

## 2023-05-30 DIAGNOSIS — R92.8 ABNORMAL MAMMOGRAM: ICD-10-CM

## 2023-05-30 PROCEDURE — G0279 TOMOSYNTHESIS, MAMMO: HCPCS | Performed by: RADIOLOGY

## 2023-05-30 PROCEDURE — G0279 TOMOSYNTHESIS, MAMMO: HCPCS

## 2023-05-30 PROCEDURE — 77066 DX MAMMO INCL CAD BI: CPT

## 2023-05-30 PROCEDURE — 77066 DX MAMMO INCL CAD BI: CPT | Performed by: RADIOLOGY

## 2023-06-16 ENCOUNTER — INFUSION (OUTPATIENT)
Dept: ONCOLOGY | Facility: HOSPITAL | Age: 73
End: 2023-06-16
Payer: MEDICARE

## 2023-06-16 VITALS
OXYGEN SATURATION: 95 % | HEART RATE: 86 BPM | TEMPERATURE: 97.9 F | RESPIRATION RATE: 18 BRPM | DIASTOLIC BLOOD PRESSURE: 69 MMHG | SYSTOLIC BLOOD PRESSURE: 137 MMHG

## 2023-06-16 DIAGNOSIS — M81.0 OSTEOPOROSIS, UNSPECIFIED OSTEOPOROSIS TYPE, UNSPECIFIED PATHOLOGICAL FRACTURE PRESENCE: Primary | ICD-10-CM

## 2023-06-16 PROCEDURE — 96372 THER/PROPH/DIAG INJ SC/IM: CPT

## 2023-06-16 PROCEDURE — 25010000002 DENOSUMAB 60 MG/ML SOLUTION PREFILLED SYRINGE: Performed by: NURSE PRACTITIONER

## 2023-06-16 RX ADMIN — DENOSUMAB 60 MG: 60 INJECTION SUBCUTANEOUS at 14:38

## 2023-07-31 ENCOUNTER — TELEPHONE (OUTPATIENT)
Dept: ORTHOPEDIC SURGERY | Facility: CLINIC | Age: 73
End: 2023-07-31
Payer: MEDICARE

## 2023-08-07 ENCOUNTER — TREATMENT (OUTPATIENT)
Dept: PHYSICAL THERAPY | Facility: CLINIC | Age: 73
End: 2023-08-07
Payer: MEDICARE

## 2023-08-07 DIAGNOSIS — M25.672 DECREASED RANGE OF MOTION OF LEFT ANKLE: ICD-10-CM

## 2023-08-07 DIAGNOSIS — M25.572 CHRONIC PAIN OF LEFT ANKLE: Primary | ICD-10-CM

## 2023-08-07 DIAGNOSIS — G89.29 CHRONIC PAIN OF LEFT ANKLE: Primary | ICD-10-CM

## 2023-08-07 PROCEDURE — 97035 APP MDLTY 1+ULTRASOUND EA 15: CPT | Performed by: PHYSICAL THERAPIST

## 2023-08-07 PROCEDURE — 97110 THERAPEUTIC EXERCISES: CPT | Performed by: PHYSICAL THERAPIST

## 2023-08-07 PROCEDURE — 97162 PT EVAL MOD COMPLEX 30 MIN: CPT | Performed by: PHYSICAL THERAPIST
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
## 2023-08-09 ENCOUNTER — TREATMENT (OUTPATIENT)
Dept: PHYSICAL THERAPY | Facility: CLINIC | Age: 73
End: 2023-08-09
Payer: MEDICARE

## 2023-08-09 DIAGNOSIS — M25.572 CHRONIC PAIN OF LEFT ANKLE: Primary | ICD-10-CM

## 2023-08-09 DIAGNOSIS — G89.29 CHRONIC PAIN OF LEFT ANKLE: Primary | ICD-10-CM

## 2023-08-09 DIAGNOSIS — M25.672 DECREASED RANGE OF MOTION OF LEFT ANKLE: ICD-10-CM

## 2023-08-09 PROCEDURE — 97110 THERAPEUTIC EXERCISES: CPT | Performed by: PHYSICAL THERAPIST

## 2023-08-09 PROCEDURE — 97140 MANUAL THERAPY 1/> REGIONS: CPT | Performed by: PHYSICAL THERAPIST

## 2023-08-09 NOTE — PROGRESS NOTES
Physical Therapy Daily Treatment Note      Patient: Julieta Frey [facility]   : 1950  Referring practitioner: Andrés Rodriguez MD  Date of Initial Visit: Type: THERAPY  Noted: 2023  Today's Date: 2023  Patient seen for 2 sessions       Visit Diagnoses:    ICD-10-CM ICD-9-CM   1. Chronic pain of left ankle  M25.572 719.47    G89.29 338.29   2. Decreased range of motion of left ankle  M25.672 719.57       Subjective: Patient reports 5/10 left ankle pain prior to tx. Pt verbalizes compliance of initiating home program w/ no complaints. Patient states her right ankle is bothering her more than the left this morning.     Objective   See Exercise, Manual, and Modality Logs for complete treatment.       Assessment/Plan: Patient responded well to today's session with no complaints of pain increase following. Treatment initiated with manual therapy to L) foot/ ankle f/b therex as listed; pt denied cryotherapy at conclusion. Therex performed with focus on improved left ankle ROM, improved ankle and intrinsic strength, as tolerated. Pt observed with good tolerance and was provided with intermittent cues and demonstration for form and for max benefit. Pt noted with tightness along achilles/gastorc region during manual therapy. Pt continues to benefit from therapy services and will be progressed as tolerated to address goals, reduce pain and improve function. No adverse reactions or signs of distress observed during, and/ or following tx. Continue with PT's POC.       Timed:         Manual Therapy:    14     mins  11679;     Therapeutic Exercise:     27    mins  54998;     Neuromuscular Linda:        mins  79103;    Therapeutic Activity:          mins  56936;     Gait Training:           mins  29869;     Ultrasound:          mins  29963;    Ionto                                   mins   67542  Self Care                            mins   81612      Un-Timed:  Electrical Stimulation:         mins  43081 (  );  Dry Needling          mins self-pay  Traction          mins 41355  Canalith Repos         mins 95164    Timed Treatment:  41    mins   Total Treatment:     41   mins    Sophie Lazaro. HERI Rosa  [place] License: [id number]

## 2023-08-14 ENCOUNTER — TREATMENT (OUTPATIENT)
Dept: PHYSICAL THERAPY | Facility: CLINIC | Age: 73
End: 2023-08-14
Payer: MEDICARE

## 2023-08-14 DIAGNOSIS — G89.29 CHRONIC PAIN OF LEFT ANKLE: Primary | ICD-10-CM

## 2023-08-14 DIAGNOSIS — M25.572 CHRONIC PAIN OF LEFT ANKLE: Primary | ICD-10-CM

## 2023-08-14 DIAGNOSIS — M25.672 DECREASED RANGE OF MOTION OF LEFT ANKLE: ICD-10-CM

## 2023-08-14 PROCEDURE — 97110 THERAPEUTIC EXERCISES: CPT | Performed by: PHYSICAL THERAPIST

## 2023-08-14 PROCEDURE — 97140 MANUAL THERAPY 1/> REGIONS: CPT | Performed by: PHYSICAL THERAPIST

## 2023-08-14 NOTE — PROGRESS NOTES
Physical Therapy Daily Treatment Note      Patient: Julieta Frey [facility]   : 1950  Referring practitioner: Andrés Rodriguez MD  Date of Initial Visit: Type: THERAPY  Noted: 2023  Today's Date: 2023  Patient seen for 3 sessions       Visit Diagnoses:    ICD-10-CM ICD-9-CM   1. Chronic pain of left ankle  M25.572 719.47    G89.29 338.29   2. Decreased range of motion of left ankle  M25.672 719.57       Subjective: Patient reports 7-8/10 left foot pain prior to tx. Pt states she had difficulty sleeping night before last due to pain in the foot. Pt notes she tolerated last session well with good response.     Objective   See Exercise, Manual, and Modality Logs for complete treatment.       Assessment/Plan: Today's treatment initiated with manual therapy including soft tissue mobilization/ retrograde to left foot/ ankle to assist with pain control and improve mobility f/b therex as listed and cryotherapy at conclusion. Therex completed with continued focus on improved range of motion, strength and stability. Exercise progressed with strengthening reps increased as tolerated. Pt continues to require cues during activities to maintain for and for max benefit. Slight increase in soreness following. Pt will be progressed with therapy as tolerated to address goals and reduce pain. No adverse reactions observed during, and/ or following tx. Continue with PT's POC.       Timed:         Manual Therapy:   14      mins  22900;     Therapeutic Exercise:   30      mins  63173;     Neuromuscular Linda:        mins  54702;    Therapeutic Activity:          mins  65136;     Gait Training:           mins  72818;     Ultrasound:          mins  11226;    Ionto                                   mins   81701  Self Care                            mins   13144      Un-Timed:  Electrical Stimulation:         mins  31337 ( );  Dry Needling          mins self-pay  Traction          mins 64582  [facility]          mins 96949    Timed Treatment:  44    mins   Total Treatment:     50   mins (CP: x 6 min)     Sophie Lazaro. Shelley, PTA  [place] License: [id number]

## 2023-08-16 ENCOUNTER — TREATMENT (OUTPATIENT)
Dept: PHYSICAL THERAPY | Facility: CLINIC | Age: 73
End: 2023-08-16
Payer: MEDICARE

## 2023-08-16 DIAGNOSIS — G89.29 CHRONIC PAIN OF LEFT ANKLE: Primary | ICD-10-CM

## 2023-08-16 DIAGNOSIS — M25.572 CHRONIC PAIN OF LEFT ANKLE: Primary | ICD-10-CM

## 2023-08-16 DIAGNOSIS — M25.672 DECREASED RANGE OF MOTION OF LEFT ANKLE: ICD-10-CM

## 2023-08-16 PROCEDURE — 97110 THERAPEUTIC EXERCISES: CPT | Performed by: PHYSICAL THERAPIST

## 2023-08-16 PROCEDURE — 97140 MANUAL THERAPY 1/> REGIONS: CPT | Performed by: PHYSICAL THERAPIST

## 2023-08-16 NOTE — PROGRESS NOTES
Physical Therapy Daily Treatment Note      Patient: Julieta Frey [facility]   : 1950  Referring practitioner: Andrés Rodriguez MD  Date of Initial Visit: Type: THERAPY  Noted: 2023  Today's Date: 2023  Patient seen for 4 sessions       Visit Diagnoses:    ICD-10-CM ICD-9-CM   1. Chronic pain of left ankle  M25.572 719.47    G89.29 338.29   2. Decreased range of motion of left ankle  M25.672 719.57       Subjective: Patient reports 4/10 left ankle pain prior to tx. Pt states she feels the swelling has decreased some in the foot.      Objective   See Exercise, Manual, and Modality Logs for complete treatment.       Assessment/Plan: Patient completed today's session with reports of slight decrease in pain following, 3/10. Treatment consisted of therex per flow sheet f/b manual therapy including soft tissue mobilization and retrograde; cryotherapy at conclusion. Exercise progressed with soleus stretch and lateral step ups initiated to assist with ROM and strength deficits. Pt observed with good tolerance and was provided with cues and demonstration for form. Pt continues to display tightness in achilles/ gastroc region. Pt will be progressed with therapy as tolerated. No adverse reactions observed during, and/ or following tx. Continue with PT's POC.       Timed:         Manual Therapy:   12      mins  40289;     Therapeutic Exercise:    36     mins  40166;     Neuromuscular Linda:        mins  74914;    Therapeutic Activity:          mins  34251;     Gait Training:           mins  41757;     Ultrasound:          mins  39941;    Ionto                                   mins   88094  Self Care                            mins   19601      Un-Timed:  Electrical Stimulation:         mins  64897 ( );  Dry Needling          mins self-pay  Traction          mins 06171  Canalith Repos         mins 09012    Timed Treatment:  48    mins   Total Treatment:    54    mins (CP: x 6 min)     Sophie Lazaro.  Shelley, PTA  [place] License: [id number]

## 2023-08-21 ENCOUNTER — TREATMENT (OUTPATIENT)
Dept: PHYSICAL THERAPY | Facility: CLINIC | Age: 73
End: 2023-08-21
Payer: MEDICARE

## 2023-08-21 DIAGNOSIS — G89.29 CHRONIC PAIN OF LEFT ANKLE: Primary | ICD-10-CM

## 2023-08-21 DIAGNOSIS — M25.572 CHRONIC PAIN OF LEFT ANKLE: Primary | ICD-10-CM

## 2023-08-21 DIAGNOSIS — M25.672 DECREASED RANGE OF MOTION OF LEFT ANKLE: ICD-10-CM

## 2023-08-21 PROCEDURE — 97110 THERAPEUTIC EXERCISES: CPT | Performed by: PHYSICAL THERAPIST

## 2023-08-21 PROCEDURE — 97140 MANUAL THERAPY 1/> REGIONS: CPT | Performed by: PHYSICAL THERAPIST

## 2023-08-21 NOTE — PROGRESS NOTES
Physical Therapy Daily Treatment Note      Patient: Julieta Frey [facility]   : 1950  Referring practitioner: Andrés Rodriguez MD  Date of Initial Visit: Type: THERAPY  Noted: 2023  Today's Date: 2023  Patient seen for 5 sessions       Visit Diagnoses:    ICD-10-CM ICD-9-CM   1. Chronic pain of left ankle  M25.572 719.47    G89.29 338.29   2. Decreased range of motion of left ankle  M25.672 719.57       Subjective Evaluation    History of Present Illness    Subjective comment: Pt reports having 3/10 pain today.     Objective   See Exercise, Manual, and Modality Logs for complete treatment.       Assessment & Plan       Assessment  Assessment details: Tx today consisted of exercises and proprioceptive training; followed by stm to foot and ankle and ended with ice.  Pt demonstrated good effort today with noted impaired balance with tandem standing.  Pt responded well to added gtb today.  Pt responded well to tx with no pain following.    Plan  Plan details: Will follow progressing ankle stability and function.          Timed:         Manual Therapy:    14     mins  59275;     Therapeutic Exercise:    27     mins  22918;     Neuromuscular Linda:        mins  36551;    Therapeutic Activity:          mins  74074;     Gait Training:           mins  14943;     Ultrasound:          mins  45544;    Ionto                                   mins   16403  Self Care                            mins   19628  Canalith Repos         mins 27507      Un-Timed:  Electrical Stimulation:         mins  36269 ( );  Dry Needling          mins self-pay  Traction          mins 76001      Timed Treatment:   41   mins   Total Treatment:     47   mins(6min)    Paul Reno PT  [place] License: [id number]      Electronically signed by Paul Reno PT, 23, 8:26 AM EDT

## 2023-08-24 ENCOUNTER — TREATMENT (OUTPATIENT)
Dept: PHYSICAL THERAPY | Facility: CLINIC | Age: 73
End: 2023-08-24
Payer: MEDICARE

## 2023-08-24 DIAGNOSIS — M25.672 DECREASED RANGE OF MOTION OF LEFT ANKLE: ICD-10-CM

## 2023-08-24 DIAGNOSIS — G89.29 CHRONIC PAIN OF LEFT ANKLE: Primary | ICD-10-CM

## 2023-08-24 DIAGNOSIS — M25.572 CHRONIC PAIN OF LEFT ANKLE: Primary | ICD-10-CM

## 2023-08-24 PROCEDURE — 97140 MANUAL THERAPY 1/> REGIONS: CPT | Performed by: PHYSICAL THERAPIST

## 2023-08-24 PROCEDURE — 97110 THERAPEUTIC EXERCISES: CPT | Performed by: PHYSICAL THERAPIST

## 2023-08-24 NOTE — PROGRESS NOTES
Physical Therapy Daily Treatment Note      Patient: Julieta Frey [facility]   : 1950  Referring practitioner: Andrés Rodriguez MD  Date of Initial Visit: Type: THERAPY  Noted: 2023  Today's Date: 2023  Patient seen for 6 sessions       Visit Diagnoses:    ICD-10-CM ICD-9-CM   1. Chronic pain of left ankle  M25.572 719.47    G89.29 338.29   2. Decreased range of motion of left ankle  M25.672 719.57       Subjective Evaluation    History of Present Illness    Subjective comment: Patient reports that she has increasd pain today, noting that she believes it is from the weather changes and arthritis.Pain  Current pain ratin         Objective   See Exercise, Manual, and Modality Logs for complete treatment.       Assessment & Plan       Assessment  Assessment details: Therapy session initiated with manual therapy to the left foot/ankle to encourage improved ankle ROM, decreased pain, and decreased edema.  Patient noted tenderness at left anterior tibialis during STM.  There ex reduced, secondary to elevated pain levels today and increased pain/discomfort with standing.  Patient educated to perform there ex per her tolerance, with patient verbalizing understanding.  Session concluded with cryotherapy, with no skin irritation.  She noted no change in pain at conclusion of session.  She will continue to be progressed per her tolerance and POC.        Timed:         Manual Therapy:    15     mins  37726;     Therapeutic Exercise:    26     mins  34009;     Neuromuscular Linda:        mins  32112;    Therapeutic Activity:          mins  96944;     Gait Training:           mins  26391;     Ultrasound:          mins  36217;    Ionto                                   mins   35428  Self Care                            mins   39938      Un-Timed:  Electrical Stimulation:         mins  77861 ( );  Dry Needling          mins self-pay  Traction          mins 99756  Canalith Repos         mins  09901  Ice-6 min    Timed Treatment:   41   mins   Total Treatment:     47   mins    Elle Mora PT  [place] License: [id number]  Electronically signed by Elle Mora PT, 08/24/23, 10:03 AM EDT.

## 2023-08-28 ENCOUNTER — TREATMENT (OUTPATIENT)
Dept: PHYSICAL THERAPY | Facility: CLINIC | Age: 73
End: 2023-08-28
Payer: MEDICARE

## 2023-08-28 DIAGNOSIS — M25.672 DECREASED RANGE OF MOTION OF LEFT ANKLE: ICD-10-CM

## 2023-08-28 DIAGNOSIS — M25.572 CHRONIC PAIN OF LEFT ANKLE: Primary | ICD-10-CM

## 2023-08-28 DIAGNOSIS — G89.29 CHRONIC PAIN OF LEFT ANKLE: Primary | ICD-10-CM

## 2023-08-28 PROCEDURE — 97110 THERAPEUTIC EXERCISES: CPT | Performed by: PHYSICAL THERAPIST

## 2023-08-28 PROCEDURE — 97140 MANUAL THERAPY 1/> REGIONS: CPT | Performed by: PHYSICAL THERAPIST

## 2023-08-28 NOTE — PROGRESS NOTES
Physical Therapy Daily Treatment Note      Patient: Julieta Frey [facility]   : 1950  Referring practitioner: Andrés Rodriguez MD  Date of Initial Visit: Type: THERAPY  Noted: 2023  Today's Date: 2023  Patient seen for 7 sessions       Visit Diagnoses:    ICD-10-CM ICD-9-CM   1. Chronic pain of left ankle  M25.572 719.47    G89.29 338.29   2. Decreased range of motion of left ankle  M25.672 719.57       Subjective: Patient reports 5/10 pain prior to tx.  Patient states she feels some of the stiffness in her foot has improved since beginning therapy.     Objective   See Exercise, Manual, and Modality Logs for complete treatment.       Assessment/Plan: Patient completed today's session with no complaints of increase in pain following, 5/10. Treatment initiated with manual therapy to left foot/ ankle to assist with reduced edema and improved mobility f/b therex as listed and cryotherapy at conclusion. Exercise progressed with lateral step ups progressed from 4 to 6 inch with good tolerance observed. Pt provided with cues and demonstration as needed during activities for form and for max benefit. Pt continues to benefit from therapy services and will be progressed as tolerated to address goals, reduce pain and improve tolerance to standing. No adverse reactions observed during, and/ or following tx. Continue with PT's POC.       Timed:         Manual Therapy:    13     mins  09135;     Therapeutic Exercise:    28     mins  76486;     Neuromuscular Linda:        mins  35231;    Therapeutic Activity:          mins  08347;     Gait Training:           mins  32987;     Ultrasound:          mins  93699;    Ionto                                   mins   27950  Self Care                            mins   88454      Un-Timed:  Electrical Stimulation:         mins  09542 ( );  Dry Needling          mins self-pay  Traction          mins 31531  Canalith Repos         mins 23086    Timed Treatment:   41    mins   Total Treatment:     47   mins (CP: x 6 min)     Sophie Lazaro. Shelley, HERI  [place] License: [id number]

## 2023-09-06 ENCOUNTER — HOSPITAL ENCOUNTER (OUTPATIENT)
Facility: HOSPITAL | Age: 73
Setting detail: OBSERVATION
Discharge: HOME | End: 2023-09-07
Attending: STUDENT IN AN ORGANIZED HEALTH CARE EDUCATION/TRAINING PROGRAM | Admitting: STUDENT IN AN ORGANIZED HEALTH CARE EDUCATION/TRAINING PROGRAM
Payer: MEDICARE

## 2023-09-06 ENCOUNTER — APPOINTMENT (OUTPATIENT)
Dept: GENERAL RADIOLOGY | Facility: HOSPITAL | Age: 73
End: 2023-09-06
Payer: MEDICARE

## 2023-09-06 ENCOUNTER — TELEPHONE (OUTPATIENT)
Dept: PHYSICAL THERAPY | Facility: CLINIC | Age: 73
End: 2023-09-06
Payer: MEDICARE

## 2023-09-06 DIAGNOSIS — R00.1 BRADYCARDIA: Primary | ICD-10-CM

## 2023-09-06 DIAGNOSIS — R55 NEAR SYNCOPE: ICD-10-CM

## 2023-09-06 LAB
ALBUMIN SERPL-MCNC: 4.3 G/DL (ref 3.5–5.2)
ALBUMIN/GLOB SERPL: 1.4 G/DL
ALP SERPL-CCNC: 131 U/L (ref 39–117)
ALT SERPL W P-5'-P-CCNC: 48 U/L (ref 1–33)
ANION GAP SERPL CALCULATED.3IONS-SCNC: 12.9 MMOL/L (ref 5–15)
APTT PPP: 29.2 SECONDS (ref 26.5–34.5)
AST SERPL-CCNC: 22 U/L (ref 1–32)
BACTERIA UR QL AUTO: NORMAL /HPF
BASOPHILS # BLD AUTO: 0.03 10*3/MM3 (ref 0–0.2)
BASOPHILS NFR BLD AUTO: 0.3 % (ref 0–1.5)
BILIRUB SERPL-MCNC: 0.4 MG/DL (ref 0–1.2)
BILIRUB UR QL STRIP: NEGATIVE
BUN SERPL-MCNC: 16 MG/DL (ref 8–23)
BUN/CREAT SERPL: 15.7 (ref 7–25)
CALCIUM SPEC-SCNC: 10.3 MG/DL (ref 8.6–10.5)
CHLORIDE SERPL-SCNC: 103 MMOL/L (ref 98–107)
CLARITY UR: CLEAR
CO2 SERPL-SCNC: 25.1 MMOL/L (ref 22–29)
COLOR UR: YELLOW
CREAT SERPL-MCNC: 1.02 MG/DL (ref 0.57–1)
CRP SERPL-MCNC: <0.3 MG/DL (ref 0–0.5)
D-LACTATE SERPL-SCNC: 1.8 MMOL/L (ref 0.5–2)
D-LACTATE SERPL-SCNC: 2.2 MMOL/L (ref 0.5–2)
D-LACTATE SERPL-SCNC: 4.1 MMOL/L (ref 0.5–2)
DEPRECATED RDW RBC AUTO: 47.7 FL (ref 37–54)
EGFRCR SERPLBLD CKD-EPI 2021: 58.2 ML/MIN/1.73
EOSINOPHIL # BLD AUTO: 0.24 10*3/MM3 (ref 0–0.4)
EOSINOPHIL NFR BLD AUTO: 2.8 % (ref 0.3–6.2)
ERYTHROCYTE [DISTWIDTH] IN BLOOD BY AUTOMATED COUNT: 13.7 % (ref 12.3–15.4)
ERYTHROCYTE [SEDIMENTATION RATE] IN BLOOD: 15 MM/HR (ref 0–30)
GEN 5 2HR TROPONIN T REFLEX: 11 NG/L
GLOBULIN UR ELPH-MCNC: 3.1 GM/DL
GLUCOSE BLDC GLUCOMTR-MCNC: 125 MG/DL (ref 70–130)
GLUCOSE SERPL-MCNC: 121 MG/DL (ref 65–99)
GLUCOSE UR STRIP-MCNC: NEGATIVE MG/DL
HBA1C MFR BLD: 5.7 % (ref 4.8–5.6)
HCT VFR BLD AUTO: 37.9 % (ref 34–46.6)
HGB BLD-MCNC: 11.3 G/DL (ref 12–15.9)
HGB UR QL STRIP.AUTO: NEGATIVE
HOLD SPECIMEN: NORMAL
HOLD SPECIMEN: NORMAL
HYALINE CASTS UR QL AUTO: NORMAL /LPF
IMM GRANULOCYTES # BLD AUTO: 0.04 10*3/MM3 (ref 0–0.05)
IMM GRANULOCYTES NFR BLD AUTO: 0.5 % (ref 0–0.5)
INR PPP: 0.94 (ref 0.9–1.1)
KETONES UR QL STRIP: NEGATIVE
LEUKOCYTE ESTERASE UR QL STRIP.AUTO: ABNORMAL
LIPASE SERPL-CCNC: 32 U/L (ref 13–60)
LYMPHOCYTES # BLD AUTO: 2.15 10*3/MM3 (ref 0.7–3.1)
LYMPHOCYTES NFR BLD AUTO: 24.7 % (ref 19.6–45.3)
MAGNESIUM SERPL-MCNC: 2.1 MG/DL (ref 1.6–2.4)
MCH RBC QN AUTO: 28 PG (ref 26.6–33)
MCHC RBC AUTO-ENTMCNC: 29.8 G/DL (ref 31.5–35.7)
MCV RBC AUTO: 94 FL (ref 79–97)
MONOCYTES # BLD AUTO: 0.7 10*3/MM3 (ref 0.1–0.9)
MONOCYTES NFR BLD AUTO: 8 % (ref 5–12)
NEUTROPHILS NFR BLD AUTO: 5.54 10*3/MM3 (ref 1.7–7)
NEUTROPHILS NFR BLD AUTO: 63.7 % (ref 42.7–76)
NITRITE UR QL STRIP: NEGATIVE
NRBC BLD AUTO-RTO: 0 /100 WBC (ref 0–0.2)
NT-PROBNP SERPL-MCNC: 202.3 PG/ML (ref 0–900)
PH UR STRIP.AUTO: 6 [PH] (ref 5–8)
PLATELET # BLD AUTO: 212 10*3/MM3 (ref 140–450)
PMV BLD AUTO: 10.6 FL (ref 6–12)
POTASSIUM SERPL-SCNC: 3.8 MMOL/L (ref 3.5–5.2)
PROCALCITONIN SERPL-MCNC: 0.04 NG/ML (ref 0–0.25)
PROT SERPL-MCNC: 7.4 G/DL (ref 6–8.5)
PROT UR QL STRIP: NEGATIVE
PROTHROMBIN TIME: 13.1 SECONDS (ref 12.1–14.7)
RBC # BLD AUTO: 4.03 10*6/MM3 (ref 3.77–5.28)
RBC # UR STRIP: NORMAL /HPF
REF LAB TEST METHOD: NORMAL
SODIUM SERPL-SCNC: 141 MMOL/L (ref 136–145)
SP GR UR STRIP: 1.02 (ref 1–1.03)
SQUAMOUS #/AREA URNS HPF: NORMAL /HPF
T4 FREE SERPL-MCNC: 1.85 NG/DL (ref 0.93–1.7)
TROPONIN T DELTA: -2 NG/L
TROPONIN T SERPL HS-MCNC: 13 NG/L
TSH SERPL DL<=0.05 MIU/L-ACNC: 0.96 UIU/ML (ref 0.27–4.2)
UROBILINOGEN UR QL STRIP: ABNORMAL
WBC # UR STRIP: NORMAL /HPF
WBC NRBC COR # BLD: 8.7 10*3/MM3 (ref 3.4–10.8)
WHOLE BLOOD HOLD COAG: NORMAL
WHOLE BLOOD HOLD SPECIMEN: NORMAL

## 2023-09-06 PROCEDURE — 96372 THER/PROPH/DIAG INJ SC/IM: CPT

## 2023-09-06 PROCEDURE — 84484 ASSAY OF TROPONIN QUANT: CPT | Performed by: PHYSICIAN ASSISTANT

## 2023-09-06 PROCEDURE — 25010000002 ENOXAPARIN PER 10 MG

## 2023-09-06 PROCEDURE — 36415 COLL VENOUS BLD VENIPUNCTURE: CPT

## 2023-09-06 PROCEDURE — 71045 X-RAY EXAM CHEST 1 VIEW: CPT

## 2023-09-06 PROCEDURE — G0378 HOSPITAL OBSERVATION PER HR: HCPCS

## 2023-09-06 PROCEDURE — 87040 BLOOD CULTURE FOR BACTERIA: CPT | Performed by: PHYSICIAN ASSISTANT

## 2023-09-06 PROCEDURE — 93010 ELECTROCARDIOGRAM REPORT: CPT | Performed by: INTERNAL MEDICINE

## 2023-09-06 PROCEDURE — 83690 ASSAY OF LIPASE: CPT | Performed by: PHYSICIAN ASSISTANT

## 2023-09-06 PROCEDURE — 71045 X-RAY EXAM CHEST 1 VIEW: CPT | Performed by: RADIOLOGY

## 2023-09-06 PROCEDURE — 83036 HEMOGLOBIN GLYCOSYLATED A1C: CPT | Performed by: PHYSICIAN ASSISTANT

## 2023-09-06 PROCEDURE — 83735 ASSAY OF MAGNESIUM: CPT | Performed by: PHYSICIAN ASSISTANT

## 2023-09-06 PROCEDURE — 81001 URINALYSIS AUTO W/SCOPE: CPT | Performed by: PHYSICIAN ASSISTANT

## 2023-09-06 PROCEDURE — 99222 1ST HOSP IP/OBS MODERATE 55: CPT | Performed by: INTERNAL MEDICINE

## 2023-09-06 PROCEDURE — 93005 ELECTROCARDIOGRAM TRACING: CPT | Performed by: PHYSICIAN ASSISTANT

## 2023-09-06 PROCEDURE — 82948 REAGENT STRIP/BLOOD GLUCOSE: CPT

## 2023-09-06 PROCEDURE — 83880 ASSAY OF NATRIURETIC PEPTIDE: CPT | Performed by: PHYSICIAN ASSISTANT

## 2023-09-06 PROCEDURE — 84439 ASSAY OF FREE THYROXINE: CPT | Performed by: PHYSICIAN ASSISTANT

## 2023-09-06 PROCEDURE — 86140 C-REACTIVE PROTEIN: CPT | Performed by: PHYSICIAN ASSISTANT

## 2023-09-06 PROCEDURE — 94799 UNLISTED PULMONARY SVC/PX: CPT

## 2023-09-06 PROCEDURE — 94664 DEMO&/EVAL PT USE INHALER: CPT

## 2023-09-06 PROCEDURE — 84443 ASSAY THYROID STIM HORMONE: CPT | Performed by: PHYSICIAN ASSISTANT

## 2023-09-06 PROCEDURE — 99284 EMERGENCY DEPT VISIT MOD MDM: CPT

## 2023-09-06 PROCEDURE — 85610 PROTHROMBIN TIME: CPT | Performed by: PHYSICIAN ASSISTANT

## 2023-09-06 PROCEDURE — 80053 COMPREHEN METABOLIC PANEL: CPT | Performed by: PHYSICIAN ASSISTANT

## 2023-09-06 PROCEDURE — 94640 AIRWAY INHALATION TREATMENT: CPT

## 2023-09-06 PROCEDURE — 83605 ASSAY OF LACTIC ACID: CPT | Performed by: PHYSICIAN ASSISTANT

## 2023-09-06 PROCEDURE — 85652 RBC SED RATE AUTOMATED: CPT | Performed by: PHYSICIAN ASSISTANT

## 2023-09-06 PROCEDURE — 85730 THROMBOPLASTIN TIME PARTIAL: CPT | Performed by: PHYSICIAN ASSISTANT

## 2023-09-06 PROCEDURE — 84145 PROCALCITONIN (PCT): CPT | Performed by: PHYSICIAN ASSISTANT

## 2023-09-06 PROCEDURE — 85025 COMPLETE CBC W/AUTO DIFF WBC: CPT | Performed by: PHYSICIAN ASSISTANT

## 2023-09-06 RX ORDER — ASPIRIN 81 MG/1
81 TABLET ORAL DAILY
Status: DISCONTINUED | OUTPATIENT
Start: 2023-09-07 | End: 2023-09-07 | Stop reason: HOSPADM

## 2023-09-06 RX ORDER — SODIUM CHLORIDE 0.9 % (FLUSH) 0.9 %
10 SYRINGE (ML) INJECTION AS NEEDED
Status: DISCONTINUED | OUTPATIENT
Start: 2023-09-06 | End: 2023-09-07 | Stop reason: HOSPADM

## 2023-09-06 RX ORDER — ENOXAPARIN SODIUM 100 MG/ML
40 INJECTION SUBCUTANEOUS NIGHTLY
Status: DISCONTINUED | OUTPATIENT
Start: 2023-09-06 | End: 2023-09-07 | Stop reason: HOSPADM

## 2023-09-06 RX ORDER — FLUTICASONE FUROATE, UMECLIDINIUM BROMIDE AND VILANTEROL TRIFENATATE 100; 62.5; 25 UG/1; UG/1; UG/1
1 POWDER RESPIRATORY (INHALATION)
COMMUNITY

## 2023-09-06 RX ORDER — BUSPIRONE HYDROCHLORIDE 10 MG/1
10 TABLET ORAL 3 TIMES DAILY
Status: ON HOLD | COMMUNITY
End: 2023-09-06

## 2023-09-06 RX ORDER — LISINOPRIL 10 MG/1
10 TABLET ORAL 2 TIMES DAILY
COMMUNITY
End: 2023-09-07 | Stop reason: HOSPADM

## 2023-09-06 RX ORDER — NICOTINE POLACRILEX 2 MG
1 GUM BUCCAL DAILY
COMMUNITY

## 2023-09-06 RX ORDER — POLYETHYLENE GLYCOL 3350 17 G/17G
17 POWDER, FOR SOLUTION ORAL DAILY PRN
Status: DISCONTINUED | OUTPATIENT
Start: 2023-09-06 | End: 2023-09-07 | Stop reason: HOSPADM

## 2023-09-06 RX ORDER — NITROGLYCERIN 0.4 MG/1
0.4 TABLET SUBLINGUAL
COMMUNITY

## 2023-09-06 RX ORDER — FUROSEMIDE 20 MG/1
20 TABLET ORAL DAILY
COMMUNITY
End: 2023-09-07 | Stop reason: HOSPADM

## 2023-09-06 RX ORDER — ATORVASTATIN CALCIUM 10 MG/1
10 TABLET, FILM COATED ORAL NIGHTLY
Status: DISCONTINUED | OUTPATIENT
Start: 2023-09-06 | End: 2023-09-07 | Stop reason: HOSPADM

## 2023-09-06 RX ORDER — BISACODYL 5 MG/1
5 TABLET, DELAYED RELEASE ORAL DAILY PRN
Status: DISCONTINUED | OUTPATIENT
Start: 2023-09-06 | End: 2023-09-07 | Stop reason: HOSPADM

## 2023-09-06 RX ORDER — ACETAMINOPHEN 325 MG/1
650 TABLET ORAL EVERY 6 HOURS PRN
Status: DISCONTINUED | OUTPATIENT
Start: 2023-09-06 | End: 2023-09-07 | Stop reason: HOSPADM

## 2023-09-06 RX ORDER — BUSPIRONE HYDROCHLORIDE 10 MG/1
10 TABLET ORAL EVERY MORNING
COMMUNITY

## 2023-09-06 RX ORDER — ATORVASTATIN CALCIUM 10 MG/1
10 TABLET, FILM COATED ORAL NIGHTLY
COMMUNITY

## 2023-09-06 RX ORDER — SODIUM CHLORIDE, SODIUM LACTATE, POTASSIUM CHLORIDE, CALCIUM CHLORIDE 600; 310; 30; 20 MG/100ML; MG/100ML; MG/100ML; MG/100ML
100 INJECTION, SOLUTION INTRAVENOUS CONTINUOUS
Status: DISCONTINUED | OUTPATIENT
Start: 2023-09-06 | End: 2023-09-07

## 2023-09-06 RX ORDER — ALBUTEROL SULFATE 2.5 MG/3ML
2.5 SOLUTION RESPIRATORY (INHALATION) EVERY 6 HOURS PRN
Status: DISCONTINUED | OUTPATIENT
Start: 2023-09-06 | End: 2023-09-07 | Stop reason: HOSPADM

## 2023-09-06 RX ORDER — DENOSUMAB 60 MG/ML
60 INJECTION SUBCUTANEOUS
COMMUNITY

## 2023-09-06 RX ORDER — BUSPIRONE HYDROCHLORIDE 10 MG/1
10 TABLET ORAL 3 TIMES DAILY
Status: DISCONTINUED | OUTPATIENT
Start: 2023-09-06 | End: 2023-09-07 | Stop reason: HOSPADM

## 2023-09-06 RX ORDER — SODIUM CHLORIDE 9 MG/ML
40 INJECTION, SOLUTION INTRAVENOUS AS NEEDED
Status: DISCONTINUED | OUTPATIENT
Start: 2023-09-06 | End: 2023-09-07 | Stop reason: HOSPADM

## 2023-09-06 RX ORDER — SODIUM CHLORIDE 0.9 % (FLUSH) 0.9 %
10 SYRINGE (ML) INJECTION EVERY 12 HOURS SCHEDULED
Status: DISCONTINUED | OUTPATIENT
Start: 2023-09-06 | End: 2023-09-07 | Stop reason: HOSPADM

## 2023-09-06 RX ORDER — BISACODYL 10 MG
10 SUPPOSITORY, RECTAL RECTAL DAILY PRN
Status: DISCONTINUED | OUTPATIENT
Start: 2023-09-06 | End: 2023-09-07 | Stop reason: HOSPADM

## 2023-09-06 RX ORDER — ALBUTEROL SULFATE 2.5 MG/3ML
2.5 SOLUTION RESPIRATORY (INHALATION) EVERY 6 HOURS PRN
Status: CANCELLED | OUTPATIENT
Start: 2023-09-06

## 2023-09-06 RX ORDER — ASPIRIN 81 MG/1
81 TABLET ORAL DAILY
Status: CANCELLED | OUTPATIENT
Start: 2023-09-06

## 2023-09-06 RX ORDER — ASPIRIN 81 MG/1
81 TABLET ORAL DAILY
COMMUNITY

## 2023-09-06 RX ORDER — NITROGLYCERIN 0.4 MG/1
0.4 TABLET SUBLINGUAL
Status: CANCELLED | OUTPATIENT
Start: 2023-09-06

## 2023-09-06 RX ORDER — AMOXICILLIN 250 MG
2 CAPSULE ORAL 2 TIMES DAILY
Status: DISCONTINUED | OUTPATIENT
Start: 2023-09-06 | End: 2023-09-07 | Stop reason: HOSPADM

## 2023-09-06 RX ORDER — ALBUTEROL SULFATE 90 UG/1
2 AEROSOL, METERED RESPIRATORY (INHALATION) EVERY 4 HOURS PRN
COMMUNITY

## 2023-09-06 RX ORDER — BUDESONIDE AND FORMOTEROL FUMARATE DIHYDRATE 160; 4.5 UG/1; UG/1
2 AEROSOL RESPIRATORY (INHALATION)
Status: DISCONTINUED | OUTPATIENT
Start: 2023-09-06 | End: 2023-09-07 | Stop reason: HOSPADM

## 2023-09-06 RX ADMIN — SODIUM CHLORIDE 1000 ML: 9 INJECTION, SOLUTION INTRAVENOUS at 09:09

## 2023-09-06 RX ADMIN — ATORVASTATIN CALCIUM 10 MG: 10 TABLET, FILM COATED ORAL at 20:00

## 2023-09-06 RX ADMIN — BUDESONIDE AND FORMOTEROL FUMARATE DIHYDRATE 2 PUFF: 160; 4.5 AEROSOL RESPIRATORY (INHALATION) at 20:38

## 2023-09-06 RX ADMIN — ALBUTEROL SULFATE 2.5 MG: 2.5 SOLUTION RESPIRATORY (INHALATION) at 20:41

## 2023-09-06 RX ADMIN — BUSPIRONE HYDROCHLORIDE 10 MG: 10 TABLET ORAL at 20:00

## 2023-09-06 RX ADMIN — ENOXAPARIN SODIUM 40 MG: 40 INJECTION SUBCUTANEOUS at 20:00

## 2023-09-06 RX ADMIN — Medication 10 ML: at 20:01

## 2023-09-06 RX ADMIN — BUSPIRONE HYDROCHLORIDE 10 MG: 10 TABLET ORAL at 17:51

## 2023-09-06 RX ADMIN — SODIUM CHLORIDE, POTASSIUM CHLORIDE, SODIUM LACTATE AND CALCIUM CHLORIDE 100 ML/HR: 600; 310; 30; 20 INJECTION, SOLUTION INTRAVENOUS at 17:38
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
## 2023-09-06 NOTE — TELEPHONE ENCOUNTER
CALLED MD OFFICE TO REPORT THAT PATIENT ARRIVED AT THERAPY APPT REPORTING SHE WAS HAVING CHEST PAINS.  THERAPIST ADVISED PATIENT TO GO TO THE ER.
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
## 2023-09-06 NOTE — ED NOTES
Pt orthostatic vitals completed.  Lying /47, HR 75 - c/o dizziness  Sitting /70, HR 60 -c/o dizziness  Standing /70, HR 90 -c/o dizziness.  AJNEY Trevino made aware.

## 2023-09-06 NOTE — PLAN OF CARE
Pt was admitted from the ED this shift. Pt resting in bed without complaints. No s/s of acute distress. VSS
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
## 2023-09-06 NOTE — CASE MANAGEMENT/SOCIAL WORK
Discharge Planning Assessment   [facility]iz     Patient Name: Julieta Holt  MRN: [id number]  Today's Date: 9/6/2023    Admit Date: 9/6/2023    Plan: Spoke with patient at bedside. Patient lives with boyfriend Yuri [phone], pt will return home at discharge and family will transport. Patient current DME straight cane and nebulizer from [facility] and walker and cpap from [facility]. No home health or oxygen. PCP Joan Vasquez. Pharmacy [place], [place]. No POA or Living Will.   Discharge Needs Assessment       Row Name 09/06/23 1414       Living Environment    People in Home significant other    Name(s) of People in Home Yuri [phone]    Potentially Unsafe Housing Conditions none    Primary Care Provided by self    Provides Primary Care For no one    Family Caregiver if Needed significant other    Quality of Family Relationships helpful;involved;supportive    Able to Return to Prior Arrangements yes       Resource/Environmental Concerns    Resource/Environmental Concerns none    Transportation Concerns none       Transportation Needs    In the past 12 months, has lack of transportation kept you from medical appointments or from getting medications? no    In the past 12 months, has lack of transportation kept you from meetings, work, or from getting things needed for daily living? No       Food Insecurity    Within the past 12 months, you worried that your food would run out before you got the money to buy more. Never true    Within the past 12 months, the food you bought just didn't last and you didn't have money to get more. Never true       Transition Planning    Patient/Family Anticipates Transition to home    Transportation Anticipated family or friend will provide       Discharge Needs Assessment    Readmission Within the Last 30 Days no previous admission in last 30 days    Equipment Currently Used at Home cane, straight;walker, standard;cpap;nebulizer    Concerns to be  Addressed discharge planning    Anticipated Changes Related to Illness none    Equipment Needed After Discharge none                   Discharge Plan       Row Name 09/06/23 1416       Plan    Plan Spoke with patient at bedside. Patient lives with boyfriend Yuri [phone], pt will return home at discharge and family will transport. Patient current DME straight cane and nebulizer from [facility] and walker and cpap from [facility]. No home health or oxygen. PCP Joan Vasquez. Pharmacy [place], [place] No POA or Living Will.    Patient/Family in Agreement with Plan yes                  Continued Care and Services - Admitted Since 9/6/2023    Coordination has not been started for this encounter.          Demographic Summary       Row Name 09/06/23 1414       General Information    Admission Type observation    Arrived From home    Referral Source emergency department    Reason for Consult discharge planning    Preferred Language English               Keena Roman

## 2023-09-07 ENCOUNTER — APPOINTMENT (OUTPATIENT)
Dept: CARDIOLOGY | Facility: HOSPITAL | Age: 73
End: 2023-09-07
Payer: MEDICARE

## 2023-09-07 VITALS
BODY MASS INDEX: 39.55 KG/M2 | TEMPERATURE: 97.7 F | OXYGEN SATURATION: 96 % | WEIGHT: 209.5 LBS | RESPIRATION RATE: 18 BRPM | SYSTOLIC BLOOD PRESSURE: 126 MMHG | HEART RATE: 81 BPM | HEIGHT: 61 IN | DIASTOLIC BLOOD PRESSURE: 46 MMHG

## 2023-09-07 LAB
ALBUMIN SERPL-MCNC: 3.6 G/DL (ref 3.5–5.2)
ALBUMIN/GLOB SERPL: 1.6 G/DL
ALP SERPL-CCNC: 102 U/L (ref 39–117)
ALT SERPL W P-5'-P-CCNC: 33 U/L (ref 1–33)
ANION GAP SERPL CALCULATED.3IONS-SCNC: 9.5 MMOL/L (ref 5–15)
AST SERPL-CCNC: 17 U/L (ref 1–32)
BASOPHILS # BLD AUTO: 0.04 10*3/MM3 (ref 0–0.2)
BASOPHILS NFR BLD AUTO: 0.5 % (ref 0–1.5)
BH CV ECHO MEAS - ACS: 1.8 CM
BH CV ECHO MEAS - AO MAX PG: 13.8 MMHG
BH CV ECHO MEAS - AO MEAN PG: 8 MMHG
BH CV ECHO MEAS - AO ROOT DIAM: 3.4 CM
BH CV ECHO MEAS - AO V2 MAX: 186 CM/SEC
BH CV ECHO MEAS - AO V2 VTI: 34.6 CM
BH CV ECHO MEAS - AVA(I,D): 2.12 CM2
BH CV ECHO MEAS - EDV(CUBED): 109.6 ML
BH CV ECHO MEAS - EDV(MOD-SP4): 72.9 ML
BH CV ECHO MEAS - EF(MOD-SP4): 59.1 %
BH CV ECHO MEAS - ESV(CUBED): 29.1 ML
BH CV ECHO MEAS - ESV(MOD-SP4): 29.8 ML
BH CV ECHO MEAS - FS: 35.7 %
BH CV ECHO MEAS - IVS/LVPW: 1.02 CM
BH CV ECHO MEAS - IVSD: 1.03 CM
BH CV ECHO MEAS - LA DIMENSION: 2.4 CM
BH CV ECHO MEAS - LAT PEAK E' VEL: 5.7 CM/SEC
BH CV ECHO MEAS - LV DIASTOLIC VOL/BSA (35-75): 37.9 CM2
BH CV ECHO MEAS - LV MASS(C)D: 173.4 GRAMS
BH CV ECHO MEAS - LV MAX PG: 6.2 MMHG
BH CV ECHO MEAS - LV MEAN PG: 3 MMHG
BH CV ECHO MEAS - LV SYSTOLIC VOL/BSA (12-30): 15.5 CM2
BH CV ECHO MEAS - LV V1 MAX: 124 CM/SEC
BH CV ECHO MEAS - LV V1 VTI: 23.3 CM
BH CV ECHO MEAS - LVIDD: 4.8 CM
BH CV ECHO MEAS - LVIDS: 3.1 CM
BH CV ECHO MEAS - LVOT AREA: 3.1 CM2
BH CV ECHO MEAS - LVOT DIAM: 2 CM
BH CV ECHO MEAS - LVPWD: 1.01 CM
BH CV ECHO MEAS - MED PEAK E' VEL: 8.2 CM/SEC
BH CV ECHO MEAS - MV A MAX VEL: 153 CM/SEC
BH CV ECHO MEAS - MV E MAX VEL: 106 CM/SEC
BH CV ECHO MEAS - MV E/A: 0.69
BH CV ECHO MEAS - PA ACC TIME: 0.06 SEC
BH CV ECHO MEAS - RAP SYSTOLE: 10 MMHG
BH CV ECHO MEAS - RVSP: 35.4 MMHG
BH CV ECHO MEAS - SI(MOD-SP4): 22.4 ML/M2
BH CV ECHO MEAS - SV(LVOT): 73.2 ML
BH CV ECHO MEAS - SV(MOD-SP4): 43.1 ML
BH CV ECHO MEAS - TR MAX PG: 25.4 MMHG
BH CV ECHO MEAS - TR MAX VEL: 252 CM/SEC
BH CV ECHO MEASUREMENTS AVERAGE E/E' RATIO: 15.25
BILIRUB SERPL-MCNC: 0.3 MG/DL (ref 0–1.2)
BUN SERPL-MCNC: 17 MG/DL (ref 8–23)
BUN/CREAT SERPL: 19.8 (ref 7–25)
CALCIUM SPEC-SCNC: 9.1 MG/DL (ref 8.6–10.5)
CHLORIDE SERPL-SCNC: 107 MMOL/L (ref 98–107)
CO2 SERPL-SCNC: 26.5 MMOL/L (ref 22–29)
CREAT SERPL-MCNC: 0.86 MG/DL (ref 0.57–1)
DEPRECATED RDW RBC AUTO: 47.6 FL (ref 37–54)
EGFRCR SERPLBLD CKD-EPI 2021: 71.4 ML/MIN/1.73
EOSINOPHIL # BLD AUTO: 0.26 10*3/MM3 (ref 0–0.4)
EOSINOPHIL NFR BLD AUTO: 3 % (ref 0.3–6.2)
ERYTHROCYTE [DISTWIDTH] IN BLOOD BY AUTOMATED COUNT: 13.9 % (ref 12.3–15.4)
GLOBULIN UR ELPH-MCNC: 2.2 GM/DL
GLUCOSE SERPL-MCNC: 105 MG/DL (ref 65–99)
HCT VFR BLD AUTO: 36.4 % (ref 34–46.6)
HGB BLD-MCNC: 10.9 G/DL (ref 12–15.9)
IMM GRANULOCYTES # BLD AUTO: 0.06 10*3/MM3 (ref 0–0.05)
IMM GRANULOCYTES NFR BLD AUTO: 0.7 % (ref 0–0.5)
LEFT ATRIUM VOLUME INDEX: 18.4 ML/M2
LYMPHOCYTES # BLD AUTO: 2.31 10*3/MM3 (ref 0.7–3.1)
LYMPHOCYTES NFR BLD AUTO: 26.3 % (ref 19.6–45.3)
MAGNESIUM SERPL-MCNC: 1.9 MG/DL (ref 1.6–2.4)
MCH RBC QN AUTO: 27.9 PG (ref 26.6–33)
MCHC RBC AUTO-ENTMCNC: 29.9 G/DL (ref 31.5–35.7)
MCV RBC AUTO: 93.1 FL (ref 79–97)
MONOCYTES # BLD AUTO: 0.65 10*3/MM3 (ref 0.1–0.9)
MONOCYTES NFR BLD AUTO: 7.4 % (ref 5–12)
NEUTROPHILS NFR BLD AUTO: 5.46 10*3/MM3 (ref 1.7–7)
NEUTROPHILS NFR BLD AUTO: 62.1 % (ref 42.7–76)
NRBC BLD AUTO-RTO: 0 /100 WBC (ref 0–0.2)
PLATELET # BLD AUTO: 218 10*3/MM3 (ref 140–450)
PMV BLD AUTO: 11 FL (ref 6–12)
POTASSIUM SERPL-SCNC: 4.4 MMOL/L (ref 3.5–5.2)
PROT SERPL-MCNC: 5.8 G/DL (ref 6–8.5)
QT INTERVAL: 466 MS
QT INTERVAL: 472 MS
QT INTERVAL: 482 MS
QTC INTERVAL: 421 MS
QTC INTERVAL: 472 MS
QTC INTERVAL: 486 MS
RBC # BLD AUTO: 3.91 10*6/MM3 (ref 3.77–5.28)
SODIUM SERPL-SCNC: 143 MMOL/L (ref 136–145)
WBC NRBC COR # BLD: 8.78 10*3/MM3 (ref 3.4–10.8)

## 2023-09-07 PROCEDURE — G0378 HOSPITAL OBSERVATION PER HR: HCPCS

## 2023-09-07 PROCEDURE — 83735 ASSAY OF MAGNESIUM: CPT | Performed by: PHYSICIAN ASSISTANT

## 2023-09-07 PROCEDURE — 94799 UNLISTED PULMONARY SVC/PX: CPT

## 2023-09-07 PROCEDURE — 94664 DEMO&/EVAL PT USE INHALER: CPT

## 2023-09-07 PROCEDURE — 99214 OFFICE O/P EST MOD 30 MIN: CPT | Performed by: INTERNAL MEDICINE

## 2023-09-07 PROCEDURE — 93306 TTE W/DOPPLER COMPLETE: CPT

## 2023-09-07 PROCEDURE — 85025 COMPLETE CBC W/AUTO DIFF WBC: CPT | Performed by: PHYSICIAN ASSISTANT

## 2023-09-07 PROCEDURE — 93306 TTE W/DOPPLER COMPLETE: CPT | Performed by: SPECIALIST

## 2023-09-07 PROCEDURE — 80053 COMPREHEN METABOLIC PANEL: CPT | Performed by: PHYSICIAN ASSISTANT

## 2023-09-07 RX ORDER — BUSPIRONE HYDROCHLORIDE 10 MG/1
20 TABLET ORAL EVERY EVENING
COMMUNITY

## 2023-09-07 RX ADMIN — BUDESONIDE AND FORMOTEROL FUMARATE DIHYDRATE 2 PUFF: 160; 4.5 AEROSOL RESPIRATORY (INHALATION) at 06:23

## 2023-09-07 RX ADMIN — Medication 10 ML: at 11:02

## 2023-09-07 RX ADMIN — TIOTROPIUM BROMIDE INHALATION SPRAY 2 PUFF: 3.12 SPRAY, METERED RESPIRATORY (INHALATION) at 06:23

## 2023-09-07 RX ADMIN — BUSPIRONE HYDROCHLORIDE 10 MG: 10 TABLET ORAL at 17:20

## 2023-09-07 RX ADMIN — BUSPIRONE HYDROCHLORIDE 10 MG: 10 TABLET ORAL at 11:01

## 2023-09-07 RX ADMIN — ASPIRIN 81 MG: 81 TABLET, COATED ORAL at 11:01

## 2023-09-07 NOTE — PLAN OF CARE
Goal Outcome Evaluation:  Plan of Care Reviewed With: patient   Pt resting in bed, no c/o CP or acute distress ntd this shift. ECHO completed this shift, results pending.  Will continue to follow plan of care.     Progress: improving

## 2023-09-07 NOTE — PLAN OF CARE
Goal Outcome Evaluation:         VSS. Patient ambulated to bathroom several times. NPO since midnight. No current complaints.

## 2023-09-07 NOTE — PROGRESS NOTES
[facility]  PROGRESS NOTE      Patient information:  Name: Julieta Holt  Age/Sex: 72 y.o. female  :  1950        PCP: Joan Vasquez, MARY  Attending: Jono Gipson DO  MRN:  [id number]  Visit Number:  [id number]    LOS:  LOS: 0 days   CODE STATUS:    Code Status and Medical Interventions:   Ordered at: 23 1349     Code Status (Patient has no pulse and is not breathing):    CPR (Attempt to Resuscitate)     Medical Interventions (Patient has pulse or is breathing):    Full Support       PROBLEM LIST:Principal Problem:    Bradycardia      Reason for Cardiology follow-up: Symptomatic bradycardia and pauses     Subjective   ADMISSION INFORMATION:  Chief Complaint   Patient presents with    Dizziness       HPI:  Julieta Holt is a 72 y.o. female with past medical history significant for congenital heart murmur, dyslipidemia, hypertension, palpitations, COPD, obesity and former smoking.     She presented to the ER this morning because she awoke and felt weak and dizzy.  At home her blood pressure was reported as 90/40 where it typically runs in the 130s over 90s.  She felt lightheaded and near syncopal.  She has felt generally unwell for the last week.  She does have associated shortness of air.     Admission EKG showed sinus arrhythmia, left bundle branch block.  It is reported that while she was in the emergency room her heart rate would drop into the 40s for a few seconds at a time and then return to the 60s and 70s.  High-sensitivity troponins were 13 and then 11.     Patient reports that she has felt near syncopal and dizzy intermittently over the last 2 years.  She sought care for this at its onset and her beta-blocker was discontinued.  She has not taken any beta-blockers since that time.    Interval History:   Patient is in room 316 B and was examined by Dr. Abbasi.  Patient sitting up on side of bed.  No acute  distress noted.  Patient reports that she is doing well.  She states that she is still experiencing occasional episodes of dizziness with position change.  She denies any syncope, chest pain or shortness of breath.    Vital Signs  Temp:  [97.8 °F (36.6 °C)-98.4 °F (36.9 °C)] 97.8 °F (36.6 °C)  Heart Rate:  [55-89] 85  Resp:  [18-20] 18  BP: (111-163)/(43-99) 111/56  Vital Signs (last 72 hrs)         09/04 0700  09/05 0659 09/05 0700  09/06 0659 09/06 0700  09/07 0659 09/07 0700  09/07 1119   Most Recent      Temp (°F)     97.8 -  98.6      97.8     97.8 (36.6) 09/07 1103    Heart Rate     55 -  90    77 -  85     85 09/07 1103    Resp     18 -  20      18     18 09/07 1103    BP     116/43 -  170/83    111/56 -  129/66     111/56 09/07 1103    SpO2 (%)     94 -  100    96 -  99     99 09/07 1103          Body mass index is 39.58 kg/m².    Intake/Output Summary (Last 24 hours) at 9/7/2023 1119  Last data filed at 9/7/2023 1100  Gross per 24 hour   Intake 490 ml   Output --   Net 490 ml       Objective     Physical Exam:      General Appearance:    Alert, cooperative, in no acute distress.   Head:    Normocephalic, without obvious abnormality, atraumatic.   Eyes:                          Conjunctivae and sclerae normal, no icterus, no pallor, corneas clear.   Neck:   No adenopathy, supple, trachea midline, no thyromegaly, no carotid bruit, no JVD.   Lungs:     Clear to auscultation, respirations regular, even and             unlabored.    Heart:    Regular rhythm and normal rate, normal S1 and S2, no          murmur, no gallop, no rub, no click   Chest Wall:    No abnormalities observed.   Abdomen:     Normal bowel sounds, no masses, no organomegaly, soft nontender, nondistended, no guarding, no rebound tenderness.   Extremities:   Moves all extremities well, no edema, no cyanosis, no            redness.   Pulses:   Pulses palpable and equal bilaterally.   Skin:   No bleeding, bruising or rash.   Neurologic:   Alert  and Oriented x 3, Speech Clear & comprehensive.       Results review   Results Review:   Results from last 7 days   Lab Units 09/07/23  0206 09/06/23  1006   WBC 10*3/mm3 8.78 8.70   HEMOGLOBIN g/dL 10.9* 11.3*   PLATELETS 10*3/mm3 218 212     Results from last 7 days   Lab Units 09/07/23  0206 09/06/23  0905   SODIUM mmol/L 143 141   POTASSIUM mmol/L 4.4 3.8   CHLORIDE mmol/L 107 103   CO2 mmol/L 26.5 25.1   BUN mg/dL 17 16   CREATININE mg/dL 0.86 1.02*   CALCIUM mg/dL 9.1 10.3   GLUCOSE mg/dL 105* 121*   ALT (SGPT) U/L 33 48*   AST (SGOT) U/L 17 22     Results from last 7 days   Lab Units 09/06/23  1149 09/06/23  0905   HSTROP T ng/L 11* 13*     Lab Results   Component Value Date    PROBNP 202.3 09/06/2023    PROBNP 956.4 (H) 10/05/2021    PROBNP 264.6 09/15/2021     No results found.     Lab Results   Component Value Date    INR 0.94 09/06/2023    INR 0.99 05/20/2022    INR 0.93 10/05/2021    INR 0.96 11/17/2017     Lab Results   Component Value Date    MG 1.9 09/07/2023    MG 2.1 09/06/2023    MG 2.0 09/15/2021     Lab Results   Component Value Date    TSH 0.959 09/06/2023      No results found for: CHOL, TRIG, HDL, LDL  Pain Management Panel           No data to display              Microbiology Results (last 10 days)       Procedure Component Value - Date/Time    Blood Culture - Blood, Arm, Left [[id number]]  (Normal) Collected: 09/06/23 0915    Lab Status: Preliminary result Specimen: Blood from Arm, Left Updated: 09/07/23 1015     Blood Culture No growth at 24 hours    Blood Culture - Blood, Arm, Right [[id number]]  (Normal) Collected: 09/06/23 0905    Lab Status: Preliminary result Specimen: Blood from Arm, Right Updated: 09/07/23 1015     Blood Culture No growth at 24 hours           Imaging Results (Last 24 Hours)       ** No results found for the last 24 hours. **            ECHO:  Results for orders placed during the hospital encounter of 03/16/23    Adult Transthoracic Echo Complete W/ Cont if Necessary  Per Protocol    Interpretation Summary    Normal left ventricular cavity size and wall thickness noted. All left ventricular wall segments contract normally.    Left ventricular ejection fraction appears to be 61 - 65%.    Left ventricular diastolic function is consistent with (grade I) impaired relaxation.    The aortic valve is not well visualized. The aortic valve is grossly normal in structure. Trace aortic valve regurgitation is present. No aortic valve stenosis is present.    There is calcification of the mitral valve posterior leaflet(s). Trace to mild mitral valve regurgitation is present. No significant mitral valve stenosis is present.    There is no evidence of pericardial effusion.      STRESS TEST:  Results for orders placed during the hospital encounter of 06/10/22    Stress Test With Myocardial Perfusion (1 Day)    Interpretation Summary  · Myocardial perfusion imaging indicates a normal myocardial perfusion study with no evidence of ischemia.  · Left ventricular ejection fraction is hyperdynamic (Calculated EF > 70%).  · Impressions are consistent with a low risk study.       HEART CATH:  No results found for this or any previous visit.      EK/6/23 at 1630      TELEMETRY:     Sinus Rhythm 80's          I reviewed the patient's new clinical results.    Medication Review:   Current list of medications may not reflect those currently placed in orders that are not signed or are being held.     aspirin, 81 mg, Oral, Daily  atorvastatin, 10 mg, Oral, Nightly  budesonide-formoterol, 2 puff, Inhalation, BID - RT   And  tiotropium bromide monohydrate, 2 puff, Inhalation, Daily - RT  busPIRone, 10 mg, Oral, TID  enoxaparin, 40 mg, Subcutaneous, Nightly  senna-docusate sodium, 2 tablet, Oral, BID  sodium chloride, 10 mL, Intravenous, Q12H      lactated ringers, 100 mL/hr, Last Rate: 100 mL/hr (23 0314)        acetaminophen    albuterol    senna-docusate sodium **AND** polyethylene glycol **AND**  bisacodyl **AND** bisacodyl    [COMPLETED] Insert Peripheral IV **AND** sodium chloride    sodium chloride    sodium chloride    Assessment      Episode of dizziness and near syncope, possibly due to hypotension from antihypertensive medications and possibly compounded by some bradycardia.  Sinus bradycardia in the ED in the 40s, is improved with heart rate currently in the 60s and 70s.  History of systemic hypertension.  Blood pressure has been running low prior to admission on lisinopril 10 mg p.o. twice daily.  Marginally elevated troponin with delta of 2 which is of questionable significance.  Patient denies any anginal symptoms.  She had a normal stress sestamibi study on 6/10/2022.    Plan     Since patient symptoms  seem to be related to hypotension, will have to adjust the antihypertensive medications with decreasing the dose of lisinopril as needed to keep her systolic blood pressure more than 110.  Since her heart rate seems to be doing good at this time, I do not see any absolute indication for permanent pacemaker implantation at this time.  Patient remained stable with ambulation, she could be discharged home on outpatient 2-week Holter monitor and follow-up in our office in 2 to 3 weeks.  If she does have evidence of significant symptomatic bradycardia then we will certainly consider permanent pacemaker implantation.  I have discussed this plan with Mrs. Holt and Dr. Gipson and they are agreeable.    I have discussed the patients findings and my recommendations with patient.    Electronically signed by MARY Vieira, 09/07/23, 11:27 AM EDT.     Electronically signed by Bashir Abbasi MD, 09/07/23, 3:20 PM EDT.        Please note that portions of this note were completed with a voice recognition program.    Please note that portions of this note were copied and has been reviewed and is accurate as of 9/7/2023 .

## 2023-09-07 NOTE — DISCHARGE SUMMARY
[facility] Medicine Services  DISCHARGE SUMMARY    Patient Identification:  Name:  Julieta Holt  Age:  72 y.o.  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 Abbasi     Procedures Performed:  2023: Transthoracic echocardiogram     Left ventricular systolic function is normal. Left ventricular ejection fraction appears to be 61 - 65%.    Left ventricular diastolic function is consistent with (grade Ia w/high LAP) impaired relaxation.    Estimated right ventricular systolic pressure from tricuspid regurgitation is mildly elevated (35-45 mmHg).    History of Presenting Illness     Julieta Holt is a 72 y.o. female with past medical history significant for essential hypertension, hyperlipidemia, HFrEF with recovered EF, COPD, obstructive sleep apnea, seizure disorder, anxiety/depression, former tobacco abuse, and obesity by BMI that presented to the [facility] emergency department for evaluation of lightheadedness, near syncope, and low blood pressure. Please see the admitting history and physical for further details. Patient had also reported recent GI illness with nausea/vomiting/diarrhea, she continued to take her BP medications.      Hospital Course     Patient was admitted to the telemetry floor for further evaluation  and treatment. Patient was chest pain free, HS troponin was with negative trend and non significant delta. It was suspected that patient was hypovolumic and the combination of her blood pressure medications caused her to be orthostatic. IV fluids were administered. EKG was with sinus arrhythmia and intermittent ventricular pauses. Patient not on beta blocker outpatient, previously taken off due to low BP in the past. No evidence of heart block, patient was monitored closely on telemetry.      On day of discharge, it was felt that she had reached maximal inpatient benefit and was stable for discharge home. Patient was feeling better, no further light headedness or dizziness. TTE resulted without acute abnormality. Blood pressure remained stable. Per attending, patient cleared for discharge home. Patient to continue holding her home Lasix and Lisinopril at this time. Will have patient follow up with PCP in 1 week and Cardiology in 2 weeks. Patient may benefit from outpatient Holter monitor, to be arranged per cardiology. Please see discharge MAR below for complete list of discharge medications. Patient educated on signs/symptoms warranting emergent return to care, verbalized understanding.     Discharge Vitals/Physical Examination     Vital Signs:  Temp:  [97.8 °F (36.6 °C)-98.4 °F (36.9 °C)] 97.8 °F (36.6 °C)  Heart Rate:  [55-89] 85  Resp:  [18-20] 18  BP: (111-163)/(43-72) 111/56  Mean Arterial Pressure (Non-Invasive) for the past 24 hrs (Last 3 readings):   Noninvasive MAP (mmHg)   09/07/23 1103 88   09/07/23 0716 98   09/07/23 0344 87     SpO2 Percentage    09/07/23 0623 09/07/23 0716 09/07/23 1103   SpO2: 96% 96% 99%     SpO2:  [94 %-99 %] 99 %  on   ;   Device (Oxygen Therapy): room air    Body mass index is 39.58 kg/m².  Wt Readings from Last 3 Encounters:   09/07/23 95 kg (209 lb 8 oz)   07/07/23 90.9 kg (200 lb 6.4 oz)   07/06/23 90.9 kg (200 lb 6.4 oz)       Physical Exam:  Physical Exam  Nursing note  reviewed.   Constitutional:       General: She is awake. She is not in acute distress.     Appearance: She is well-developed. She is obese. She is not toxic-appearing.   HENT:      Head: Normocephalic and atraumatic.      Mouth/Throat:      Mouth: Mucous membranes are moist.   Eyes:      Conjunctiva/sclera: Conjunctivae normal.      Pupils: Pupils are equal, round, and reactive to light.   Cardiovascular:      Rate and Rhythm: Normal rate and regular rhythm.      Pulses:           Dorsalis pedis pulses are 2+ on the right side and 2+ on the left side.      Heart sounds: Normal heart sounds. No murmur heard.    No friction rub. No gallop.   Pulmonary:      Effort: Pulmonary effort is normal.      Breath sounds: Normal breath sounds and air entry. No wheezing, rhonchi or rales.   Abdominal:      General: Bowel sounds are normal. There is no distension.      Palpations: Abdomen is soft.      Tenderness: There is no abdominal tenderness.   Musculoskeletal:      Cervical back: Neck supple.      Right lower leg: No edema.      Left lower leg: No edema.   Skin:     General: Skin is warm and dry.      Capillary Refill: Capillary refill takes less than 2 seconds.   Neurological:      General: No focal deficit present.      Mental Status: She is alert and oriented to person, place, and time.      Sensory: Sensation is intact.      Motor: Motor function is intact.      Comments: Awake and alert. Follows commands. Answers questions appropriately. Moves all extremities equally. Strength and sensation intact. No focal neuro deficit on exam.   Psychiatric:         Mood and Affect: Mood and affect normal.         Speech: Speech normal.         Behavior: Behavior is cooperative.     Pertinent Laboratory/Radiology Results     Pertinent Laboratory Results:  Results from last 7 days   Lab Units 09/06/23  1149 09/06/23  0905   HSTROP T ng/L 11* 13*     Results from last 7 days   Lab Units 09/06/23  0905   PROBNP pg/mL 202.3          Results from last 7 days   Lab Units 09/07/23  0206 09/06/23  1521 09/06/23  1149 09/06/23  1006 09/06/23  0905   CRP mg/dL  --   --   --   --  <0.30   LACTATE mmol/L  --  1.8 4.1*  --  2.2*   WBC 10*3/mm3 8.78  --   --  8.70  --    HEMOGLOBIN g/dL 10.9*  --   --  11.3*  --    HEMATOCRIT % 36.4  --   --  37.9  --    MCV fL 93.1  --   --  94.0  --    MCHC g/dL 29.9*  --   --  29.8*  --    PLATELETS 10*3/mm3 218  --   --  212  --    INR   --   --   --   --  0.94     Results from last 7 days   Lab Units 09/07/23  0206 09/06/23  0905   SODIUM mmol/L 143 141   POTASSIUM mmol/L 4.4 3.8   MAGNESIUM mg/dL 1.9 2.1   CHLORIDE mmol/L 107 103   CO2 mmol/L 26.5 25.1   BUN mg/dL 17 16   CREATININE mg/dL 0.86 1.02*   CALCIUM mg/dL 9.1 10.3   GLUCOSE mg/dL 105* 121*   ALBUMIN g/dL 3.6 4.3   BILIRUBIN mg/dL 0.3 0.4   ALK PHOS U/L 102 131*   AST (SGOT) U/L 17 22   ALT (SGPT) U/L 33 48*   Estimated Creatinine Clearance: 61.3 mL/min (by C-G formula based on SCr of 0.86 mg/dL).    Hemoglobin A1C   Date/Time Value Ref Range Status   09/06/2023 1006 5.70 (H) 4.80 - 5.60 % Final     Glucose   Date/Time Value Ref Range Status   09/06/2023 0856 125 70 - 130 mg/dL Final     Lab Results   Component Value Date    TSH 0.959 09/06/2023    FREET4 1.85 (H) 09/06/2023     Microbiology Results (last 10 days)       Procedure Component Value - Date/Time    Blood Culture - Blood, Arm, Left [[id number]]  (Normal) Collected: 09/06/23 0915    Lab Status: Preliminary result Specimen: Blood from Arm, Left Updated: 09/07/23 1015     Blood Culture No growth at 24 hours    Blood Culture - Blood, Arm, Right [[id number]]  (Normal) Collected: 09/06/23 0905    Lab Status: Preliminary result Specimen: Blood from Arm, Right Updated: 09/07/23 1015     Blood Culture No growth at 24 hours          Pertinent Radiology Results:  Imaging Results (All)       Procedure Component Value Units Date/Time    XR Chest 1 View [[id number]] Collected: 09/06/23 0942     Updated:  09/06/23 0944    Narrative:      FINDINGS:    LUNGS AND PLEURAL SPACES:  Unremarkable.  No consolidation.  No  pneumothorax.    HEART:  Unremarkable.  No cardiomegaly.    MEDIASTINUM:  Unremarkable.    BONES/JOINTS:  Unremarkable.    Impression:        Unremarkable exam. No acute cardiopulmonary findings identified.     This report was finalized on 9/6/2023 9:42 AM by Dr. Ever Ku MD.     Discharge Disposition/Discharge Medications/Discharge Appointments     Discharge Disposition:   Home or Self Care    Condition at Discharge:  Stable     DME Prescribed at Discharge:  None     Discharge Diet:  Diet Instructions       Diet: Cardiac Diets; Healthy Heart (2-3 Na+); Regular Texture (IDDSI 7); Thin (IDDSI 0)      Discharge Diet: Cardiac Diets    Cardiac Diet: Healthy Heart (2-3 Na+)    Texture: Regular Texture (IDDSI 7)    Fluid Consistency: Thin (IDDSI 0)          Discharge Activity:  Activity Instructions       Activity as Tolerated            Code Status While Inpatient:  Code Status and Medical Interventions:   Ordered at: 09/06/23 1348     Code Status (Patient has no pulse and is not breathing):    CPR (Attempt to Resuscitate)     Medical Interventions (Patient has pulse or is breathing):    Full Support     Discharge Medications:     Discharge Medications        Continue These Medications        Instructions Start Date   albuterol sulfate  (90 Base) MCG/ACT inhaler  Commonly known as: PROVENTIL HFA;VENTOLIN HFA;PROAIR HFA   2 puffs, Inhalation, Every 4 Hours PRN      aspirin 81 MG EC tablet   81 mg, Oral, Daily      atorvastatin 10 MG tablet  Commonly known as: LIPITOR   10 mg, Oral, Nightly      Biotin 1 MG capsule   1 tablet, Oral, Daily      busPIRone 10 MG tablet  Commonly known as: BUSPAR   10 mg, Oral, Every Morning, Take one tablet by mouth every morning and two tablets in the evening.      busPIRone 10 MG tablet  Commonly known as: BUSPAR   20 mg, Oral, Every Evening, Take one tablet by mouth  every morning and two tablets in the evening.      CALCIUM CARBONATE PO   Oral, Daily      nitroglycerin 0.4 MG SL tablet  Commonly known as: NITROSTAT   0.4 mg, Sublingual, Every 5 Minutes PRN, Take no more than 3 doses in 15 minutes.      Potassium 99 MG tablet   Oral, Daily      Prolia 60 MG/ML solution prefilled syringe syringe  Generic drug: denosumab   60 mg, Subcutaneous, Every 6 Months      Trelegy Ellipta 100-62.5-25 MCG/ACT inhaler  Generic drug: Fluticasone-Umeclidin-Vilant   1 puff, Inhalation, Daily - RT      vitamin D3 125 MCG (5000 UT) capsule capsule   5,000 Units, Oral, Daily             Stop These Medications      furosemide 20 MG tablet  Commonly known as: LASIX     lisinopril 10 MG tablet  Commonly known as: PRINIVILZESTRIL            Discharge Appointments:  Additional Instructions for the Follow-ups that You Need to Schedule       Call MD With Problems / Concerns   As directed      GENERAL WARNINGS: Return to ED or contact your primary care provider immediately if   your condition worsens or changes unexpectedly, if not improving as expected,   or if other problems arise.    Order Comments: GENERAL WARNINGS: Return to ED or contact your primary care provider immediately if your condition worsens or changes unexpectedly, if not improving as expected, or if other problems arise.         Discharge Follow-up with PCP   As directed       Currently Documented PCP:    Joan Vasquez, APRN    PCP Phone Number:    [phone]     Follow Up Details: 1 week        Discharge Follow-up with Specialty: Cardiology; 2 Weeks   As directed      Specialty: Cardiology   Follow Up: 2 Weeks              Test Results Pending at Discharge:  Pending Labs       Order Current Status    Blood Culture - Blood, Arm, Left Preliminary result    Blood Culture - Blood, Arm, Right Preliminary result           The 10-year ASCVD risk score (Ricky JIN, et al., 2019) is: 17.4%    Values used to calculate the score:      Age: 72  years      Sex: Female      Is Non- : No      Diabetic: Yes      Tobacco smoker: No      Systolic Blood Pressure: 111 mmHg      Is BP treated: No      HDL Cholesterol: 50 mg/dL      Total Cholesterol: 134 mg/dL     Attestation: I personally discussed the patient's hospital course, disposition, discharge planning, and discharge medications with attending physician, Jono Andino DO, prior to time of discharge.     Kayla Delarosa PA-C  Hospitalist Service -- [facility]       09/07/23  16:13 EDT    Discharge Time: Greater than 30 minutes

## 2023-09-08 NOTE — DISCHARGE INSTR - APPOINTMENTS
Pt  has  an  apointment  with  anita luque  for  sept  15   at  11 :20  and  jose  for  sept 25  at  1:15 called  pt  SAYS  PT  PHONE  DISCONNECTED  WILL MAIL  APOINTMENTS    TO  PT

## 2023-09-11 LAB
BACTERIA SPEC AEROBE CULT: NORMAL
BACTERIA SPEC AEROBE CULT: NORMAL

## 2023-09-22 ENCOUNTER — OFFICE VISIT (OUTPATIENT)
Dept: CARDIOLOGY | Facility: HOSPITAL | Age: 73
End: 2023-09-22
Payer: MEDICARE

## 2023-09-22 ENCOUNTER — HOSPITAL ENCOUNTER (OUTPATIENT)
Dept: CARDIOLOGY | Facility: HOSPITAL | Age: 73
Discharge: HOME | End: 2023-09-22
Payer: MEDICARE

## 2023-09-22 VITALS
DIASTOLIC BLOOD PRESSURE: 66 MMHG | HEIGHT: 61 IN | TEMPERATURE: 98.5 F | OXYGEN SATURATION: 94 % | RESPIRATION RATE: 18 BRPM | WEIGHT: 205.5 LBS | SYSTOLIC BLOOD PRESSURE: 141 MMHG | HEART RATE: 115 BPM | BODY MASS INDEX: 38.8 KG/M2

## 2023-09-22 DIAGNOSIS — R06.09 DOE (DYSPNEA ON EXERTION): ICD-10-CM

## 2023-09-22 DIAGNOSIS — J44.9 CHRONIC OBSTRUCTIVE PULMONARY DISEASE, UNSPECIFIED COPD TYPE: ICD-10-CM

## 2023-09-22 DIAGNOSIS — R00.0 TACHYCARDIA: ICD-10-CM

## 2023-09-22 DIAGNOSIS — R07.2 PRECORDIAL PAIN: Primary | ICD-10-CM

## 2023-09-22 DIAGNOSIS — R53.83 OTHER FATIGUE: ICD-10-CM

## 2023-09-22 DIAGNOSIS — E78.5 DYSLIPIDEMIA: ICD-10-CM

## 2023-09-22 DIAGNOSIS — I10 PRIMARY HYPERTENSION: ICD-10-CM

## 2023-09-22 DIAGNOSIS — R07.2 PRECORDIAL PAIN: ICD-10-CM

## 2023-09-22 PROCEDURE — 93005 ELECTROCARDIOGRAM TRACING: CPT | Performed by: NURSE PRACTITIONER

## 2023-09-22 RX ORDER — LISINOPRIL 10 MG/1
10 TABLET ORAL DAILY
COMMUNITY

## 2023-09-22 NOTE — PROGRESS NOTES
[facility] Heart Monitor Documentation    Julieta Holt  1950  [id number]  09/22/23      [] ZIO XT Patch  Model [id number] Prescribed for N/A Days    Serial Number: (N + 9 Digits) N   Apply-By Date on Box:   USPS Tracking Number:   USPS Tracking        [] [employer] MINI PLUS Mobile Cardiac Telemetry  Model BGMINIPLUS Prescribed for 30 Days    Serial Number: (BGM + 7 Digits) [id number]  Shipped-By Date on Box: 166103  UPS Tracking Number: [id number]  UPS Tracking      [] [employer] MINI Holter Monitor  Model BGMINIEL Prescribed for N/A Days    Serial Number: (7 Digits)   Shipped-By Date on Box:   UPS Tracking Number: [id number]  UPS Tracking        This monitor was applied to the patient's chest and checked for proper functioning.  Ms. Julieta Holt was instructed in the proper use of this monitor.  She was given the opportunity to ask questions and left the office with the device 's instruction manual.    Patsy Qureshi MA, 11:18 EDT, 09/22/23                  [facility]

## 2023-09-22 NOTE — PROGRESS NOTES
"Chief Complaint  Follow-up, Shortness of Breath, and Chest Pain    Subjective    History of Present Illness {CC  Problem List  Visit  Diagnosis   Encounters  Notes  Medications  Labs  Result Review Imaging  Media :23}       History of Present Illness   72-year-old female presents the office today for ongoing evaluation of her near syncope, orthostatic hypotension and symptomatic bradycardia.  She was admitted to [facility] 9/6/2023 to 9/7/2023 with complaints of near syncope.  She has a past medical history of hypertension, hyperlipidemia, HFrEF with recovered EF, COPD, obstructive sleep apnea, seizure disorder, anxiety, depression, former tobacco abuse and obesity.  Blood pressure was low upon arrival to the hospital.  Patient had recent GI illness with nausea vomiting diarrhea and continue to take her blood pressure medicines.  She did receive IV fluids.  TTE performed 9/7/2023 showed an EF of 61 to 65% with grade 1 diastolic dysfunction, RVSP 35 to 45 mmHg.  Lisinopril was decreased from twice daily to once daily and Lasix was discontinued.  She presents today with home blood pressure log showing blood pressure readings 105/66 to 141/66.  She is checking her blood pressures at home with a wrist cuff and that has been correlated to the office blood pressure today.  Reports that since she has been home from the hospital she has been experiencing a tightness in her chest that is present at rest and with exertion.  Also reports associated dyspnea as well as fatigue.  Reports intermittent dizziness when blood pressures are 100 systolic.  She often reports palpitations and racing heart worse over the last few days.  Objective     Vital Signs:   Vitals:    09/22/23 1042   BP: 141/66   BP Location: Left arm   Patient Position: Sitting   Cuff Size: Adult   Pulse: 115   Resp: 18   Temp: 98.5 °F (36.9 °C)   TempSrc: Temporal   SpO2: 94%   Weight: 93.2 kg (205 lb 8 oz)   Height: 155.6 cm (61.25\") "     Body mass index is 38.51 kg/m².  Physical Exam  Vitals and nursing note reviewed.   Constitutional:       Appearance: Normal appearance.   HENT:      Head: Normocephalic.   Eyes:      Pupils: Pupils are equal, round, and reactive to light.   Cardiovascular:      Rate and Rhythm: Normal rate and regular rhythm.      Pulses: Normal pulses.      Heart sounds: Normal heart sounds. No murmur heard.  Pulmonary:      Effort: Pulmonary effort is normal.      Breath sounds: Normal breath sounds.   Abdominal:      General: Bowel sounds are normal.      Palpations: Abdomen is soft.   Musculoskeletal:         General: Normal range of motion.      Cervical back: Normal range of motion.      Right lower leg: No edema.      Left lower leg: No edema.   Skin:     General: Skin is warm and dry.      Capillary Refill: Capillary refill takes less than 2 seconds.   Neurological:      Mental Status: She is alert and oriented to person, place, and time.   Psychiatric:         Mood and Affect: Mood normal.         Thought Content: Thought content normal.            Result Review  Data Reviewed:{ Labs  Result Review  Imaging  Med Tab  Media :23}   Vent. Rate :  92 BPM     Atrial Rate :  92 BPM     P-R Int : 148 ms          QRS Dur : 136 ms      QT Int : 382 ms       P-R-T Axes :  55 -31  87 degrees     QTc Int : 472 ms     Sinus rhythm with marked sinus arrhythmia  Left axis deviation  Left bundle branch block  Abnormal ECG  When compared with ECG of 06-SEP-2023 16:30,  Vent. rate has increased BY  46 BPM  QT has lengthened  Adult Transthoracic Echo Complete W/ Cont if Necessary Per Protocol (09/07/2023 10:04)         Lab Results   Component Value Date    GLUCOSE 105 (H) 09/07/2023    CALCIUM 9.1 09/07/2023     09/07/2023    K 4.4 09/07/2023    CO2 26.5 09/07/2023     09/07/2023    BUN 17 09/07/2023    CREATININE 0.86 09/07/2023    EGFR 71.4 09/07/2023    BCR 19.8 09/07/2023    ANIONGAP 9.5 09/07/2023     Lab Results    Component Value Date    WBC 8.78 09/07/2023    HGB 10.9 (L) 09/07/2023    HCT 36.4 09/07/2023    MCV 93.1 09/07/2023     09/07/2023          Assessment and Plan {CC Problem List  Visit Diagnosis  ROS  Review (Popup)  Health Maintenance  Quality  BestPractice  Medications  SmartSets  SnapShot Encounters  Media :23}   1. Precordial pain  The 10-year ASCVD risk score (Ricky JIN, et al., 2019) is: 34%    Values used to calculate the score:      Age: 72 years      Sex: Female      Is Non- : No      Diabetic: Yes      Tobacco smoker: No      Systolic Blood Pressure: 141 mmHg      Is BP treated: Yes      HDL Cholesterol: 50 mg/dL      Total Cholesterol: 134 mg/dL   - ECG 12 Lead; Future  - Stress Test With Myocardial Perfusion (1 Day); Future  Nitro 0.4 mg given sublingually in office today with very little improvement in chest tightness  2. GANNON (dyspnea on exertion)    - ECG 12 Lead; Future  - Mobile Cardiac Outpatient Telemetry; Future  - Stress Test With Myocardial Perfusion (1 Day); Future    3. Tachycardia    - Mobile Cardiac Outpatient Telemetry; Future  - Stress Test With Myocardial Perfusion (1 Day); Future    4. Dyslipidemia  Stable on Lipitor  - Stress Test With Myocardial Perfusion (1 Day); Future    5. Chronic obstructive pulmonary disease, unspecified COPD type  Patient to follow-up with pulmonologist  - Stress Test With Myocardial Perfusion (1 Day); Future    6. Primary hypertension  Currently stable on lisinopril 10 mg daily  Continue to monitor closely  - Stress Test With Myocardial Perfusion (1 Day); Future    7. Other fatigue    - Stress Test With Myocardial Perfusion (1 Day); Future          Follow Up {Instructions Charge Capture  Follow-up Communications :23}   Return in about 5 weeks (around 10/27/2023) for Telemedicine visit, Monitor results.    Patient was given instructions and counseling regarding her condition or for health maintenance advice. Please  see specific information pulled into the AVS if appropriate.  Patient was instructed to call the [facility] with any questions, concerns, or worsening symptoms.

## 2023-09-25 ENCOUNTER — TELEPHONE (OUTPATIENT)
Dept: PHYSICAL THERAPY | Facility: CLINIC | Age: 73
End: 2023-09-25

## 2023-09-25 ENCOUNTER — TREATMENT (OUTPATIENT)
Dept: PHYSICAL THERAPY | Facility: CLINIC | Age: 73
End: 2023-09-25

## 2023-09-25 DIAGNOSIS — M25.572 CHRONIC PAIN OF LEFT ANKLE: Primary | ICD-10-CM

## 2023-09-25 DIAGNOSIS — G89.29 CHRONIC PAIN OF LEFT ANKLE: Primary | ICD-10-CM

## 2023-09-25 DIAGNOSIS — M25.672 DECREASED RANGE OF MOTION OF LEFT ANKLE: ICD-10-CM

## 2023-09-25 LAB
QT INTERVAL: 382 MS
QTC INTERVAL: 472 MS

## 2023-09-25 PROCEDURE — 97110 THERAPEUTIC EXERCISES: CPT | Performed by: PHYSICAL THERAPIST

## 2023-09-25 NOTE — TELEPHONE ENCOUNTER
Patients cardiologist Ivy Scruggs returned a call letting us know that she is clear for Physical therapy. No limitations.

## 2023-09-25 NOTE — PROGRESS NOTES
Physical Therapy Re Certification Of Plan of Care  Patient: Julieta Frey [facility]   : 1950  Diagnosis/ICD-10 Code:  Chronic pain of left ankle [M25.572, G89.29]  Referring practitioner: Andrés Rodriguez MD  Date of Initial Visit: Type: THERAPY  Noted: 2023  Today's Date: 2023  Patient seen for 8 sessions         Visit Diagnoses:    ICD-10-CM ICD-9-CM   1. Chronic pain of left ankle  M25.572 719.47    G89.29 338.29   2. Decreased range of motion of left ankle  M25.672 719.57       Subjective Questionnaire: LEFS:   Clinical Progress: minimal changes  Home Program Compliance: Yes      Subjective Evaluation    History of Present Illness    Subjective comment: Patient reports that she was admitted to the hospital approximately 2 weeks ago.  She was diagnosed with bradycardia and has been following with a cardiologist. She notes that cardiologist is discussing a possible pacemaker surgery in the future.  Patient reports that her ankle has been doing well, noting no pain.  She worries that she has lost some range of motion since she has been unable to attend therapy.Pain  Current pain ratin  At best pain ratin  At worst pain ratin           Objective          Observations     Additional Ankle/Foot Observation Details  Increased edema along left lateral ankle    Active Range of Motion   Left Ankle/Foot   Plantar flexion: 65 degrees   Inversion: 20 degrees   Eversion: 12 degrees     Additional Active Range of Motion Details  Left ankle DF lacks 27    Strength/Myotome Testing     Left Ankle/Foot   Dorsiflexion: 3+  Plantar flexion: 4-  Inversion: 4-  Eversion: 4-    Right Ankle/Foot   Dorsiflexion: 4+  Plantar flexion: 4+  Inversion: 4+  Eversion: 4+    Ambulation     Comments   Pt amb with decreased stance on left ankle        Assessment & Plan       Assessment  Impairments: abnormal gait, abnormal muscle firing, abnormal muscle tone, abnormal or restricted ROM, activity intolerance,  impaired balance, impaired physical strength, lacks appropriate home exercise program, pain with function and weight-bearing intolerance   Functional limitations: walking, uncomfortable because of pain, standing and unable to perform repetitive tasks   Assessment details: Patient has been attending physical therapy for treatment of chronic left ankle pain; however, patient was hospitalized due to bradycardia.  Patient continues to display decreased left ankle ROM, decreased ankle strength, and increased pain.  She currently notes 4/10 pain at worst.  She currently reports a functional mobility impairment of 58.75% on the LEFS.  She will continue to benefit from skilled PT, with focus on improving ankle ROM, strength, gait, and pain; vitals will be monitored as necessary during treatment sessions.    Vitals assessed prior to today's session at BP:164/95 mmHg, HR: 58 BPM  Prognosis: good    Goals  Plan Goals: STG 6 weeks  1 Pt will be instructed in a HEP.  Met  2 Pt will report pain no greater than 4/10 with ADLs.  Met  3 Pt will demonstrate a 10 degree improvement with left ankle DF.  Ongoing, progressing    LTG 12 weeks  1 PT will demonstrate 4/5 gross ankle strength.  Ongoing  2 Pt will improve her LEFs to less than 40%.  Ongoing, progressing-58.75% impaired  3 Pt will report pain no greater than 2/10 with ADLs.  Ongoing, progressing-up to 4/10 with ADLs      Plan  Therapy options: will be seen for skilled therapy services  Planned modality interventions: cryotherapy, thermotherapy (hydrocollator packs) and ultrasound  Planned therapy interventions: abdominal trunk stabilization, ADL retraining, balance/weight-bearing training, body mechanics training, flexibility, functional ROM exercises, gait training, home exercise program, IADL retraining, manual therapy, neuromuscular re-education, soft tissue mobilization, strengthening, stretching and therapeutic activities  Frequency: 2x week  Duration in weeks:  8  Treatment plan discussed with: patient  Plan details: Re-evaluation   37519    Therapeutic exercise  29456  Therapeutic activity    49057  Neuromuscular re-education   93042  Manual therapy   61517  Gait training  48115    Moist heat/cryotherapy 89650   Ultrasound   84506           Recommendations: Continue as planned  Timeframe: 2 months  Prognosis to achieve goals: good      Timed:         Manual Therapy:         mins  54750;     Therapeutic Exercise:    27     mins  04004;     Neuromuscular Linda:        mins  78024;    Therapeutic Activity:          mins  58372;     Gait Training:           mins  00058;     Ultrasound:          mins  57954;    Ionto                                   mins   54617  Self Care                            mins   27993    Un-Timed:  Electrical Stimulation:         mins  17148 ( );  Dry Needling          mins self-pay  Traction          mins 75880  Re-Eval                           15    mins  09271  Canalith Repos         mins 38655    Timed Treatment:  29   mins   Total Treatment:     44   mins          PT: Elle Mora PT     [place] License:  [id number]    Electronically signed by Elle Mora PT, 09/25/23, 8:19 AM EDT    Certification Period: 9/25/2023 thru 12/23/2023  I certify that the therapy services are furnished while this patient is under my care.  The services outlined above are required by this patient, and will be reviewed every 90 days.         Physician Signature:__________________________________________________    PHYSICIAN: Andrés Rodriguez MD  NPI: [id number]                                      DATE:  :     Please sign and return via fax to [email or web address] . Thank you, [facility]

## 2023-09-27 ENCOUNTER — TREATMENT (OUTPATIENT)
Dept: PHYSICAL THERAPY | Facility: CLINIC | Age: 73
End: 2023-09-27
Payer: MEDICARE

## 2023-09-27 ENCOUNTER — DOCUMENTATION (OUTPATIENT)
Dept: CARDIOLOGY | Facility: HOSPITAL | Age: 73
End: 2023-09-27
Payer: MEDICARE

## 2023-09-27 ENCOUNTER — TELEPHONE (OUTPATIENT)
Dept: CARDIOLOGY | Facility: HOSPITAL | Age: 73
End: 2023-09-27
Payer: MEDICARE

## 2023-09-27 DIAGNOSIS — M25.672 DECREASED RANGE OF MOTION OF LEFT ANKLE: ICD-10-CM

## 2023-09-27 DIAGNOSIS — G89.29 CHRONIC PAIN OF LEFT ANKLE: Primary | ICD-10-CM

## 2023-09-27 DIAGNOSIS — M25.572 CHRONIC PAIN OF LEFT ANKLE: Primary | ICD-10-CM

## 2023-09-27 PROCEDURE — 97110 THERAPEUTIC EXERCISES: CPT | Performed by: PHYSICAL THERAPIST

## 2023-09-27 PROCEDURE — 97530 THERAPEUTIC ACTIVITIES: CPT | Performed by: PHYSICAL THERAPIST

## 2023-09-27 NOTE — PROGRESS NOTES
Patient is currently in ot . Have received transmissions for mobitz type 2 and patient was asymptomatic. Patient has had a few transmissions of mobitz type 2 and noted lightheadedness.

## 2023-09-27 NOTE — TELEPHONE ENCOUNTER
----- Message from Patsy Qureshi MA sent at 9/27/2023 11:26 AM EDT -----  [employer] called with critical EKG report. Stated that she pressed all symptom buttons except pass out . Recording came in at 830 AM Central Time. 2nd degree type 1 heart block with 80 bpm. Report on website and faxed. They said they are not allowed to  call pt's anymore to confirm.

## 2023-09-27 NOTE — PROGRESS NOTES
Physical Therapy Daily Treatment Note      Patient: Julieta Frey [facility]   : 1950  Referring practitioner: Andrés Rodriguez MD  Date of Initial Visit: Type: THERAPY  Noted: 2023  Today's Date: 2023  Patient seen for 9 sessions       Visit Diagnoses:    ICD-10-CM ICD-9-CM   1. Chronic pain of left ankle  M25.572 719.47    G89.29 338.29   2. Decreased range of motion of left ankle  M25.672 719.57       Subjective: Patient reports 1-2/10 left ankle pain prior to tx.     Objective   See Exercise, Manual, and Modality Logs for complete treatment.       Assessment/Plan: Pt's BP assessed at initiation of tx w/ reading of 133/87 mmHg, HR 65 BPM. Today's treatment consisted of therex per flow sheet for left ankle with continued focus on improved range of motion, improved strength and ankle stability. Exercise progressed with ankle tband strengthening reps increased as tolerated. Pt provided with cues and demonstration as needed while performing activities for form and for max benefit. While patient was performing step ups she experienced an episode of dizziness, so patient was brought to seated position and symptoms improved. Pt's BP assessed with reading of 164/88. Pt educated to continue to monitor symptoms and seek medical attention if needed; pt verbalized understanding. No additional complaints reported. Pt will be progressed with therapy as tolerated to address goals, reduce pain and improve functional mobility. No adverse reactions observed during, and/ or following tx. Continue with PT's POC.       Timed:         Manual Therapy:         mins  59541;     Therapeutic Exercise:     30    mins  39731;     Neuromuscular Linda:        mins  67224;    Therapeutic Activity:      9    mins  14784;     Gait Training:           mins  61884;     Ultrasound:          mins  97567;    Ionto                                   mins   12324  Self Care                            mins    89366      Un-Timed:  Electrical Stimulation:         mins  73000 ( );  Dry Needling          mins self-pay  Traction          mins 67630  Canalith Repos         mins 46109    Timed Treatment:   39   mins   Total Treatment:    39    mins    Sophie Lazaro. HERI Rosa  [place] License: [id number]

## 2023-09-29 ENCOUNTER — TELEPHONE (OUTPATIENT)
Dept: CARDIOLOGY | Facility: HOSPITAL | Age: 73
End: 2023-09-29
Payer: MEDICARE

## 2023-09-29 DIAGNOSIS — I10 PRIMARY HYPERTENSION: ICD-10-CM

## 2023-09-29 DIAGNOSIS — I44.1 MOBITZ TYPE 2 SECOND DEGREE ATRIOVENTRICULAR BLOCK: Primary | ICD-10-CM

## 2023-09-29 DIAGNOSIS — E78.5 DYSLIPIDEMIA: ICD-10-CM

## 2023-09-29 DIAGNOSIS — R55 VASOVAGAL SYNCOPE: ICD-10-CM

## 2023-09-29 NOTE — TELEPHONE ENCOUNTER
Spoke with patient due to second-degree type II AV block noted on M cot.  Some transmissions are automatic and some transmissions patient is symptomatic including fatigue, shortness of air and lightheadedness.  We will still plan on stress test scheduled on 10/16 and will refer to cardiology for ongoing evaluation.  Patient verbalized understanding.

## 2023-10-03 ENCOUNTER — TELEPHONE (OUTPATIENT)
Dept: CARDIOLOGY | Facility: HOSPITAL | Age: 73
End: 2023-10-03
Payer: MEDICARE

## 2023-10-03 NOTE — TELEPHONE ENCOUNTER
Raza with [employer] called to report a event on patient heart monitor showing second-degree type II heart block at 48-50 bpm. Symptoms include flutter/skipped beat, lightheadedness, and shortness of air. Report has been uploaded.

## 2023-10-04 ENCOUNTER — TELEPHONE (OUTPATIENT)
Dept: CARDIOLOGY | Facility: HOSPITAL | Age: 73
End: 2023-10-04
Payer: MEDICARE

## 2023-10-04 NOTE — TELEPHONE ENCOUNTER
----- Message from Patsy Qureshi MA sent at 10/4/2023 11:46 AM EDT -----  Pt understood about sending the monitor back early. She also states that she does not want to see Dr Ritter and would like to see some one in the [place] area.

## 2023-10-09 ENCOUNTER — OFFICE VISIT (OUTPATIENT)
Dept: ORTHOPEDIC SURGERY | Facility: CLINIC | Age: 73
End: 2023-10-09
Payer: MEDICARE

## 2023-10-09 VITALS
BODY MASS INDEX: 38.89 KG/M2 | WEIGHT: 206 LBS | SYSTOLIC BLOOD PRESSURE: 168 MMHG | DIASTOLIC BLOOD PRESSURE: 70 MMHG | HEIGHT: 61 IN

## 2023-10-09 DIAGNOSIS — M19.079 ANKLE ARTHRITIS: Primary | ICD-10-CM

## 2023-10-09 PROCEDURE — 3077F SYST BP >= 140 MM HG: CPT | Performed by: ORTHOPAEDIC SURGERY

## 2023-10-09 PROCEDURE — 1160F RVW MEDS BY RX/DR IN RCRD: CPT | Performed by: ORTHOPAEDIC SURGERY

## 2023-10-09 PROCEDURE — 99213 OFFICE O/P EST LOW 20 MIN: CPT | Performed by: ORTHOPAEDIC SURGERY

## 2023-10-09 PROCEDURE — 1159F MED LIST DOCD IN RCRD: CPT | Performed by: ORTHOPAEDIC SURGERY

## 2023-10-09 PROCEDURE — 3078F DIAST BP <80 MM HG: CPT | Performed by: ORTHOPAEDIC SURGERY

## 2023-10-09 RX ORDER — MELOXICAM 7.5 MG/1
7.5 TABLET ORAL DAILY
Qty: 30 TABLET | Refills: 0 | Status: SHIPPED | OUTPATIENT
Start: 2023-10-09

## 2023-10-09 NOTE — PROGRESS NOTES
"                          [facility] Office Follow Up     Office Follow Up Visit     Date: 10/09/2023   Patient Name: Julieta Holt  MRN: [id number]  YOB: 1950  Chief Complaint:   Chief Complaint   Patient presents with    Follow-up     3 month f/u--  Left ankle pain     History of Present Illness:   Julieta oHlt is a 72 y.o. female who is here today for follow up for left ankle arthritis.  Last seen in clinic in July and received intra-articular steroid injection.  States injection helped a little bit but otherwise her symptoms are unchanged.  Was doing physical therapy which patient states was helping with her symptoms however had to discontinue due to bradycardia and now she needs to see a cardiologist for possible procedure.  Patient did not receive her Mobic prescription from last visit and is requesting that be refilled today.    Subjective   I reviewed the patient's chief complaint, history of present illness, review of systems, past medical history, surgical history, family history, social history, medications and allergy list   Objective    Vital Signs:   Vitals:    10/09/23 1027   BP: 168/70   Weight: 93.4 kg (206 lb)   Height: 155.6 cm (61.26\")     Body mass index is 38.59 kg/mý.    Ortho Exam:  left LE Foot and Ankle Exam:   Mildly antalgic gait pattern. Hindfoot alignment is neutral. Plantigrade foot.   Neurological exam of the superficial peroneal, deep peroneal, plantar, sural and saphenous nerves demonstrates intact sensation and normal motor function.  There is good perfusion to the toes.   The skin is intact throughout the foot and ankle without ulceration.   Range of motion of subtalar joint, midfoot and toes is within normal limits.   There is tenderness to palpation about tibiotalar joint line, pain with passive ROM of ankle, limited passive/active ankle ROM.  No instability.    Results Review:  No new imaging    Assessment / Plan  "   Assessment/Plan:   Diagnoses and all orders for this visit:    1. Ankle arthritis (Primary)  -     meloxicam (Mobic) 7.5 MG tablet; Take 1 tablet by mouth Daily.  Dispense: 30 tablet; Refill: 0  -     Ambulatory Referral For Orthotics      Returns to clinic for continued treatment of her left ankle arthritis.  Patient states she is not interested in getting a steroid injection today.  A prescription was placed for Mobic, 30 days.  Also provided patient with referral to [facility] for custom AFO.  Patient states she plans on focusing on getting her heart condition treated and will plan to follow-up in clinic on an as-needed basis.  Is a pleasure seeing her today.    Patient suffers from pain and foot/ankle instability.  I am ordering custom ankle foot orthoses to stabilize and reduce pain.  Custom required for tri-plane control, deformity, lifetime need.     Follow Up:   Return if symptoms worsen or fail to improve.      Andrés Rodriguez MD  [facility]

## 2023-10-10 ENCOUNTER — APPOINTMENT (OUTPATIENT)
Dept: GENERAL RADIOLOGY | Facility: HOSPITAL | Age: 73
End: 2023-10-10
Payer: MEDICARE

## 2023-10-10 ENCOUNTER — HOSPITAL ENCOUNTER (EMERGENCY)
Facility: HOSPITAL | Age: 73
Discharge: HOME | End: 2023-10-10
Attending: STUDENT IN AN ORGANIZED HEALTH CARE EDUCATION/TRAINING PROGRAM | Admitting: STUDENT IN AN ORGANIZED HEALTH CARE EDUCATION/TRAINING PROGRAM
Payer: MEDICARE

## 2023-10-10 VITALS
WEIGHT: 204 LBS | HEART RATE: 50 BPM | OXYGEN SATURATION: 97 % | TEMPERATURE: 97.6 F | DIASTOLIC BLOOD PRESSURE: 81 MMHG | BODY MASS INDEX: 38.51 KG/M2 | SYSTOLIC BLOOD PRESSURE: 136 MMHG | HEIGHT: 61 IN | RESPIRATION RATE: 20 BRPM

## 2023-10-10 DIAGNOSIS — R07.89 ATYPICAL CHEST PAIN: ICD-10-CM

## 2023-10-10 DIAGNOSIS — K21.9 GASTROESOPHAGEAL REFLUX DISEASE, UNSPECIFIED WHETHER ESOPHAGITIS PRESENT: Primary | ICD-10-CM

## 2023-10-10 LAB
ALBUMIN SERPL-MCNC: 4.1 G/DL (ref 3.5–5.2)
ALBUMIN/GLOB SERPL: 1.5 G/DL
ALP SERPL-CCNC: 92 U/L (ref 39–117)
ALT SERPL W P-5'-P-CCNC: 23 U/L (ref 1–33)
ANION GAP SERPL CALCULATED.3IONS-SCNC: 8.9 MMOL/L (ref 5–15)
AST SERPL-CCNC: 16 U/L (ref 1–32)
BASOPHILS # BLD AUTO: 0.06 10*3/MM3 (ref 0–0.2)
BASOPHILS NFR BLD AUTO: 0.7 % (ref 0–1.5)
BILIRUB SERPL-MCNC: 0.4 MG/DL (ref 0–1.2)
BUN SERPL-MCNC: 16 MG/DL (ref 8–23)
BUN/CREAT SERPL: 18 (ref 7–25)
CALCIUM SPEC-SCNC: 9.6 MG/DL (ref 8.6–10.5)
CHLORIDE SERPL-SCNC: 106 MMOL/L (ref 98–107)
CO2 SERPL-SCNC: 29.1 MMOL/L (ref 22–29)
CREAT SERPL-MCNC: 0.89 MG/DL (ref 0.57–1)
D-LACTATE SERPL-SCNC: 1.5 MMOL/L (ref 0.5–2)
D-LACTATE SERPL-SCNC: 2.3 MMOL/L (ref 0.5–2)
DEPRECATED RDW RBC AUTO: 47.2 FL (ref 37–54)
EGFRCR SERPLBLD CKD-EPI 2021: 68.6 ML/MIN/1.73
EOSINOPHIL # BLD AUTO: 0.12 10*3/MM3 (ref 0–0.4)
EOSINOPHIL NFR BLD AUTO: 1.4 % (ref 0.3–6.2)
ERYTHROCYTE [DISTWIDTH] IN BLOOD BY AUTOMATED COUNT: 13.7 % (ref 12.3–15.4)
GEN 5 2HR TROPONIN T REFLEX: 17 NG/L
GLOBULIN UR ELPH-MCNC: 2.7 GM/DL
GLUCOSE SERPL-MCNC: 120 MG/DL (ref 65–99)
HCT VFR BLD AUTO: 37.7 % (ref 34–46.6)
HGB BLD-MCNC: 11.6 G/DL (ref 12–15.9)
HOLD SPECIMEN: NORMAL
HOLD SPECIMEN: NORMAL
IMM GRANULOCYTES # BLD AUTO: 0.05 10*3/MM3 (ref 0–0.05)
IMM GRANULOCYTES NFR BLD AUTO: 0.6 % (ref 0–0.5)
INR PPP: 0.96 (ref 0.9–1.1)
LYMPHOCYTES # BLD AUTO: 1.73 10*3/MM3 (ref 0.7–3.1)
LYMPHOCYTES NFR BLD AUTO: 19.7 % (ref 19.6–45.3)
MCH RBC QN AUTO: 28.6 PG (ref 26.6–33)
MCHC RBC AUTO-ENTMCNC: 30.8 G/DL (ref 31.5–35.7)
MCV RBC AUTO: 92.9 FL (ref 79–97)
MONOCYTES # BLD AUTO: 0.67 10*3/MM3 (ref 0.1–0.9)
MONOCYTES NFR BLD AUTO: 7.6 % (ref 5–12)
NEUTROPHILS NFR BLD AUTO: 6.17 10*3/MM3 (ref 1.7–7)
NEUTROPHILS NFR BLD AUTO: 70 % (ref 42.7–76)
NRBC BLD AUTO-RTO: 0 /100 WBC (ref 0–0.2)
PLATELET # BLD AUTO: 223 10*3/MM3 (ref 140–450)
PMV BLD AUTO: 11.3 FL (ref 6–12)
POTASSIUM SERPL-SCNC: 4 MMOL/L (ref 3.5–5.2)
PROT SERPL-MCNC: 6.8 G/DL (ref 6–8.5)
PROTHROMBIN TIME: 13.3 SECONDS (ref 12.1–14.7)
QT INTERVAL: 542 MS
QTC INTERVAL: 554 MS
RBC # BLD AUTO: 4.06 10*6/MM3 (ref 3.77–5.28)
SODIUM SERPL-SCNC: 144 MMOL/L (ref 136–145)
TROPONIN T DELTA: 3 NG/L
TROPONIN T SERPL HS-MCNC: 14 NG/L
WBC NRBC COR # BLD: 8.8 10*3/MM3 (ref 3.4–10.8)
WHOLE BLOOD HOLD COAG: NORMAL
WHOLE BLOOD HOLD SPECIMEN: NORMAL

## 2023-10-10 PROCEDURE — 71045 X-RAY EXAM CHEST 1 VIEW: CPT | Performed by: RADIOLOGY

## 2023-10-10 PROCEDURE — 83605 ASSAY OF LACTIC ACID: CPT | Performed by: STUDENT IN AN ORGANIZED HEALTH CARE EDUCATION/TRAINING PROGRAM

## 2023-10-10 PROCEDURE — 85025 COMPLETE CBC W/AUTO DIFF WBC: CPT | Performed by: STUDENT IN AN ORGANIZED HEALTH CARE EDUCATION/TRAINING PROGRAM

## 2023-10-10 PROCEDURE — 36415 COLL VENOUS BLD VENIPUNCTURE: CPT

## 2023-10-10 PROCEDURE — 80053 COMPREHEN METABOLIC PANEL: CPT | Performed by: STUDENT IN AN ORGANIZED HEALTH CARE EDUCATION/TRAINING PROGRAM

## 2023-10-10 PROCEDURE — 71045 X-RAY EXAM CHEST 1 VIEW: CPT

## 2023-10-10 PROCEDURE — 93005 ELECTROCARDIOGRAM TRACING: CPT | Performed by: STUDENT IN AN ORGANIZED HEALTH CARE EDUCATION/TRAINING PROGRAM

## 2023-10-10 PROCEDURE — 85610 PROTHROMBIN TIME: CPT | Performed by: STUDENT IN AN ORGANIZED HEALTH CARE EDUCATION/TRAINING PROGRAM

## 2023-10-10 PROCEDURE — 84484 ASSAY OF TROPONIN QUANT: CPT | Performed by: STUDENT IN AN ORGANIZED HEALTH CARE EDUCATION/TRAINING PROGRAM

## 2023-10-10 PROCEDURE — 99284 EMERGENCY DEPT VISIT MOD MDM: CPT

## 2023-10-10 RX ORDER — ASPIRIN 81 MG/1
324 TABLET, CHEWABLE ORAL ONCE
Status: COMPLETED | OUTPATIENT
Start: 2023-10-10 | End: 2023-10-10

## 2023-10-10 RX ORDER — PANTOPRAZOLE SODIUM 40 MG/1
40 TABLET, DELAYED RELEASE ORAL DAILY
Qty: 30 TABLET | Refills: 0 | Status: SHIPPED | OUTPATIENT
Start: 2023-10-10

## 2023-10-10 RX ORDER — SODIUM CHLORIDE 0.9 % (FLUSH) 0.9 %
10 SYRINGE (ML) INJECTION AS NEEDED
Status: DISCONTINUED | OUTPATIENT
Start: 2023-10-10 | End: 2023-10-10 | Stop reason: HOSPADM

## 2023-10-10 RX ADMIN — ASPIRIN 324 MG: 81 TABLET, CHEWABLE ORAL at 10:10

## 2023-10-10 NOTE — Clinical Note
[facility] EMERGENCY DEPARTMENT  [address] [address]  Phone: [phone]    Julieta Holt was seen and treated in our emergency department on 10/10/2023.  She may return to work on 10/11/2023.         Thank you for choosing [facility].    Mert Gale, RN
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
## 2023-10-12 ENCOUNTER — OFFICE VISIT (OUTPATIENT)
Dept: PULMONOLOGY | Facility: CLINIC | Age: 73
End: 2023-10-12
Payer: MEDICARE

## 2023-10-12 VITALS
HEIGHT: 61 IN | RESPIRATION RATE: 18 BRPM | WEIGHT: 204 LBS | HEART RATE: 76 BPM | DIASTOLIC BLOOD PRESSURE: 80 MMHG | TEMPERATURE: 98 F | OXYGEN SATURATION: 95 % | BODY MASS INDEX: 38.51 KG/M2 | SYSTOLIC BLOOD PRESSURE: 130 MMHG

## 2023-10-12 DIAGNOSIS — Z23 IMMUNIZATION DUE: ICD-10-CM

## 2023-10-12 DIAGNOSIS — J44.9 CHRONIC OBSTRUCTIVE PULMONARY DISEASE, UNSPECIFIED COPD TYPE: ICD-10-CM

## 2023-10-12 DIAGNOSIS — E66.01 CLASS 2 SEVERE OBESITY DUE TO EXCESS CALORIES WITH SERIOUS COMORBIDITY AND BODY MASS INDEX (BMI) OF 36.0 TO 36.9 IN ADULT: ICD-10-CM

## 2023-10-12 DIAGNOSIS — G47.33 OSA (OBSTRUCTIVE SLEEP APNEA): Primary | ICD-10-CM

## 2023-10-12 RX ORDER — IPRATROPIUM BROMIDE AND ALBUTEROL SULFATE 2.5; .5 MG/3ML; MG/3ML
3 SOLUTION RESPIRATORY (INHALATION) 4 TIMES DAILY PRN
Qty: 360 ML | Refills: 3 | Status: SHIPPED | OUTPATIENT
Start: 2023-10-12

## 2023-10-12 RX ORDER — FEXOFENADINE HCL 180 MG/1
180 TABLET ORAL DAILY
Qty: 30 TABLET | Refills: 5 | Status: SHIPPED | OUTPATIENT
Start: 2023-10-12

## 2023-10-12 NOTE — PROGRESS NOTES
"Chief Complaint  Sleep Apnea    Subjective        Julieta Tk Holt presents to [facility] PULMONARY & CRITICAL CARE MEDICINE  History of Present Illness      Ms. Holt is a 72 year old female with a medical history significant for hypertension, arthritis, COPD, depression, hypertension, and seizure.    She presents today for follow up on sleep apnea and COPD.  She states that she has been having issues with bradycardia, tachycardia and her heart rate.  She states that when she does have these problems she also has shortness of breath.  She is taking her Trelegy once daily and using albuterol as needed.  She states that she has her cpap but that she has not started using it yet.        Objective   Vital Signs:  /80   Pulse 76   Temp 98 øF (36.7 øC) (Temporal)   Resp 18   Ht 154.9 cm (60.98\")   Wt 92.5 kg (204 lb)   SpO2 95%   BMI 38.57 kg/mý   Estimated body mass index is 38.57 kg/mý as calculated from the following:    Height as of this encounter: 154.9 cm (60.98\").    Weight as of this encounter: 92.5 kg (204 lb).               Physical Exam     GENERAL APPEARANCE: Well developed, well nourished, alert and cooperative, and appears to be in no acute distress.    HEAD: normocephalic. Atraumatic.    EYES: PERRL, EOMI. Vision is grossly intact.    THROAT: Oral cavity and pharynx normal. No inflammation, swelling, exudate, or lesions.     NECK: Neck supple.  No thyromegaly.    CARDIAC: Normal S1 and S2. No S3, S4 or murmurs. Rhythm is regular.     RESPIRATORY:Bilateral air entry positive. Bilateral diminished breath sounds. No wheezing, crackles or rhonchi noted.    GI: Positive bowel sounds. Soft, nondistended, nontender.     MUSCULOSKELETAL: No significant deformity or joint abnormality. No edema. Peripheral pulses intact. No varicosities.    NEUROLOGICAL: Strength and sensation symmetric and intact throughout.     PSYCHIATRIC: The mental examination revealed the " patient was oriented to person, place, and time.     Result Review :  The following data was reviewed by: MARY Chu on 10/12/2023:  Common labs          9/6/2023    09:05 9/6/2023    10:06 9/7/2023    02:06 10/10/2023    10:02   Common Labs   Glucose 121   105  120    BUN 16   17  16    Creatinine 1.02   0.86  0.89    Sodium 141   143  144    Potassium 3.8   4.4  4.0    Chloride 103   107  106    Calcium 10.3   9.1  9.6    Albumin 4.3   3.6  4.1    Total Bilirubin 0.4   0.3  0.4    Alkaline Phosphatase 131   102  92    AST (SGOT) 22   17  16    ALT (SGPT) 48   33  23    WBC  8.70  8.78  8.80    Hemoglobin  11.3  10.9  11.6    Hematocrit  37.9  36.4  37.7    Platelets  212  218  223    Hemoglobin A1C  5.70                   Assessment and Plan   Diagnoses and all orders for this visit:    1. CHRISTIAN (obstructive sleep apnea) (Primary)    2. Immunization due  -     pneumococcal polysaccharide 23-valent (PNEUMOVAX-23) vaccine 0.5 mL    3. Chronic obstructive pulmonary disease, unspecified COPD type    4. Class 2 severe obesity due to excess calories with serious comorbidity and body mass index (BMI) of 36.0 to 36.9 in adult    Other orders  -     ipratropium-albuterol (DUO-NEB) 0.5-2.5 mg/3 ml nebulizer; Take 3 mL by nebulization 4 (Four) Times a Day As Needed for Wheezing.  Dispense: 360 mL; Refill: 3  -     fexofenadine (Allegra Allergy) 180 MG tablet; Take 1 tablet by mouth Daily.  Dispense: 30 tablet; Refill: 5        Moderate sleep apnea.  She states that she a cpap machine but has not started using it yet.     Advised her that her untreated sleep apnea could be why she is having so many problems with her heat rate and blood pressure.    She states that she will start using it.    Continue albuterol as needed.  Continue Trelegy once daily.    Follow Up   Return in about 3 months (around 1/12/2024).  Patient was given instructions and counseling regarding her condition or for health maintenance  advice. Please see specific information pulled into the AVS if appropriate.

## 2023-10-16 ENCOUNTER — HOSPITAL ENCOUNTER (OUTPATIENT)
Dept: CARDIOLOGY | Facility: HOSPITAL | Age: 73
Discharge: HOME | End: 2023-10-16
Payer: MEDICARE

## 2023-10-16 DIAGNOSIS — J44.9 CHRONIC OBSTRUCTIVE PULMONARY DISEASE, UNSPECIFIED COPD TYPE: ICD-10-CM

## 2023-10-16 DIAGNOSIS — I10 PRIMARY HYPERTENSION: ICD-10-CM

## 2023-10-16 DIAGNOSIS — E78.5 DYSLIPIDEMIA: ICD-10-CM

## 2023-10-16 DIAGNOSIS — R00.0 TACHYCARDIA: ICD-10-CM

## 2023-10-16 DIAGNOSIS — R06.09 DOE (DYSPNEA ON EXERTION): ICD-10-CM

## 2023-10-16 DIAGNOSIS — R07.2 PRECORDIAL PAIN: ICD-10-CM

## 2023-10-16 DIAGNOSIS — R53.83 OTHER FATIGUE: ICD-10-CM

## 2023-10-23 PROBLEM — R07.89 OTHER CHEST PAIN: Status: ACTIVE | Noted: 2017-10-25

## 2023-10-23 PROBLEM — I45.9 HEART BLOCK: Status: ACTIVE | Noted: 2023-10-23
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
## 2023-10-24 ENCOUNTER — OFFICE VISIT (OUTPATIENT)
Dept: CARDIOLOGY | Facility: CLINIC | Age: 73
End: 2023-10-24
Payer: MEDICARE

## 2023-10-24 VITALS
DIASTOLIC BLOOD PRESSURE: 62 MMHG | WEIGHT: 210.6 LBS | SYSTOLIC BLOOD PRESSURE: 138 MMHG | BODY MASS INDEX: 39.76 KG/M2 | HEART RATE: 83 BPM | OXYGEN SATURATION: 92 % | HEIGHT: 61 IN

## 2023-10-24 DIAGNOSIS — R07.89 OTHER CHEST PAIN: ICD-10-CM

## 2023-10-24 DIAGNOSIS — I44.1 HEART BLOCK AV SECOND DEGREE: ICD-10-CM

## 2023-10-24 DIAGNOSIS — I45.9 HEART BLOCK: Primary | ICD-10-CM

## 2023-10-24 DIAGNOSIS — I10 PRIMARY HYPERTENSION: ICD-10-CM

## 2023-10-24 DIAGNOSIS — R55 SYNCOPE, UNSPECIFIED SYNCOPE TYPE: ICD-10-CM

## 2023-10-24 DIAGNOSIS — E78.5 DYSLIPIDEMIA: ICD-10-CM

## 2023-10-24 DIAGNOSIS — R00.1 BRADYCARDIA: ICD-10-CM

## 2023-10-24 DIAGNOSIS — I44.1 AV BLOCK, MOBITZ II: ICD-10-CM

## 2023-10-27 ENCOUNTER — HOSPITAL ENCOUNTER (OUTPATIENT)
Dept: CARDIOLOGY | Facility: HOSPITAL | Age: 73
Discharge: HOME | End: 2023-10-27

## 2023-10-27 DIAGNOSIS — Z00.6 ENCOUNTER FOR EXAMINATION FOR NORMAL COMPARISON AND CONTROL IN CLINICAL RESEARCH PROGRAM: ICD-10-CM

## 2023-10-30 PROBLEM — I44.1 HEART BLOCK AV SECOND DEGREE: Status: ACTIVE | Noted: 2023-10-24

## 2023-10-30 PROBLEM — I44.1 AV BLOCK, MOBITZ II: Status: ACTIVE | Noted: 2023-10-24

## 2023-11-01 ENCOUNTER — PREP FOR SURGERY (OUTPATIENT)
Dept: OTHER | Facility: HOSPITAL | Age: 73
End: 2023-11-01
Payer: MEDICARE

## 2023-11-01 RX ORDER — SODIUM CHLORIDE 0.9 % (FLUSH) 0.9 %
10 SYRINGE (ML) INJECTION AS NEEDED
OUTPATIENT
Start: 2023-11-01

## 2023-11-01 RX ORDER — SODIUM CHLORIDE 0.9 % (FLUSH) 0.9 %
10 SYRINGE (ML) INJECTION EVERY 12 HOURS SCHEDULED
OUTPATIENT
Start: 2023-11-01

## 2023-11-01 RX ORDER — CEFAZOLIN SODIUM 2 G/100ML
2000 INJECTION, SOLUTION INTRAVENOUS ONCE
OUTPATIENT
Start: 2023-11-01 | End: 2023-11-01

## 2023-11-01 RX ORDER — SODIUM CHLORIDE 9 MG/ML
40 INJECTION, SOLUTION INTRAVENOUS AS NEEDED
OUTPATIENT
Start: 2023-11-01

## 2023-11-16 ENCOUNTER — HOSPITAL ENCOUNTER (OUTPATIENT)
Facility: HOSPITAL | Age: 73
Discharge: HOME | End: 2023-11-17
Attending: INTERNAL MEDICINE | Admitting: INTERNAL MEDICINE
Payer: MEDICARE

## 2023-11-16 DIAGNOSIS — I45.9 HEART BLOCK: ICD-10-CM

## 2023-11-16 DIAGNOSIS — R00.1 BRADYCARDIA: ICD-10-CM

## 2023-11-16 DIAGNOSIS — I44.1 HEART BLOCK AV SECOND DEGREE: ICD-10-CM

## 2023-11-16 DIAGNOSIS — I44.1 AV BLOCK, MOBITZ II: ICD-10-CM

## 2023-11-16 LAB
ANION GAP SERPL CALCULATED.3IONS-SCNC: 9 MMOL/L (ref 5–15)
BUN SERPL-MCNC: 19 MG/DL (ref 8–23)
BUN/CREAT SERPL: 21.6 (ref 7–25)
CALCIUM SPEC-SCNC: 9.9 MG/DL (ref 8.6–10.5)
CHLORIDE SERPL-SCNC: 104 MMOL/L (ref 98–107)
CO2 SERPL-SCNC: 29 MMOL/L (ref 22–29)
CREAT SERPL-MCNC: 0.88 MG/DL (ref 0.57–1)
DEPRECATED RDW RBC AUTO: 43.4 FL (ref 37–54)
EGFRCR SERPLBLD CKD-EPI 2021: 69.5 ML/MIN/1.73
ERYTHROCYTE [DISTWIDTH] IN BLOOD BY AUTOMATED COUNT: 13.1 % (ref 12.3–15.4)
GLUCOSE SERPL-MCNC: 114 MG/DL (ref 65–99)
HCT VFR BLD AUTO: 43 % (ref 34–46.6)
HGB BLD-MCNC: 13.2 G/DL (ref 12–15.9)
MCH RBC QN AUTO: 27.7 PG (ref 26.6–33)
MCHC RBC AUTO-ENTMCNC: 30.7 G/DL (ref 31.5–35.7)
MCV RBC AUTO: 90.1 FL (ref 79–97)
PLATELET # BLD AUTO: 251 10*3/MM3 (ref 140–450)
PMV BLD AUTO: 11 FL (ref 6–12)
POTASSIUM SERPL-SCNC: 4.2 MMOL/L (ref 3.5–5.2)
RBC # BLD AUTO: 4.77 10*6/MM3 (ref 3.77–5.28)
SODIUM SERPL-SCNC: 142 MMOL/L (ref 136–145)
WBC NRBC COR # BLD: 7.53 10*3/MM3 (ref 3.4–10.8)

## 2023-11-16 PROCEDURE — 33208 INSRT HEART PM ATRIAL & VENT: CPT | Performed by: INTERNAL MEDICINE

## 2023-11-16 PROCEDURE — 25010000002 FENTANYL CITRATE (PF) 50 MCG/ML SOLUTION: Performed by: INTERNAL MEDICINE

## 2023-11-16 PROCEDURE — 63710000001 METOPROLOL TARTRATE 25 MG TABLET: Performed by: INTERNAL MEDICINE

## 2023-11-16 PROCEDURE — A9270 NON-COVERED ITEM OR SERVICE: HCPCS | Performed by: INTERNAL MEDICINE

## 2023-11-16 PROCEDURE — 25010000002 CEFAZOLIN PER 500 MG: Performed by: INTERNAL MEDICINE

## 2023-11-16 PROCEDURE — 99153 MOD SED SAME PHYS/QHP EA: CPT | Performed by: INTERNAL MEDICINE

## 2023-11-16 PROCEDURE — 85027 COMPLETE CBC AUTOMATED: CPT | Performed by: INTERNAL MEDICINE

## 2023-11-16 PROCEDURE — C1785 PMKR, DUAL, RATE-RESP: HCPCS | Performed by: INTERNAL MEDICINE

## 2023-11-16 PROCEDURE — 63710000001 PANTOPRAZOLE 40 MG TABLET DELAYED-RELEASE: Performed by: INTERNAL MEDICINE

## 2023-11-16 PROCEDURE — 99152 MOD SED SAME PHYS/QHP 5/>YRS: CPT | Performed by: INTERNAL MEDICINE

## 2023-11-16 PROCEDURE — 25010000002 MIDAZOLAM PER 1 MG: Performed by: INTERNAL MEDICINE

## 2023-11-16 PROCEDURE — C1892 INTRO/SHEATH,FIXED,PEEL-AWAY: HCPCS | Performed by: INTERNAL MEDICINE

## 2023-11-16 PROCEDURE — 25010000002 CEFAZOLIN PER 500 MG

## 2023-11-16 PROCEDURE — G0378 HOSPITAL OBSERVATION PER HR: HCPCS

## 2023-11-16 PROCEDURE — 63710000001 ACETAMINOPHEN 325 MG TABLET: Performed by: INTERNAL MEDICINE

## 2023-11-16 PROCEDURE — 80048 BASIC METABOLIC PNL TOTAL CA: CPT | Performed by: INTERNAL MEDICINE

## 2023-11-16 PROCEDURE — C1898 LEAD, PMKR, OTHER THAN TRANS: HCPCS | Performed by: INTERNAL MEDICINE

## 2023-11-16 PROCEDURE — 63710000001 ASPIRIN 81 MG TABLET DELAYED-RELEASE: Performed by: INTERNAL MEDICINE

## 2023-11-16 PROCEDURE — 63710000001 ATORVASTATIN 10 MG TABLET: Performed by: INTERNAL MEDICINE

## 2023-11-16 DEVICE — LD PM TENDRIL STS 6F52CM 2088TC52: Type: IMPLANTABLE DEVICE | Site: HEART | Status: FUNCTIONAL

## 2023-11-16 DEVICE — LD PM TENDRIL STS 6F46CM 2088TC46: Type: IMPLANTABLE DEVICE | Site: HEART | Status: FUNCTIONAL

## 2023-11-16 DEVICE — GEN PM ASSURITY MRI DR RF PM2272: Type: IMPLANTABLE DEVICE | Site: HEART | Status: FUNCTIONAL

## 2023-11-16 RX ORDER — ALBUTEROL SULFATE 2.5 MG/3ML
2.5 SOLUTION RESPIRATORY (INHALATION) EVERY 6 HOURS PRN
Status: DISCONTINUED | OUTPATIENT
Start: 2023-11-16 | End: 2023-11-17 | Stop reason: HOSPADM

## 2023-11-16 RX ORDER — PANTOPRAZOLE SODIUM 40 MG/1
40 TABLET, DELAYED RELEASE ORAL NIGHTLY
Status: DISCONTINUED | OUTPATIENT
Start: 2023-11-16 | End: 2023-11-17 | Stop reason: HOSPADM

## 2023-11-16 RX ORDER — SODIUM CHLORIDE 0.9 % (FLUSH) 0.9 %
10 SYRINGE (ML) INJECTION EVERY 12 HOURS SCHEDULED
Status: DISCONTINUED | OUTPATIENT
Start: 2023-11-16 | End: 2023-11-17 | Stop reason: HOSPADM

## 2023-11-16 RX ORDER — SODIUM CHLORIDE 9 MG/ML
40 INJECTION, SOLUTION INTRAVENOUS AS NEEDED
Status: DISCONTINUED | OUTPATIENT
Start: 2023-11-16 | End: 2023-11-16 | Stop reason: HOSPADM

## 2023-11-16 RX ORDER — SODIUM CHLORIDE 0.9 % (FLUSH) 0.9 %
10 SYRINGE (ML) INJECTION EVERY 12 HOURS SCHEDULED
Status: DISCONTINUED | OUTPATIENT
Start: 2023-11-16 | End: 2023-11-16 | Stop reason: HOSPADM

## 2023-11-16 RX ORDER — SODIUM CHLORIDE 0.9 % (FLUSH) 0.9 %
10 SYRINGE (ML) INJECTION AS NEEDED
Status: DISCONTINUED | OUTPATIENT
Start: 2023-11-16 | End: 2023-11-17 | Stop reason: HOSPADM

## 2023-11-16 RX ORDER — ACETAMINOPHEN 325 MG/1
650 TABLET ORAL EVERY 4 HOURS PRN
Status: DISCONTINUED | OUTPATIENT
Start: 2023-11-16 | End: 2023-11-17 | Stop reason: HOSPADM

## 2023-11-16 RX ORDER — MIDAZOLAM HYDROCHLORIDE 1 MG/ML
INJECTION INTRAMUSCULAR; INTRAVENOUS
Status: DISCONTINUED | OUTPATIENT
Start: 2023-11-16 | End: 2023-11-16 | Stop reason: HOSPADM

## 2023-11-16 RX ORDER — LIDOCAINE HYDROCHLORIDE 10 MG/ML
INJECTION, SOLUTION EPIDURAL; INFILTRATION; INTRACAUDAL; PERINEURAL
Status: DISCONTINUED | OUTPATIENT
Start: 2023-11-16 | End: 2023-11-16 | Stop reason: HOSPADM

## 2023-11-16 RX ORDER — IPRATROPIUM BROMIDE AND ALBUTEROL SULFATE 2.5; .5 MG/3ML; MG/3ML
3 SOLUTION RESPIRATORY (INHALATION) 4 TIMES DAILY PRN
Status: DISCONTINUED | OUTPATIENT
Start: 2023-11-16 | End: 2023-11-17 | Stop reason: HOSPADM

## 2023-11-16 RX ORDER — ATORVASTATIN CALCIUM 10 MG/1
10 TABLET, FILM COATED ORAL NIGHTLY
Status: DISCONTINUED | OUTPATIENT
Start: 2023-11-16 | End: 2023-11-17 | Stop reason: HOSPADM

## 2023-11-16 RX ORDER — ACETAMINOPHEN 650 MG/1
650 SUPPOSITORY RECTAL EVERY 4 HOURS PRN
Status: DISCONTINUED | OUTPATIENT
Start: 2023-11-16 | End: 2023-11-17 | Stop reason: HOSPADM

## 2023-11-16 RX ORDER — FENTANYL CITRATE 50 UG/ML
INJECTION, SOLUTION INTRAMUSCULAR; INTRAVENOUS
Status: DISCONTINUED | OUTPATIENT
Start: 2023-11-16 | End: 2023-11-16 | Stop reason: HOSPADM

## 2023-11-16 RX ORDER — ASPIRIN 81 MG/1
81 TABLET ORAL DAILY
Status: DISCONTINUED | OUTPATIENT
Start: 2023-11-16 | End: 2023-11-17 | Stop reason: HOSPADM

## 2023-11-16 RX ORDER — SODIUM CHLORIDE 0.9 % (FLUSH) 0.9 %
10 SYRINGE (ML) INJECTION AS NEEDED
Status: DISCONTINUED | OUTPATIENT
Start: 2023-11-16 | End: 2023-11-16 | Stop reason: HOSPADM

## 2023-11-16 RX ORDER — LISINOPRIL 10 MG/1
10 TABLET ORAL DAILY
Status: DISCONTINUED | OUTPATIENT
Start: 2023-11-17 | End: 2023-11-17 | Stop reason: HOSPADM

## 2023-11-16 RX ADMIN — ACETAMINOPHEN 650 MG: 325 TABLET ORAL at 15:37

## 2023-11-16 RX ADMIN — SODIUM CHLORIDE 2000 MG: 900 INJECTION INTRAVENOUS at 07:55

## 2023-11-16 RX ADMIN — METOPROLOL TARTRATE 25 MG: 25 TABLET, FILM COATED ORAL at 11:06

## 2023-11-16 RX ADMIN — ASPIRIN 81 MG: 81 TABLET, COATED ORAL at 11:06

## 2023-11-16 RX ADMIN — Medication 10 ML: at 20:07

## 2023-11-16 RX ADMIN — Medication 10 ML: at 11:06

## 2023-11-16 RX ADMIN — METOPROLOL TARTRATE 25 MG: 25 TABLET, FILM COATED ORAL at 20:07

## 2023-11-16 RX ADMIN — SODIUM CHLORIDE 2000 MG: 900 INJECTION INTRAVENOUS at 16:58

## 2023-11-16 RX ADMIN — ATORVASTATIN CALCIUM 10 MG: 10 TABLET, FILM COATED ORAL at 20:07

## 2023-11-16 RX ADMIN — PANTOPRAZOLE SODIUM 40 MG: 40 TABLET, DELAYED RELEASE ORAL at 20:07

## 2023-11-16 NOTE — Clinical Note
Prepped: chest. Prepped with: ChloraPrep. The site was clipped. The patient was draped in a sterile fashion.

## 2023-11-16 NOTE — INTERVAL H&P NOTE
H&P reviewed. The patient was examined and there are no changes to the H&P.  Patient presents for pacemaker implant today with Dr. Rosales.  Labs reviewed.  The risks, benefits, and alternatives of the procedure have been reviewed and the patient wishes to proceed.     Electronically signed by MARY Maurice, 11/16/23, 7:57 AM EST.

## 2023-11-16 NOTE — PLAN OF CARE
Goal Outcome Evaluation:              Outcome Evaluation: pt on RA. VSS. given prn tylenol X 1. No acute events during the shift.

## 2023-11-17 ENCOUNTER — APPOINTMENT (OUTPATIENT)
Dept: GENERAL RADIOLOGY | Facility: HOSPITAL | Age: 73
End: 2023-11-17
Payer: MEDICARE

## 2023-11-17 VITALS
HEART RATE: 66 BPM | TEMPERATURE: 98.2 F | BODY MASS INDEX: 38.42 KG/M2 | SYSTOLIC BLOOD PRESSURE: 138 MMHG | WEIGHT: 203.48 LBS | HEIGHT: 61 IN | DIASTOLIC BLOOD PRESSURE: 50 MMHG | OXYGEN SATURATION: 93 % | RESPIRATION RATE: 16 BRPM

## 2023-11-17 LAB
INR PPP: 0.99 (ref 0.89–1.12)
PROTHROMBIN TIME: 13.2 SECONDS (ref 12.2–14.5)

## 2023-11-17 PROCEDURE — 85610 PROTHROMBIN TIME: CPT | Performed by: INTERNAL MEDICINE

## 2023-11-17 PROCEDURE — 63710000001 METOPROLOL TARTRATE 25 MG TABLET: Performed by: INTERNAL MEDICINE

## 2023-11-17 PROCEDURE — 25010000002 CEFAZOLIN PER 500 MG: Performed by: INTERNAL MEDICINE

## 2023-11-17 PROCEDURE — A9270 NON-COVERED ITEM OR SERVICE: HCPCS | Performed by: INTERNAL MEDICINE

## 2023-11-17 PROCEDURE — 71046 X-RAY EXAM CHEST 2 VIEWS: CPT

## 2023-11-17 PROCEDURE — 63710000001 LISINOPRIL 10 MG TABLET: Performed by: INTERNAL MEDICINE

## 2023-11-17 PROCEDURE — G0378 HOSPITAL OBSERVATION PER HR: HCPCS

## 2023-11-17 PROCEDURE — 93005 ELECTROCARDIOGRAM TRACING: CPT | Performed by: INTERNAL MEDICINE

## 2023-11-17 PROCEDURE — 63710000001 ASPIRIN 81 MG TABLET DELAYED-RELEASE: Performed by: INTERNAL MEDICINE

## 2023-11-17 RX ADMIN — LISINOPRIL 10 MG: 10 TABLET ORAL at 09:02

## 2023-11-17 RX ADMIN — SODIUM CHLORIDE 2000 MG: 900 INJECTION INTRAVENOUS at 00:15

## 2023-11-17 RX ADMIN — ASPIRIN 81 MG: 81 TABLET, COATED ORAL at 09:02

## 2023-11-17 RX ADMIN — METOPROLOL TARTRATE 25 MG: 25 TABLET, FILM COATED ORAL at 09:02

## 2023-11-17 RX ADMIN — Medication 10 ML: at 09:02

## 2023-11-17 NOTE — DISCHARGE INSTR - APPOINTMENTS
You have an appointment with Joan Vasquez PCP on November 27, 2023 @ 10:20 AM.   Call them if you have any questions. Phone: [phone] [address] [id number]

## 2023-11-17 NOTE — PROGRESS NOTES
"  [facility]  PROGRESS NOTE    Date of Admission: 11/16/2023  Date of Service: 11/17/23    Primary Care Physician: Joan Vasquez, APRN    Chief Complaint: follow up heart block    Problem List:   AV block, 2nd degree    Bradycardia    Heart block    Heart block AV second degree    AV block, Mobitz II      Subjective      Status post pacemaker implantation yesterday.  Patient doing well with no acute events overnight.  No chest pain or shortness of breath.  Intermittently paced on telemetry.  Ready to go home.      Objective   Vitals: /65 (BP Location: Right arm, Patient Position: Lying)   Pulse 66   Temp 98.2 °F (36.8 °C) (Oral)   Resp 16   Ht 154.9 cm (61\")   Wt 92.3 kg (203 lb 7.8 oz)   SpO2 93%   BMI 38.45 kg/m²     Physical Exam:  GENERAL: in no acute distress.   HEENT:  no jugular venous distention  HEART: Regular rhythm, normal rate, and no murmurs, gallops, or rubs.   LUNGS: Clear to auscultation bilaterally. No wheezing, rales or rhonchi.  EXTREMITIES: No edema noted.     Results:  Results from last 7 days   Lab Units 11/16/23  0652   WBC 10*3/mm3 7.53   HEMOGLOBIN g/dL 13.2   HEMATOCRIT % 43.0   PLATELETS 10*3/mm3 251     Results from last 7 days   Lab Units 11/16/23  0652   SODIUM mmol/L 142   POTASSIUM mmol/L 4.2   CHLORIDE mmol/L 104   CO2 mmol/L 29.0   BUN mg/dL 19   CREATININE mg/dL 0.88   GLUCOSE mg/dL 114*      Lab Results   Component Value Date    CHOL 134 09/16/2021    TRIG 90 09/16/2021    HDL 50 09/16/2021    LDL 67 09/16/2021    AST 16 10/10/2023    ALT 23 10/10/2023                     Results from last 7 days   Lab Units 11/17/23  0445   PROTIME Seconds 13.2   INR  0.99                 Intake/Output Summary (Last 24 hours) at 11/17/2023 0747  Last data filed at 11/16/2023 1030  Gross per 24 hour   Intake 525 ml   Output --   Net 525 ml       I personally reviewed the patient's EKG/Telemetry data    Radiology Data:   Results for orders " placed during the hospital encounter of 09/06/23    Adult Transthoracic Echo Complete W/ Cont if Necessary Per Protocol    Interpretation Summary    Left ventricular systolic function is normal. Left ventricular ejection fraction appears to be 61 - 65%.    Left ventricular diastolic function is consistent with (grade Ia w/high LAP) impaired relaxation.    Estimated right ventricular systolic pressure from tricuspid regurgitation is mildly elevated (35-45 mmHg).        Current Medications:  aspirin, 81 mg, Oral, Daily  atorvastatin, 10 mg, Oral, Nightly  lisinopril, 10 mg, Oral, Daily  metoprolol tartrate, 25 mg, Oral, Q12H  pantoprazole, 40 mg, Oral, Nightly  sodium chloride, 10 mL, Intravenous, Q12H           Assessment  Mobitz type I and type II AV block, s/p DC PPM   Tachybrady syndrome  Hypertension   Dyslipidemia    Plan  We will discharge the patient home.  She will need wound check in 1 week followed by 3-month follow-up for device check.  Metoprolol added this admission for tachycardia.  Precautions discussed with the patient and handout given.     Shital Nolan PA-C

## 2023-11-17 NOTE — PLAN OF CARE
Goal Outcome Evaluation:  Plan of Care Reviewed With: patient         Alert and oriented x 4. A paced most of night on tele with BBB noted at times. Left arm in sling. Left chest wall pacemaker incision site intact. Denies chest pain. Is a standby assist to bathroom and patient uses her cane also. Is on room air. Expected to be discharged today.

## 2023-11-17 NOTE — DISCHARGE INSTRUCTIONS
Follow instructions on handout given regarding pacemaker precautions.   Remain off work x2 weeks. May return to work 12/4/2023.

## 2023-11-19 LAB
QT INTERVAL: 480 MS
QTC INTERVAL: 495 MS

## 2023-11-21 ENCOUNTER — OFFICE VISIT (OUTPATIENT)
Dept: CARDIOLOGY | Facility: HOSPITAL | Age: 73
End: 2023-11-21
Payer: MEDICARE

## 2023-11-21 VITALS
OXYGEN SATURATION: 96 % | BODY MASS INDEX: 38.91 KG/M2 | DIASTOLIC BLOOD PRESSURE: 82 MMHG | RESPIRATION RATE: 16 BRPM | SYSTOLIC BLOOD PRESSURE: 138 MMHG | WEIGHT: 206.1 LBS | HEIGHT: 61 IN | HEART RATE: 71 BPM | TEMPERATURE: 97.7 F

## 2023-11-21 DIAGNOSIS — I10 PRIMARY HYPERTENSION: ICD-10-CM

## 2023-11-21 DIAGNOSIS — E78.5 DYSLIPIDEMIA: ICD-10-CM

## 2023-11-21 DIAGNOSIS — I44.1 AV BLOCK, MOBITZ II: Primary | ICD-10-CM

## 2023-11-21 DIAGNOSIS — R06.09 DOE (DYSPNEA ON EXERTION): ICD-10-CM

## 2023-11-21 NOTE — PROGRESS NOTES
"Chief Complaint  Follow-up (Mobitz type 2)    Subjective    History of Present Illness {  Problem List  Visit  Diagnosis   Encounters  Notes  Medications  Labs  Result Review Imaging  Media :23}       History of Present Illness   72-year-old female presents the office today for ongoing evaluation of her near syncope, orthostatic hypotension and symptomatic bradycardia.  She was admitted to [facility] 9/6/2023 to 9/7/2023 with complaints of near syncope. Wore a cardiac event that showed mobitz type 2 and underwent pacemaker implantation per Dr Rosales on 11/16/23. She reports that she is feeling great and currently denies cp, dyspnea, dizziness, presyncope, edema. Notes that she is able to walk around without dyspnea.  She has a past medical history of hypertension, hyperlipidemia, HFrEF with recovered EF, COPD, obstructive sleep apnea, seizure disorder, anxiety, depression, former tobacco abuse and obesity.  .  TTE performed 9/7/2023 showed an EF of 61 to 65% with grade 1 diastolic dysfunction, RVSP 35 to 45 mmHg.       Objective     Vital Signs:   Vitals:    11/21/23 1501 11/21/23 1530   BP: 168/69 138/82   BP Location: Left arm Left arm   Patient Position: Sitting Sitting   Cuff Size: Adult    Pulse: 71    Resp: 16    Temp: 97.7 °F (36.5 °C)    TempSrc: Temporal    SpO2: 96%    Weight: 93.5 kg (206 lb 1.6 oz)    Height: 154.9 cm (61\")      Body mass index is 38.94 kg/m².  Physical Exam  Vitals and nursing note reviewed.   Constitutional:       Appearance: Normal appearance.   HENT:      Head: Normocephalic.   Eyes:      Pupils: Pupils are equal, round, and reactive to light.   Cardiovascular:      Rate and Rhythm: Normal rate and regular rhythm.      Pulses: Normal pulses.      Heart sounds: Normal heart sounds. No murmur heard.  Pulmonary:      Effort: Pulmonary effort is normal.      Breath sounds: Normal breath sounds.   Abdominal:      General: Bowel sounds are normal.      Palpations: " Abdomen is soft.   Musculoskeletal:         General: Normal range of motion.      Cervical back: Normal range of motion.      Right lower leg: No edema.      Left lower leg: No edema.   Skin:     General: Skin is warm and dry.      Capillary Refill: Capillary refill takes less than 2 seconds.   Neurological:      Mental Status: She is alert and oriented to person, place, and time.   Psychiatric:         Mood and Affect: Mood normal.         Thought Content: Thought content normal.      Dressing intact to left infraclavicular space         Result Review  Data Reviewed:{ Labs  Result Review  Imaging  Med Tab  Media :23}   Vent. Rate :  92 BPM     Atrial Rate :  92 BPM     P-R Int : 148 ms          QRS Dur : 136 ms      QT Int : 382 ms       P-R-T Axes :  55 -31  87 degrees     QTc Int : 472 ms     Sinus rhythm with marked sinus arrhythmia  Left axis deviation  Left bundle branch block  Abnormal ECG  When compared with ECG of 06-SEP-2023 16:30,  Vent. rate has increased BY  46 BPM  QT has lengthened  Adult Transthoracic Echo Complete W/ Cont if Necessary Per Protocol (09/07/2023 10:04)         Lab Results   Component Value Date    GLUCOSE 114 (H) 11/16/2023    CALCIUM 9.9 11/16/2023     11/16/2023    K 4.2 11/16/2023    CO2 29.0 11/16/2023     11/16/2023    BUN 19 11/16/2023    CREATININE 0.88 11/16/2023    EGFR 69.5 11/16/2023    BCR 21.6 11/16/2023    ANIONGAP 9.0 11/16/2023     Lab Results   Component Value Date    WBC 7.53 11/16/2023    HGB 13.2 11/16/2023    HCT 43.0 11/16/2023    MCV 90.1 11/16/2023     11/16/2023     Adult Transthoracic Echo Complete W/ Cont if Necessary Per Protocol (09/07/2023 10:04)      Assessment and Plan {CC Problem List  Visit Diagnosis  ROS  Review (Popup)  Health Maintenance  Quality  BestPractice  Medications  SmartSets  SnapShot Encounters  Media :23}   1. Av block mobitz type II  S/p pacemaker implantation per Dr Rosales on 11/16/23  Reviewed lifting  precautions and ROM of left arm   2. Primary hypertension  Currently stable on lisinopril 10 mg daily  Continue to monitor closely    3. Dyslipidemia  Stable on Lipitor  4. GANNON   Improved                  Follow Up {Instructions Charge Capture  Follow-up Communications :23}   Return if symptoms worsen or fail to improve.    Patient was given instructions and counseling regarding her condition or for health maintenance advice. Please see specific information pulled into the AVS if appropriate.  Patient was instructed to call the [facility] with any questions, concerns, or worsening symptoms.

## 2023-11-27 ENCOUNTER — OFFICE VISIT (OUTPATIENT)
Dept: CARDIOLOGY | Facility: CLINIC | Age: 73
End: 2023-11-27
Payer: MEDICARE

## 2023-11-27 DIAGNOSIS — I45.9 HEART BLOCK: Primary | ICD-10-CM

## 2023-11-27 NOTE — PROGRESS NOTES
2023    Julieta Frey [facility], : 1950      Fever: No    Temperature if indicated:     Wound Location: Left Infraclavicular    Dressing Removed: Removed by MA/RN      Old Dressing Appearance:  Clean, dry    Wound Appearance: Redness []                  Drainage []                  Culture obtained []        Color: Clear     Consistency:      Amount: none         Gloves used, wound cleansed with sterile 4x4 and peroxide [x]       MD notified []     MD orders:     Antibiotic started []      If checked, type     Other:       Appointment for follow-up scheduled for 3 months post procedure [x]    Future Appointments   Date Time Provider Department Center   2023  1:00 PM CHAIR 1 COR [facility] INF COR   1/15/2024 10:30 AM Princess Warner APRN [facility]   2024  1:15 PM Eric Rosales MD [facility] TED TED           Vanessa Dubon MA, 23      MD Signature:______________________________ Completed By/Date:

## 2023-12-15 ENCOUNTER — TRANSCRIBE ORDERS (OUTPATIENT)
Dept: ONCOLOGY | Facility: HOSPITAL | Age: 73
End: 2023-12-15
Payer: MEDICARE

## 2023-12-18 ENCOUNTER — INFUSION (OUTPATIENT)
Dept: ONCOLOGY | Facility: HOSPITAL | Age: 73
End: 2023-12-18
Payer: MEDICARE

## 2023-12-18 VITALS
DIASTOLIC BLOOD PRESSURE: 72 MMHG | SYSTOLIC BLOOD PRESSURE: 179 MMHG | BODY MASS INDEX: 38.32 KG/M2 | HEART RATE: 81 BPM | RESPIRATION RATE: 18 BRPM | WEIGHT: 202.8 LBS | TEMPERATURE: 97.7 F | OXYGEN SATURATION: 93 %

## 2023-12-18 DIAGNOSIS — M81.0 OSTEOPOROSIS, UNSPECIFIED OSTEOPOROSIS TYPE, UNSPECIFIED PATHOLOGICAL FRACTURE PRESENCE: Primary | ICD-10-CM

## 2023-12-18 PROCEDURE — 25010000002 DENOSUMAB 60 MG/ML SOLUTION PREFILLED SYRINGE: Performed by: NURSE PRACTITIONER

## 2023-12-18 PROCEDURE — 96372 THER/PROPH/DIAG INJ SC/IM: CPT

## 2023-12-18 RX ADMIN — DENOSUMAB 60 MG: 60 INJECTION SUBCUTANEOUS at 13:17

## 2024-01-22 ENCOUNTER — OFFICE VISIT (OUTPATIENT)
Dept: PULMONOLOGY | Facility: CLINIC | Age: 74
End: 2024-01-22
Payer: MEDICARE

## 2024-01-22 VITALS
WEIGHT: 198 LBS | HEART RATE: 102 BPM | SYSTOLIC BLOOD PRESSURE: 138 MMHG | DIASTOLIC BLOOD PRESSURE: 88 MMHG | BODY MASS INDEX: 38.87 KG/M2 | HEIGHT: 60 IN | TEMPERATURE: 98.3 F | OXYGEN SATURATION: 95 %

## 2024-01-22 DIAGNOSIS — G47.33 OSA (OBSTRUCTIVE SLEEP APNEA): Primary | ICD-10-CM

## 2024-01-22 DIAGNOSIS — J44.9 CHRONIC OBSTRUCTIVE PULMONARY DISEASE, UNSPECIFIED COPD TYPE: ICD-10-CM

## 2024-01-22 DIAGNOSIS — E66.01 CLASS 2 SEVERE OBESITY DUE TO EXCESS CALORIES WITH SERIOUS COMORBIDITY AND BODY MASS INDEX (BMI) OF 36.0 TO 36.9 IN ADULT: ICD-10-CM

## 2024-01-22 PROCEDURE — 1160F RVW MEDS BY RX/DR IN RCRD: CPT | Performed by: NURSE PRACTITIONER

## 2024-01-22 PROCEDURE — 3075F SYST BP GE 130 - 139MM HG: CPT | Performed by: NURSE PRACTITIONER

## 2024-01-22 PROCEDURE — 99214 OFFICE O/P EST MOD 30 MIN: CPT | Performed by: NURSE PRACTITIONER

## 2024-01-22 PROCEDURE — 1159F MED LIST DOCD IN RCRD: CPT | Performed by: NURSE PRACTITIONER

## 2024-01-22 PROCEDURE — 3079F DIAST BP 80-89 MM HG: CPT | Performed by: NURSE PRACTITIONER

## 2024-01-22 RX ORDER — FLUTICASONE FUROATE, UMECLIDINIUM BROMIDE AND VILANTEROL TRIFENATATE 100; 62.5; 25 UG/1; UG/1; UG/1
1 POWDER RESPIRATORY (INHALATION)
Qty: 60 EACH | Refills: 5 | Status: SHIPPED | OUTPATIENT
Start: 2024-01-22

## 2024-01-22 RX ORDER — ALBUTEROL SULFATE 90 UG/1
2 AEROSOL, METERED RESPIRATORY (INHALATION) EVERY 4 HOURS PRN
Qty: 18 G | Refills: 5 | Status: SHIPPED | OUTPATIENT
Start: 2024-01-22

## 2024-01-22 NOTE — PROGRESS NOTES
"Chief Complaint  Sleep Apnea    Subjective        Julieta Tk Holt presents to [facility]  History of Present Illness    Ms. Holt is a 73 year old female with a medical history significant for hypertension, arthritis, COPD, hypertension, hyperlipidemia, seizures, and sleep apnea.    She presents today for follow up on sleep apnea.  She states that she has been using her autopap nightly.  She reports that she also had a pacemaker placed since her last visit and reports that this has improved her breathing significantly.  She continues to take Trelegy once daily and albuterol as needed.        Objective   Vital Signs:  /88   Pulse 102   Temp 98.3 °F (36.8 °C)   Ht 152.4 cm (60\")   Wt 89.8 kg (198 lb)   SpO2 95%   BMI 38.67 kg/m²   Estimated body mass index is 38.67 kg/m² as calculated from the following:    Height as of this encounter: 152.4 cm (60\").    Weight as of this encounter: 89.8 kg (198 lb).               Physical Exam     GENERAL APPEARANCE: Well developed, well nourished, alert and cooperative, and appears to be in no acute distress.    HEAD: normocephalic. Atraumatic.    EYES: PERRL, EOMI. Vision is grossly intact.    THROAT: Oral cavity and pharynx normal. No inflammation, swelling, exudate, or lesions.     NECK: Neck supple.  No thyromegaly.    CARDIAC: Normal S1 and S2. No S3, S4 or murmurs. Rhythm is regular.     RESPIRATORY:Bilateral air entry positive. Bilateral diminished breath sounds. No wheezing, crackles or rhonchi noted.    GI: Positive bowel sounds. Soft, nondistended, nontender.     MUSCULOSKELETAL: No significant deformity or joint abnormality. No edema. Peripheral pulses intact. No varicosities.    NEUROLOGICAL: Strength and sensation symmetric and intact throughout.     PSYCHIATRIC: The mental examination revealed the patient was oriented to person, place, and time.     Result Review :    The following " data was reviewed by: MARY Chu on 01/22/2024:  Common labs          9/7/2023    02:06 10/10/2023    10:02 11/16/2023    06:52   Common Labs   Glucose 105  120  114    BUN 17  16  19    Creatinine 0.86  0.89  0.88    Sodium 143  144  142    Potassium 4.4  4.0  4.2    Chloride 107  106  104    Calcium 9.1  9.6  9.9    Albumin 3.6  4.1     Total Bilirubin 0.3  0.4     Alkaline Phosphatase 102  92     AST (SGOT) 17  16     ALT (SGPT) 33  23     WBC 8.78  8.80  7.53    Hemoglobin 10.9  11.6  13.2    Hematocrit 36.4  37.7  43.0    Platelets 218  223  251                   Assessment and Plan     Diagnoses and all orders for this visit:    1. CHRISTIAN (obstructive sleep apnea) (Primary)    2. Chronic obstructive pulmonary disease, unspecified COPD type    3. Class 2 severe obesity due to excess calories with serious comorbidity and body mass index (BMI) of 36.0 to 36.9 in adult    Other orders  -     Fluticasone-Umeclidin-Vilant (Trelegy Ellipta) 100-62.5-25 MCG/ACT inhaler; Inhale 1 puff Daily.  Dispense: 60 each; Refill: 5  -     albuterol sulfate  (90 Base) MCG/ACT inhaler; Inhale 2 puffs Every 4 (Four) Hours As Needed for Wheezing.  Dispense: 18 g; Refill: 5        Moderate sleep apnea.  She is compliant with use of autopap nightly.     Patient was educated on positive airway pressure treatment.  As per CMS guidelines, more than 4 hours on 70% of observed nights is considered adherence. Patient was strongly encouraged to use CPAP as much as possible during sleep as more CPAP use is equal to more benefit. Use of heated humidification in positive airway pressure treatment to improve the adherence to the device.  In case of claustrophobia, we will provide the patient cognitive behavioral therapy and desensitization. Oral appliances use will be discussed with the patient in case of mild to moderate sleep apnea or if the patient with severe disease fail positive airway pressure treatment.       The  patient was extensively educated on the consequences of untreated obstructive sleep apnea namely cardiovascular/metabolic disorder, neurocognitive deficit, daytime sleepiness, motor vehicle accidents, depression, mood disorders and reduced quality of life.  At the end of conversation, the patient voices understanding of the disease process and treatment modality.  Patient also understands the risk of untreated obstructive sleep apnea and benefit benefits of the treatment.    Counseling time was greater than 10 minutes.      Continue albuterol as needed.    Continue Trelegy once daily.  Provided her with samples.         Follow Up     Return in about 6 months (around 7/22/2024).  Patient was given instructions and counseling regarding her condition or for health maintenance advice. Please see specific information pulled into the AVS if appropriate.

## 2024-02-16 ENCOUNTER — OFFICE VISIT (OUTPATIENT)
Dept: CARDIOLOGY | Facility: CLINIC | Age: 74
End: 2024-02-16
Payer: MEDICARE

## 2024-02-16 VITALS
HEIGHT: 61 IN | DIASTOLIC BLOOD PRESSURE: 64 MMHG | WEIGHT: 214 LBS | OXYGEN SATURATION: 95 % | HEART RATE: 80 BPM | BODY MASS INDEX: 40.4 KG/M2 | SYSTOLIC BLOOD PRESSURE: 128 MMHG

## 2024-02-16 DIAGNOSIS — I45.9 HEART BLOCK: Primary | ICD-10-CM

## 2024-02-16 DIAGNOSIS — R00.1 BRADYCARDIA: ICD-10-CM

## 2024-02-16 DIAGNOSIS — E78.5 DYSLIPIDEMIA: ICD-10-CM

## 2024-02-16 DIAGNOSIS — R55 SYNCOPE, UNSPECIFIED SYNCOPE TYPE: ICD-10-CM

## 2024-02-16 DIAGNOSIS — I10 PRIMARY HYPERTENSION: ICD-10-CM

## 2024-02-16 PROCEDURE — 1159F MED LIST DOCD IN RCRD: CPT | Performed by: INTERNAL MEDICINE

## 2024-02-16 PROCEDURE — 3074F SYST BP LT 130 MM HG: CPT | Performed by: INTERNAL MEDICINE

## 2024-02-16 PROCEDURE — 3078F DIAST BP <80 MM HG: CPT | Performed by: INTERNAL MEDICINE

## 2024-02-16 PROCEDURE — 99214 OFFICE O/P EST MOD 30 MIN: CPT | Performed by: INTERNAL MEDICINE

## 2024-02-16 PROCEDURE — 1160F RVW MEDS BY RX/DR IN RCRD: CPT | Performed by: INTERNAL MEDICINE

## 2024-02-16 RX ORDER — HYDROXYZINE HYDROCHLORIDE 10 MG/1
10-20 TABLET, FILM COATED ORAL
COMMUNITY
Start: 2024-02-05

## 2024-02-16 RX ORDER — FAMOTIDINE 40 MG/1
TABLET, FILM COATED ORAL
COMMUNITY
Start: 2024-02-05

## 2024-02-16 RX ORDER — DOXYCYCLINE HYCLATE 100 MG/1
1 CAPSULE ORAL EVERY 12 HOURS SCHEDULED
COMMUNITY
Start: 2024-02-08

## 2024-02-16 NOTE — PROGRESS NOTES
[facility]    Encounter Date: 2024    Patient ID: Julieta Holt is a 73 y.o. female.  : 1950     PCP: Joan Vasquez APRN       Chief Complaint: Heart block      PROBLEM LIST:  Syncope  Carotid duplex 2021: No evidence of hemodynamically significant plaque or stenosis within the carotid system  30-day M cot 10/2023: Second-degree type I and type II AV block, no pauses > 2.5 seconds, no significant bradycardia. patient is symptomatic  Pacemaker implant, 2023: Dr. Rosales.  Orthostatic hypotension  HFrEF with recovered EF  Echo 11/10/2017: EF 46-50%  MPS 11/10/2017: Small to medium size infarct in the septal wall with no significant ischemia noted, EF 59%  [facility] reportedly normal, per patient. Will request.  MPS 6/10/2022: Normal myocardial perfusion study with no evidence of ischemia.   Echo 2023: EF 61-65%, grade 1 diastolic dysfunction  History of hypertension  Bradycardia  Dyslipidemia  COPD  CHRISTIAN  History of tobacco use    History of Present Illness  Patient presents today for a hospital follow-up with a history of syncope and cardiac risk factors. Since last visit, patient has been doing well overall from a cardiovascular standpoint has not had any recurrence of significant dizziness or syncope.  Feels that after pacemaker implant her problem has pretty much resolved.. She notes occasional chest sharp chest pain which is random and not related to activities or exertion.  This is brief and resolved spontaneously.   Patient recently had bronchitis. She denies anginal type chest tightness, shortness of breath, orthopnea, palpitations, edema, dizziness, and syncope.     Allergies   Allergen Reactions    Erythromycin        bruise         Current Outpatient Medications:     albuterol sulfate  (90 Base) MCG/ACT inhaler, Inhale 2 puffs Every 4 (Four) Hours As Needed for Wheezing., Disp: 18 g, Rfl: 5    aspirin 81 MG EC  tablet, Take 1 tablet by mouth Daily., Disp: , Rfl:     atorvastatin (LIPITOR) 10 MG tablet, Take 1 tablet by mouth Every Night., Disp: , Rfl:     Biotin 1 MG capsule, Take 1 tablet by mouth Daily., Disp: , Rfl:     busPIRone (BUSPAR) 10 MG tablet, Take 1 tablet by mouth See Admin Instructions. Take one tablet by mouth every morning and two tablets in the evening., Disp: , Rfl:     Calcium Carbonate Antacid (CALCIUM CARBONATE PO), Take 1 tablet by mouth Daily., Disp: , Rfl:     denosumab (Prolia) 60 MG/ML solution prefilled syringe syringe, Inject 1 mL under the skin into the appropriate area as directed Every 6 (Six) Months., Disp: , Rfl:     doxycycline (VIBRAMYCIN) 100 MG capsule, Take 1 capsule by mouth Every 12 (Twelve) Hours., Disp: , Rfl:     famotidine (PEPCID) 40 MG tablet, TAKE 1 TABLET BY MOUTH ONCE DAILY AT NIGHT AT BEDTIME FOR STOMACH, Disp: , Rfl:     fexofenadine (Allegra Allergy) 180 MG tablet, Take 1 tablet by mouth Daily., Disp: 30 tablet, Rfl: 5    Fluticasone-Umeclidin-Vilant (Trelegy Ellipta) 100-62.5-25 MCG/ACT inhaler, Inhale 1 puff Daily., Disp: 60 each, Rfl: 5    hydrOXYzine (ATARAX) 10 MG tablet, Take 1-2 tablets by mouth every night at bedtime., Disp: , Rfl:     ipratropium-albuterol (DUO-NEB) 0.5-2.5 mg/3 ml nebulizer, Take 3 mL by nebulization 4 (Four) Times a Day As Needed for Wheezing., Disp: 360 mL, Rfl: 3    lisinopril (PRINIVIL,ZESTRIL) 10 MG tablet, Take 1 tablet by mouth Daily., Disp: , Rfl:     metoprolol tartrate (LOPRESSOR) 25 MG tablet, Take 1 tablet by mouth Every 12 (Twelve) Hours., Disp: 180 tablet, Rfl: 0    nitroglycerin (NITROSTAT) 0.4 MG SL tablet, Place 1 tablet under the tongue Every 5 (Five) Minutes As Needed for Chest Pain. Take no more than 3 doses in 15 minutes., Disp: , Rfl:     pantoprazole (Protonix) 40 MG EC tablet, Take 1 tablet by mouth Daily., Disp: 30 tablet, Rfl: 0    Potassium 99 MG tablet, Take 1 tablet by mouth Daily., Disp: , Rfl:     vitamin D3 125  "MCG (5000 UT) capsule capsule, Take 1 capsule by mouth Daily., Disp: , Rfl:     The following portions of the patient's history were reviewed and updated as appropriate: allergies, current medications, past family history, past medical history, past social history, past surgical history and problem list.    ROS  Review of Systems   14 point ROS negative except for that listed in the HPI.         Objective:     /64 (BP Location: Left arm, Patient Position: Sitting)   Pulse 80   Ht 154.9 cm (61\")   Wt 97.1 kg (214 lb)   SpO2 95%   BMI 40.43 kg/m²      Physical Exam  Constitutional: Patient appears well-developed and well-nourished.   HENT: HEENT exam unremarkable.   Neck: Neck supple. No JVD present. No carotid bruits.   Cardiovascular: Normal rate, regular rhythm and normal heart sounds. No murmur heard.   2+ symmetric pulses.   Pulmonary/Chest: Breath sounds normal. Does not exhibit tenderness.   Abdominal: Abdomen benign.   Musculoskeletal: Does not exhibit edema.   Neurological: Neurological exam unremarkable.   Vitals reviewed.    Data Review:   Lab Results   Component Value Date    GLUCOSE 114 (H) 11/16/2023    BUN 19 11/16/2023    CREATININE 0.88 11/16/2023    EGFR 69.5 11/16/2023    BCR 21.6 11/16/2023     11/16/2023    K 4.2 11/16/2023    CO2 29.0 11/16/2023    CALCIUM 9.9 11/16/2023    ALBUMIN 4.1 10/10/2023    AST 16 10/10/2023    ALT 23 10/10/2023     Lab Results   Component Value Date    WBC 7.53 11/16/2023    RBC 4.77 11/16/2023    HGB 13.2 11/16/2023    HCT 43.0 11/16/2023    MCV 90.1 11/16/2023     11/16/2023     Lab Results   Component Value Date    TSH 0.959 09/06/2023     Lab Results   Component Value Date    HGBA1C 5.70 (H) 09/06/2023        Procedures   DEVICE INTERROGATION:  /2016, PPM: RA pacing 25%, RV pacing 2%. P wave is 3.7 mV with a threshold of 1 V at 0.5 msec and an impedance of 450 ohms. R wave is >12 mV with a threshold of 0.625 V at 0.5 msec and an impedance of " 580 ohms. Battery voltage is 11.2 years. Events: 2 AMS episodes, most recent 1/24/24 4 seconds.    Advance Care Planning   ACP discussion was declined by the patient. Patient does not have an advance directive, declines further assistance.           Assessment:      Diagnosis Plan   1. Heart block, status post PPM Stable and asymptomatic.  Normal pacemaker function continue on aspirin 81 mg for antiplatelet therapy. Continue on nitroglycerin 0.4 mg PRN for chest pain.      2. Syncope No recent episodes.  Resolved after pacemaker implant      3. Bradycardia, status post PPM Stable normal device function      4. Primary hypertension  Well controlled. Continue on lisinopril 10 mg daily for hypertension. Continue on metoprolol 25 mg BID for rate control and hypertension.       5. Dyslipidemia  No labs for review. Continue on atorvastatin 10 mg daily for hyperlipidemia.         Plan:   Stable cardiac status.  Normal device function, no recurrence of dizziness or syncope.  Atypical musculoskeletal type chest pain, previous myocardial perfusion study was negative for ischemia but  Continue current medications.   FU in 6 MO, sooner as needed.  Thank you for allowing us to participate in the care of your patient.     Scribed for Eric Rosales MD by Natasha Aguiar. 2/16/2024 14:29 EST    I, Eric Rosales MD, personally performed the services described in this documentation as scribed by the above named individual in my presence, and it is both accurate and complete.  2/19/2024  16:25 EST      Please note that portions of this note may have been completed with a voice recognition program. Efforts were made to edit the dictations, but occasionally words are mistranscribed.

## 2024-06-19 ENCOUNTER — INFUSION (OUTPATIENT)
Dept: ONCOLOGY | Facility: HOSPITAL | Age: 74
End: 2024-06-19
Payer: MEDICARE

## 2024-06-19 VITALS
SYSTOLIC BLOOD PRESSURE: 123 MMHG | RESPIRATION RATE: 18 BRPM | DIASTOLIC BLOOD PRESSURE: 62 MMHG | HEART RATE: 70 BPM | BODY MASS INDEX: 38.22 KG/M2 | OXYGEN SATURATION: 98 % | TEMPERATURE: 97.8 F | WEIGHT: 202.3 LBS

## 2024-06-19 DIAGNOSIS — M81.0 OSTEOPOROSIS, UNSPECIFIED OSTEOPOROSIS TYPE, UNSPECIFIED PATHOLOGICAL FRACTURE PRESENCE: Primary | ICD-10-CM

## 2024-06-19 LAB — CALCIUM SPEC-SCNC: 10.2 MG/DL (ref 8.6–10.5)

## 2024-06-19 PROCEDURE — G0463 HOSPITAL OUTPT CLINIC VISIT: HCPCS

## 2024-06-19 PROCEDURE — 82310 ASSAY OF CALCIUM: CPT | Performed by: NURSE PRACTITIONER

## 2024-06-19 NOTE — PROGRESS NOTES
"Patient arrived to clinic for scheduled prolia injection. Labs obtained, vital signs documented and nursing assessment completed. Patient verbalizes that she had all of her upper teeth extracted and partials set on 6/4/24. Patient reports that she currently has \"canker sores\" on her upper and lower gums from poor fitting dentures and is uncertain if her gums are fully healed or not. Ordering provider, MARY Oquendo notified and telephone order given to hold today's prolia and reschedule injection for 7/8/24. Patient educated to call [facility] oncology/infusion clinic prior to next appointment if her mouth is not fully healed by that time. Patient verbalizes understanding.    "

## 2024-07-08 ENCOUNTER — INFUSION (OUTPATIENT)
Dept: ONCOLOGY | Facility: HOSPITAL | Age: 74
End: 2024-07-08
Payer: MEDICARE

## 2024-07-08 VITALS
HEART RATE: 70 BPM | BODY MASS INDEX: 38.66 KG/M2 | RESPIRATION RATE: 18 BRPM | DIASTOLIC BLOOD PRESSURE: 64 MMHG | WEIGHT: 204.6 LBS | TEMPERATURE: 98.4 F | OXYGEN SATURATION: 92 % | SYSTOLIC BLOOD PRESSURE: 145 MMHG

## 2024-07-08 DIAGNOSIS — M81.0 OSTEOPOROSIS, UNSPECIFIED OSTEOPOROSIS TYPE, UNSPECIFIED PATHOLOGICAL FRACTURE PRESENCE: Primary | ICD-10-CM

## 2024-07-08 PROCEDURE — 25010000002 DENOSUMAB 60 MG/ML SOLUTION PREFILLED SYRINGE: Performed by: NURSE PRACTITIONER

## 2024-07-08 PROCEDURE — 96372 THER/PROPH/DIAG INJ SC/IM: CPT

## 2024-07-08 RX ADMIN — DENOSUMAB 60 MG: 60 INJECTION SUBCUTANEOUS at 15:23

## 2024-07-09 ENCOUNTER — TRANSCRIBE ORDERS (OUTPATIENT)
Dept: PHYSICAL THERAPY | Facility: CLINIC | Age: 74
End: 2024-07-09
Payer: MEDICARE

## 2024-07-09 DIAGNOSIS — M25.511 RIGHT SHOULDER PAIN, UNSPECIFIED CHRONICITY: Primary | ICD-10-CM

## 2024-07-16 ENCOUNTER — HOSPITAL ENCOUNTER (OUTPATIENT)
Dept: MAMMOGRAPHY | Facility: HOSPITAL | Age: 74
Discharge: HOME | End: 2024-07-16
Payer: MEDICARE

## 2024-07-16 ENCOUNTER — HOSPITAL ENCOUNTER (OUTPATIENT)
Dept: ULTRASOUND IMAGING | Facility: HOSPITAL | Age: 74
Discharge: HOME | End: 2024-07-16
Payer: MEDICARE

## 2024-07-16 DIAGNOSIS — R92.8 ABNORMAL MAMMOGRAM: ICD-10-CM

## 2024-07-16 PROCEDURE — 77066 DX MAMMO INCL CAD BI: CPT | Performed by: RADIOLOGY

## 2024-07-16 PROCEDURE — 77066 DX MAMMO INCL CAD BI: CPT

## 2024-07-16 PROCEDURE — 76642 ULTRASOUND BREAST LIMITED: CPT | Performed by: RADIOLOGY

## 2024-07-16 PROCEDURE — G0279 TOMOSYNTHESIS, MAMMO: HCPCS | Performed by: RADIOLOGY

## 2024-07-16 PROCEDURE — G0279 TOMOSYNTHESIS, MAMMO: HCPCS

## 2024-07-16 PROCEDURE — 76642 ULTRASOUND BREAST LIMITED: CPT

## 2024-07-22 ENCOUNTER — TREATMENT (OUTPATIENT)
Dept: PHYSICAL THERAPY | Facility: CLINIC | Age: 74
End: 2024-07-22
Payer: MEDICARE

## 2024-07-22 DIAGNOSIS — M54.2 PAIN, NECK: ICD-10-CM

## 2024-07-22 DIAGNOSIS — M25.611 DECREASED ROM OF RIGHT SHOULDER: ICD-10-CM

## 2024-07-22 DIAGNOSIS — M25.511 ACUTE PAIN OF RIGHT SHOULDER: Primary | ICD-10-CM

## 2024-07-22 PROCEDURE — 97110 THERAPEUTIC EXERCISES: CPT | Performed by: PHYSICAL THERAPIST

## 2024-07-22 PROCEDURE — 97140 MANUAL THERAPY 1/> REGIONS: CPT | Performed by: PHYSICAL THERAPIST

## 2024-07-22 PROCEDURE — 97162 PT EVAL MOD COMPLEX 30 MIN: CPT | Performed by: PHYSICAL THERAPIST

## 2024-07-22 NOTE — PROGRESS NOTES
Physical Therapy Initial Evaluation and Plan of Care    Patient: Julieta Frey [facility]   : 1950  Diagnosis/ICD-10 Code:  Acute pain of right shoulder [M25.511]  Referring practitioner: MARY Harvey  Date of Initial Visit: 2024  Today's Date: 2024  Patient seen for 1 session         Visit Diagnoses:    ICD-10-CM ICD-9-CM   1. Acute pain of right shoulder  M25.511 719.41   2. Decreased ROM of right shoulder  M25.611 719.51   3. Pain, neck  M54.2 723.1         Subjective Questionnaire: QuickDASH: 59%      Subjective Evaluation    History of Present Illness  Onset date: 3 weeks.  Mechanism of injury: The patient started suffering from right shoulder pain around three weeks ago with no known cause.  The pain has progressed in the past three weeks and sometimes radiates into both elbows.  She has also experienced increased right hand numbness.  The patient was evaluated by MD Vasquez and was advised to initiate therapy for treatment.      Precautions and Work Restrictions: pacemakerQuality of life: good    Pain  Current pain ratin  At best pain ratin  At worst pain rating: 10  Location: right suprspinatus  Quality: sharp  Relieving factors: medications  Aggravating factors: lifting, movement, repetitive movement, outstretched reach and overhead activity    Hand dominance: right    Patient Goals  Patient goals for therapy: decreased pain, increased motion, increased strength, independence with ADLs/IADLs and return to sport/leisure activities             Objective          Postural Observations    Additional Postural Observation Details  Slumped posture with fwd head    Palpation     Additional Palpation Details  C7 tenderness; tenderness of right UT, right supraspinatus, right deltoid, right biceps and triceps    Neurological Testing     Sensation   Cervical/Thoracic   Left   Intact: light touch    Right   Diminished: light touch    Active Range of Motion   Cervical/Thoracic  Spine   Cervical    Flexion: WFL  Extension: WFL  Left rotation: WFL  Right rotation: WFL  Left Shoulder   Flexion: 120 degrees   Abduction: 100 degrees     Right Shoulder   Flexion: 80 degrees   Abduction: 70 degrees     Strength/Myotome Testing     Left Shoulder     Planes of Motion   Flexion: 4-   Abduction: 4-   External rotation at 0°: 4   Internal rotation at 0°: 4     Right Shoulder     Planes of Motion   Flexion: 4   Abduction: 4   External rotation at 0°: 4   Internal rotation at 0°: 4     Left Elbow   Flexion: 4  Extension: 4    Right Elbow   Flexion: 4  Extension: 4    Left Wrist/Hand      (2nd hand position)     Trial 1: 55 lbs    Trial 2: 55 lbs    Trial 3: 55 lbs    Average: 55 lbs    Right Wrist/Hand      (2nd hand position)     Trial 1: 45 lbs    Trial 2: 55 lbs    Trial 3: 40 lbs    Average: 46.67 lbs    Tests   Cervical   Positive repeated flexion.    Left   Positive Spurling's sign.     Right   Positive Spurling's sign.     Left Shoulder   Positive empty can.     Right Shoulder   Positive empty can.     Additional Tests Details  Bilateral spurling's causes bilateral elbow pain  Most tested deferred due to limited ROM and pain          Assessment & Plan       Assessment  Impairments: abnormal muscle firing, abnormal muscle tone, abnormal or restricted ROM, activity intolerance, impaired physical strength, lacks appropriate home exercise program and pain with function   Functional limitations: carrying objects, lifting, pulling, pushing, uncomfortable because of pain, stooping, reaching behind back, reaching overhead and unable to perform repetitive tasks   Assessment details: Pt is a 74 y/o female referred to therapy with neck and right shoulder/arm pain.  Pt noted to have limited right shoulder ROM, poor postural awareness and a bilateral positive cervical Spurling's test.  Therapy will follow for improved shoulder mobility and decreased pain in order to improve ADLs.  Prognosis:  good    Goals  Plan Goals: STG 6 weeks    1 Pt will be instructed in a HEP.  2 Pt will improve her Quick Dash to less than 40%.  3 Pt will improve her shoulder flexion to 120 degrees.    LTG 12 weeks    1 Pt will report pain no greater than 3/10 with overhead reaching.  2 Pt will improve bilateral shoulder flexion to 150 degrees.  3 Pt will report a 50% decrease in radicular symptoms.    Plan  Therapy options: will be seen for skilled therapy services  Planned modality interventions: cryotherapy and thermotherapy (hydrocollator packs)  Planned therapy interventions: ADL retraining, body mechanics training, flexibility, functional ROM exercises, IADL retraining, joint mobilization, home exercise program, manual therapy, neuromuscular re-education, postural training, soft tissue mobilization, spinal/joint mobilization, strengthening, stretching and therapeutic activities  Frequency: 2x week  Duration in weeks: 12  Treatment plan discussed with: patient  Plan details: Will follow for optimal gains.  Moderate Evaluation  86834    Re-evaluation   17059      Therapeutic exercise  80222  Therapeutic activity    33736  Neuromuscular re-education   85187  Manual therapy   90673    Moist heat/cryotherapy 44469               Timed:         Manual Therapy:    10     mins  18003;     Therapeutic Exercise:    14     mins  96317;     Neuromuscular Linda:        mins  19668;    Therapeutic Activity:          mins  56322;     Gait Training:           mins  06098;     Ultrasound:          mins  86583;    Ionto                                   mins   64609  Self Care                            mins   80520  Canalith Repos         mins 69360      Un-Timed:  Electrical Stimulation:         mins  78919 ( );  Dry Needling          mins self-pay  Traction          mins 68013  Low Eval          Mins  31811  Mod Eval     30     Mins  11674  High Eval                            Mins  44306        Timed Treatment:   24   mins   Total  Treatment:     54   mins          PT: Paul Reno, PT     License Number: [id number]  Electronically signed by Paul Reno, PT, 07/22/24, 1:09 PM EDT    Certification Period: 7/22/2024 thru 10/19/2024  I certify that the therapy services are furnished while this patient is under my care.  The services outlined above are required by this patient, and will be reviewed every 90 days.         Physician Signature:__________________________________________________    PHYSICIAN: Joan Vasquez, MARY  NPI: [id number]                                      DATE:      Please sign and return via fax to [email or web address] . Thank you, [facility].

## 2024-07-25 ENCOUNTER — TREATMENT (OUTPATIENT)
Dept: PHYSICAL THERAPY | Facility: CLINIC | Age: 74
End: 2024-07-25
Payer: MEDICARE

## 2024-07-25 DIAGNOSIS — M25.511 ACUTE PAIN OF RIGHT SHOULDER: Primary | ICD-10-CM

## 2024-07-25 DIAGNOSIS — M25.611 DECREASED ROM OF RIGHT SHOULDER: ICD-10-CM

## 2024-07-25 DIAGNOSIS — M54.2 PAIN, NECK: ICD-10-CM

## 2024-07-25 NOTE — PROGRESS NOTES
Physical Therapy Daily Treatment Note      Patient: Julieta Frey [facility]   : 1950  Referring practitioner: MARY Harvey  Date of Initial Visit: Type: THERAPY  Noted: 2024  Today's Date: 2024  Patient seen for 2 sessions       Visit Diagnoses:    ICD-10-CM ICD-9-CM   1. Acute pain of right shoulder  M25.511 719.41   2. Decreased ROM of right shoulder  M25.611 719.51   3. Pain, neck  M54.2 723.1       Subjective Evaluation    History of Present Illness    Subjective comment: Pt has 5/10 pain today.       Objective   See Exercise, Manual, and Modality Logs for complete treatment.       Assessment & Plan       Assessment  Assessment details: Tx today consisted of exercises for improved postural stability and shoulder mobility followed by stm to neck and ended with ice for decreased pain.  Pt had difficulty with chin tucks and chin tucks deferred for cervical extensions and isometrics adde with good response.  Pt reported 3/10 post pain.    Plan  Plan details: Will follow progressing mobility and decreased pain.          Timed:         Manual Therapy:    12     mins  31487;     Therapeutic Exercise:    14     mins  34885;     Neuromuscular Linda:        mins  58268;    Therapeutic Activity:          mins  05751;     Gait Training:           mins  11399;     Ultrasound:          mins  63522;    Ionto                                   mins   60348  Self Care                            mins   27233  Canalith Repos         mins 24747      Un-Timed:  Electrical Stimulation:         mins  26196 ( );  Dry Needling          mins self-pay  Traction          mins 60354      Timed Treatment:   26   mins   Total Treatment:     32   mins(6min ice)    Paul Reno PT  [place] License: [id number]      Electronically signed by Paul Reno PT, 24, 7:37 AM EDT

## 2024-07-29 ENCOUNTER — OFFICE VISIT (OUTPATIENT)
Dept: PULMONOLOGY | Facility: CLINIC | Age: 74
End: 2024-07-29
Payer: MEDICARE

## 2024-07-29 VITALS
OXYGEN SATURATION: 97 % | HEART RATE: 78 BPM | BODY MASS INDEX: 38.51 KG/M2 | HEIGHT: 61 IN | TEMPERATURE: 97.3 F | WEIGHT: 204 LBS | SYSTOLIC BLOOD PRESSURE: 168 MMHG | DIASTOLIC BLOOD PRESSURE: 82 MMHG

## 2024-07-29 DIAGNOSIS — E66.01 CLASS 2 SEVERE OBESITY DUE TO EXCESS CALORIES WITH SERIOUS COMORBIDITY AND BODY MASS INDEX (BMI) OF 38.0 TO 38.9 IN ADULT: ICD-10-CM

## 2024-07-29 DIAGNOSIS — G47.33 OSA (OBSTRUCTIVE SLEEP APNEA): Primary | ICD-10-CM

## 2024-07-29 DIAGNOSIS — J44.9 CHRONIC OBSTRUCTIVE PULMONARY DISEASE, UNSPECIFIED COPD TYPE: ICD-10-CM

## 2024-07-29 PROCEDURE — 3077F SYST BP >= 140 MM HG: CPT | Performed by: NURSE PRACTITIONER

## 2024-07-29 PROCEDURE — 1160F RVW MEDS BY RX/DR IN RCRD: CPT | Performed by: NURSE PRACTITIONER

## 2024-07-29 PROCEDURE — G2211 COMPLEX E/M VISIT ADD ON: HCPCS | Performed by: NURSE PRACTITIONER

## 2024-07-29 PROCEDURE — 1159F MED LIST DOCD IN RCRD: CPT | Performed by: NURSE PRACTITIONER

## 2024-07-29 PROCEDURE — 99214 OFFICE O/P EST MOD 30 MIN: CPT | Performed by: NURSE PRACTITIONER

## 2024-07-29 PROCEDURE — 3079F DIAST BP 80-89 MM HG: CPT | Performed by: NURSE PRACTITIONER

## 2024-07-29 RX ORDER — DICLOFENAC SODIUM 75 MG/1
75 TABLET, DELAYED RELEASE ORAL 2 TIMES DAILY PRN
COMMUNITY
Start: 2024-07-09

## 2024-07-29 RX ORDER — ALBUTEROL SULFATE 90 UG/1
2 AEROSOL, METERED RESPIRATORY (INHALATION) EVERY 4 HOURS PRN
Qty: 18 G | Refills: 5 | Status: SHIPPED | OUTPATIENT
Start: 2024-07-29

## 2024-07-29 RX ORDER — DULOXETIN HYDROCHLORIDE 30 MG/1
30 CAPSULE, DELAYED RELEASE ORAL NIGHTLY
COMMUNITY
Start: 2024-07-09

## 2024-07-29 NOTE — PROGRESS NOTES
"Chief Complaint  Sleep Apnea    Subjective        Julieta Tk Holt presents to [facility] PULMONARY & CRITICAL CARE MEDICINE  History of Present Illness    Ms. Holt is a 73 year old female with a medical history significant for hypertension, arthritis, COPD, depression, hypertension, seizures and sleep apnea.      She presents today for follow up on Sleep apnea and COPD.  She reports that she has been doing well since her last visit.  She states that her breathing has been at baseline.  She is currently taking Trelegy once daily.  She is also using albuterol as needed.  She is compliant with use of autopap at night.        Objective   Vital Signs:  /82   Pulse 78   Temp 97.3 °F (36.3 °C) (Temporal)   Ht 154.9 cm (61\")   Wt 92.5 kg (204 lb)   SpO2 97%   BMI 38.55 kg/m²   Estimated body mass index is 38.55 kg/m² as calculated from the following:    Height as of this encounter: 154.9 cm (61\").    Weight as of this encounter: 92.5 kg (204 lb).               Physical Exam     GENERAL APPEARANCE: Well developed, well nourished, alert and cooperative, and appears to be in no acute distress.    HEAD: normocephalic. Atraumatic.    EYES: PERRL, EOMI. Vision is grossly intact.    THROAT: Oral cavity and pharynx normal. No inflammation, swelling, exudate, or lesions.     NECK: Neck supple.  No thyromegaly.    CARDIAC: Normal S1 and S2. No S3, S4 or murmurs. Rhythm is regular.     RESPIRATORY:Bilateral air entry positive. Bilateral diminished breath sounds. No wheezing, crackles or rhonchi noted.    GI: Positive bowel sounds. Soft, nondistended, nontender.     MUSCULOSKELETAL: No significant deformity or joint abnormality. No edema. Peripheral pulses intact. No varicosities.    NEUROLOGICAL: Strength and sensation symmetric and intact throughout.     PSYCHIATRIC: The mental examination revealed the patient was oriented to person, place, and time.     Result Review :    The " following data was reviewed by: MARY Chu on 07/29/2024:  Common labs          10/10/2023    10:02 11/16/2023    06:52 6/19/2024    13:22   Common Labs   Glucose 120  114     BUN 16  19     Creatinine 0.89  0.88     Sodium 144  142     Potassium 4.0  4.2     Chloride 106  104     Calcium 9.6  9.9  10.2    Albumin 4.1      Total Bilirubin 0.4      Alkaline Phosphatase 92      AST (SGOT) 16      ALT (SGPT) 23      WBC 8.80  7.53     Hemoglobin 11.6  13.2     Hematocrit 37.7  43.0     Platelets 223  251                    Assessment and Plan     Diagnoses and all orders for this visit:    1. CHRISTIAN (obstructive sleep apnea) (Primary)    2. Chronic obstructive pulmonary disease, unspecified COPD type    3. Class 2 severe obesity due to excess calories with serious comorbidity and body mass index (BMI) of 38.0 to 38.9 in adult    Other orders  -     albuterol sulfate  (90 Base) MCG/ACT inhaler; Inhale 2 puffs Every 4 (Four) Hours As Needed for Wheezing.  Dispense: 18 g; Refill: 5        She is compliant with use of autopap nightly for moderate sleep apnea.     Patient was educated on positive airway pressure treatment.  As per CMS guidelines, more than 4 hours on 70% of observed nights is considered adherence. Patient was strongly encouraged to use CPAP as much as possible during sleep as more CPAP use is equal to more benefit. Use of heated humidification in positive airway pressure treatment to improve the adherence to the device.  In case of claustrophobia, we will provide the patient cognitive behavioral therapy and desensitization. Oral appliances use will be discussed with the patient in case of mild to moderate sleep apnea or if the patient with severe disease fail positive airway pressure treatment.       The patient was extensively educated on the consequences of untreated obstructive sleep apnea namely cardiovascular/metabolic disorder, neurocognitive deficit, daytime sleepiness, motor  vehicle accidents, depression, mood disorders and reduced quality of life.  At the end of conversation, the patient voices understanding of the disease process and treatment modality.  Patient also understands the risk of untreated obstructive sleep apnea and benefit benefits of the treatment.    Counseling time was greater than 10 minutes.      Continue Trelegy once daily. Provided her with samples.  Continue albuterol as needed.  Sent in refills.      We dicussed diet and exercise.    Follow Up     Return in about 6 months (around 1/29/2025).  Patient was given instructions and counseling regarding her condition or for health maintenance advice. Please see specific information pulled into the AVS if appropriate.

## 2024-07-30 ENCOUNTER — TREATMENT (OUTPATIENT)
Dept: PHYSICAL THERAPY | Facility: CLINIC | Age: 74
End: 2024-07-30
Payer: MEDICARE

## 2024-07-30 DIAGNOSIS — M25.611 DECREASED ROM OF RIGHT SHOULDER: ICD-10-CM

## 2024-07-30 DIAGNOSIS — M54.2 PAIN, NECK: ICD-10-CM

## 2024-07-30 DIAGNOSIS — M25.511 ACUTE PAIN OF RIGHT SHOULDER: Primary | ICD-10-CM

## 2024-07-30 NOTE — PROGRESS NOTES
Physical Therapy Daily Treatment Note      Patient: Julieta Frey [facility]   : 1950  Referring practitioner: MARY Harvey  Date of Initial Visit: Type: THERAPY  Noted: 2024  Today's Date: 2024  Patient seen for 3 sessions       Visit Diagnoses:    ICD-10-CM ICD-9-CM   1. Acute pain of right shoulder  M25.511 719.41   2. Decreased ROM of right shoulder  M25.611 719.51   3. Pain, neck  M54.2 723.1       Subjective Evaluation    History of Present Illness    Subjective comment: Pt has 5/10 right elbow pain.       Objective   See Exercise, Manual, and Modality Logs for complete treatment.       Assessment & Plan       Assessment  Assessment details: Tx today consisted of exercises for improved shoulder ROM and cervical mobility and stability; followed by stm to cervical region and ended with ice for decreased pain.  Pt responded well to added mid rows with rtb.  Pt reported 4/10 post pain.    Plan  Plan details: Will follow progressing core stability and decreased pain.          Timed:         Manual Therapy:    12     mins  48394;     Therapeutic Exercise:    17     mins  05770;     Neuromuscular Linda:        mins  33092;    Therapeutic Activity:          mins  39796;     Gait Training:           mins  02102;     Ultrasound:          mins  18477;    Ionto                                   mins   51967  Self Care                            mins   87038  Canalith Repos         mins 98501      Un-Timed:  Electrical Stimulation:         mins  11368 ( );  Dry Needling          mins self-pay  Traction          mins 71189      Timed Treatment:   29   mins   Total Treatment:     29   mins    Paul Reno PT  [place] License: [id number]      Electronically signed by Paul Reno PT, 24, 8:00 AM EDT

## 2024-08-01 ENCOUNTER — TREATMENT (OUTPATIENT)
Dept: PHYSICAL THERAPY | Facility: CLINIC | Age: 74
End: 2024-08-01
Payer: MEDICARE

## 2024-08-01 DIAGNOSIS — M25.511 ACUTE PAIN OF RIGHT SHOULDER: Primary | ICD-10-CM

## 2024-08-01 DIAGNOSIS — M54.2 PAIN, NECK: ICD-10-CM

## 2024-08-01 DIAGNOSIS — M25.611 DECREASED ROM OF RIGHT SHOULDER: ICD-10-CM

## 2024-08-01 NOTE — PROGRESS NOTES
Physical Therapy Daily Treatment Note      Patient: Julieta Frey [facility]   : 1950  Referring practitioner: MARY Harvey  Date of Initial Visit: Type: THERAPY  Noted: 2024  Today's Date: 2024  Patient seen for 4 sessions       Visit Diagnoses:    ICD-10-CM ICD-9-CM   1. Acute pain of right shoulder  M25.511 719.41   2. Decreased ROM of right shoulder  M25.611 719.51   3. Pain, neck  M54.2 723.1       Subjective Evaluation    History of Present Illness    Subjective comment: Pt has 7/10 pain today.       Objective   See Exercise, Manual, and Modality Logs for complete treatment.       Assessment & Plan       Assessment  Assessment details: Tx today consisted of exercises for improved shld mobility and centralization of symptoms with added postural TB postural exercises; followed by stm to left UT and ended with ice for decreased pain.  Pt demonstrated good effort with decreased pain to 4/10 post tx.    Plan  Plan details: Will follow progressing postural stability and decreased pain.          Timed:         Manual Therapy:    12     mins  76936;     Therapeutic Exercise:    18     mins  15187;     Neuromuscular Linda:        mins  60082;    Therapeutic Activity:          mins  91219;     Gait Training:           mins  77243;     Ultrasound:          mins  29155;    Ionto                                   mins   43193  Self Care                            mins   90056  Canalith Repos         mins 35383      Un-Timed:  Electrical Stimulation:         mins  62110 ( );  Dry Needling          mins self-pay  Traction          mins 72443      Timed Treatment:   30   mins   Total Treatment:     36   mins(6min ice)    Paul Reno PT  [place] License: [id number]      Electronically signed by Paul Reno PT, 24, 7:57 AM EDT

## 2024-08-06 ENCOUNTER — TREATMENT (OUTPATIENT)
Dept: PHYSICAL THERAPY | Facility: CLINIC | Age: 74
End: 2024-08-06
Payer: MEDICARE

## 2024-08-06 DIAGNOSIS — M25.511 ACUTE PAIN OF RIGHT SHOULDER: Primary | ICD-10-CM

## 2024-08-06 DIAGNOSIS — M54.2 PAIN, NECK: ICD-10-CM

## 2024-08-06 DIAGNOSIS — M25.611 DECREASED ROM OF RIGHT SHOULDER: ICD-10-CM

## 2024-08-06 PROCEDURE — 97140 MANUAL THERAPY 1/> REGIONS: CPT | Performed by: PHYSICAL THERAPIST

## 2024-08-06 PROCEDURE — 97110 THERAPEUTIC EXERCISES: CPT | Performed by: PHYSICAL THERAPIST

## 2024-08-06 NOTE — PROGRESS NOTES
Physical Therapy Daily Treatment Note      Patient: Julieta Frey [facility]   : 1950  Referring practitioner: MARY Harvey  Date of Initial Visit: Type: THERAPY  Noted: 2024  Today's Date: 2024  Patient seen for 5 sessions       Visit Diagnoses:    ICD-10-CM ICD-9-CM   1. Acute pain of right shoulder  M25.511 719.41   2. Decreased ROM of right shoulder  M25.611 719.51   3. Pain, neck  M54.2 723.1       Subjective Evaluation    History of Present Illness    Subjective comment: Pt has 6/10 pain today.       Objective   See Exercise, Manual, and Modality Logs for complete treatment.       Assessment & Plan       Assessment  Assessment details: Tx today consisted of exercises for improved postural stability and cervical mobility and stability; followed by stm to cervical and UT region and ended with ice for decreased inflammation.  Exercises modified to reduce pain.  Pt reported 6/10 post pain.      Plan  Plan details: Will follow progressing core stability and decreased pain.          Timed:         Manual Therapy:    12     mins  72276;     Therapeutic Exercise:    17     mins  54395;     Neuromuscular Linda:        mins  27027;    Therapeutic Activity:          mins  20169;     Gait Training:           mins  40347;     Ultrasound:          mins  12734;    Ionto                                   mins   95316  Self Care                            mins   26882  Canalith Repos         mins 60337      Un-Timed:  Electrical Stimulation:         mins  45046 ( );  Dry Needling          mins self-pay  Traction          mins 03978      Timed Treatment:   29   mins   Total Treatment:     35   mins(6min)    Paul Reno PT  [place] License: [id number]      Electronically signed by Paul Reno PT, 24, 7:43 AM EDT

## 2024-08-08 ENCOUNTER — TREATMENT (OUTPATIENT)
Dept: PHYSICAL THERAPY | Facility: CLINIC | Age: 74
End: 2024-08-08
Payer: MEDICARE

## 2024-08-08 DIAGNOSIS — M54.2 PAIN, NECK: ICD-10-CM

## 2024-08-08 DIAGNOSIS — M25.511 ACUTE PAIN OF RIGHT SHOULDER: Primary | ICD-10-CM

## 2024-08-08 DIAGNOSIS — M25.611 DECREASED ROM OF RIGHT SHOULDER: ICD-10-CM

## 2024-08-08 NOTE — PROGRESS NOTES
Physical Therapy Daily Treatment Note      Patient: Julieta Frey [facility]   : 1950  Referring practitioner: MARY Harvey  Date of Initial Visit: Type: THERAPY  Noted: 2024  Today's Date: 2024  Patient seen for 6 sessions       Visit Diagnoses:    ICD-10-CM ICD-9-CM   1. Acute pain of right shoulder  M25.511 719.41   2. Decreased ROM of right shoulder  M25.611 719.51   3. Pain, neck  M54.2 723.1       Subjective Evaluation    History of Present Illness    Subjective comment: Pt reports having increased back pain today.  Pt has 7/10 neck pain today.       Objective   See Exercise, Manual, and Modality Logs for complete treatment.       Assessment & Plan       Assessment  Assessment details: Tx today consisted of exercises for improved postural stability and cervical mobility and stability with increases to 25 reps; followed by stm to cervical and UT region and ended with ice for decreased inflammation.  Pt cont to require cues for postural strengthening.    Plan  Plan details: Will follow progressing core stability and decreased pain.          Timed:         Manual Therapy:   12      mins  47835;     Therapeutic Exercise:   18      mins  05903;     Neuromuscular Linda:        mins  70932;    Therapeutic Activity:          mins  81184;     Gait Training:           mins  80724;     Ultrasound:          mins  41329;    Ionto                                   mins   03075  Self Care                            mins   26911  Canalith Repos         mins 39212      Un-Timed:  Electrical Stimulation:         mins  44125 ( );  Dry Needling          mins self-pay  Traction          mins 18941      Timed Treatment:   30   mins   Total Treatment:     36   mins(6min ice)    Paul Reno PT  [place] License: [id number]      Electronically signed by Paul Reno PT, 24, 7:40 AM EDT

## 2024-08-13 ENCOUNTER — TREATMENT (OUTPATIENT)
Dept: PHYSICAL THERAPY | Facility: CLINIC | Age: 74
End: 2024-08-13
Payer: MEDICARE

## 2024-08-13 DIAGNOSIS — M25.511 ACUTE PAIN OF RIGHT SHOULDER: Primary | ICD-10-CM

## 2024-08-13 DIAGNOSIS — M25.611 DECREASED ROM OF RIGHT SHOULDER: ICD-10-CM

## 2024-08-13 DIAGNOSIS — M54.2 PAIN, NECK: ICD-10-CM

## 2024-08-13 PROCEDURE — 97110 THERAPEUTIC EXERCISES: CPT | Performed by: PHYSICAL THERAPIST

## 2024-08-13 PROCEDURE — 97140 MANUAL THERAPY 1/> REGIONS: CPT | Performed by: PHYSICAL THERAPIST

## 2024-08-13 NOTE — PROGRESS NOTES
Physical Therapy Daily Treatment Note      Patient: Julieta Frey [facility]   : 1950  Referring practitioner: MARY Harvey  Date of Initial Visit: Type: THERAPY  Noted: 2024  Today's Date: 2024  Patient seen for 7 sessions       Visit Diagnoses:    ICD-10-CM ICD-9-CM   1. Acute pain of right shoulder  M25.511 719.41   2. Decreased ROM of right shoulder  M25.611 719.51   3. Pain, neck  M54.2 723.1       Subjective Evaluation    History of Present Illness    Subjective comment: Pt has 6/10 pain today.       Objective   See Exercise, Manual, and Modality Logs for complete treatment.       Assessment & Plan       Assessment  Assessment details: Tx today consisted of exercises for improved postural stability and cervical mobility and stability; followed by stm to cervical and UT region and ended with ice for decreased inflammation.  Pt reported no distress during session and reported decreased pain following session.    Plan  Plan details: Will follow progressing core stability and decreased pain.          Timed:         Manual Therapy:    12     mins  36879;     Therapeutic Exercise:    17     mins  94353;     Neuromuscular Linda:        mins  67517;    Therapeutic Activity:          mins  63270;     Gait Training:           mins  92270;     Ultrasound:          mins  60479;    Ionto                                   mins   58668  Self Care                            mins   54680  Canalith Repos         mins 68898      Un-Timed:  Electrical Stimulation:         mins  72695 ( );  Dry Needling          mins self-pay  Traction          mins 41307      Timed Treatment:   29   mins   Total Treatment:     35   mins(6min ice)    Paul Reno PT  [place] License: [id number]      Electronically signed by Paul Reno PT, 24, 7:48 AM EDT

## 2024-08-15 ENCOUNTER — TREATMENT (OUTPATIENT)
Dept: PHYSICAL THERAPY | Facility: CLINIC | Age: 74
End: 2024-08-15
Payer: MEDICARE

## 2024-08-15 DIAGNOSIS — M25.611 DECREASED ROM OF RIGHT SHOULDER: ICD-10-CM

## 2024-08-15 DIAGNOSIS — M25.511 ACUTE PAIN OF RIGHT SHOULDER: Primary | ICD-10-CM

## 2024-08-15 DIAGNOSIS — M54.2 PAIN, NECK: ICD-10-CM

## 2024-08-15 NOTE — PROGRESS NOTES
Physical Therapy Daily Treatment Note      Patient: Julieta Frey [facility]   : 1950  Referring practitioner: MARY Harvey  Date of Initial Visit: Type: THERAPY  Noted: 2024  Today's Date: 8/15/2024  Patient seen for 8 sessions       Visit Diagnoses:    ICD-10-CM ICD-9-CM   1. Acute pain of right shoulder  M25.511 719.41   2. Decreased ROM of right shoulder  M25.611 719.51   3. Pain, neck  M54.2 723.1       Subjective Evaluation    History of Present Illness    Subjective comment: Pt has 6/10 pain today.       Objective   See Exercise, Manual, and Modality Logs for complete treatment.       Assessment & Plan       Assessment  Assessment details: Tx today consisted of exercises for improved postural stability and cervical mobility and stability; followed by stm to cervical and UT region and ended with ice for decreased inflammation.  Pt responded well to added reps of 30 today and reported similar post pain.    Plan  Plan details: Will follow progressing core stability and decreased pain.          Timed:         Manual Therapy:    12     mins  01047;     Therapeutic Exercise:    17     mins  10358;     Neuromuscular Linda:        mins  88522;    Therapeutic Activity:          mins  09107;     Gait Training:           mins  87126;     Ultrasound:          mins  37874;    Ionto                                   mins   32099  Self Care                            mins   79251  Canalith Repos         mins 86146      Un-Timed:  Electrical Stimulation:         mins  35766 ( );  Dry Needling          mins self-pay  Traction          mins 22863      Timed Treatment:   29   mins   Total Treatment:     35   mins(6min ice)    Paul Reno PT  [place] License: [id number]      Electronically signed by Paul Reno PT, 08/15/24, 7:51 AM EDT

## 2024-08-20 ENCOUNTER — TREATMENT (OUTPATIENT)
Dept: PHYSICAL THERAPY | Facility: CLINIC | Age: 74
End: 2024-08-20
Payer: MEDICARE

## 2024-08-20 DIAGNOSIS — M54.2 PAIN, NECK: ICD-10-CM

## 2024-08-20 DIAGNOSIS — M25.511 ACUTE PAIN OF RIGHT SHOULDER: Primary | ICD-10-CM

## 2024-08-20 DIAGNOSIS — M25.611 DECREASED ROM OF RIGHT SHOULDER: ICD-10-CM

## 2024-08-20 PROCEDURE — 97110 THERAPEUTIC EXERCISES: CPT | Performed by: PHYSICAL THERAPIST

## 2024-08-20 PROCEDURE — 97140 MANUAL THERAPY 1/> REGIONS: CPT | Performed by: PHYSICAL THERAPIST

## 2024-08-20 NOTE — PROGRESS NOTES
Physical Therapy Daily Treatment Note      Patient: Julieta Frey [facility]   : 1950  Referring practitioner: MARY Harvey  Date of Initial Visit: Type: THERAPY  Noted: 2024  Today's Date: 2024  Patient seen for 9 sessions       Visit Diagnoses:    ICD-10-CM ICD-9-CM   1. Acute pain of right shoulder  M25.511 719.41   2. Decreased ROM of right shoulder  M25.611 719.51   3. Pain, neck  M54.2 723.1       Subjective Evaluation    History of Present Illness    Subjective comment: Pt reports 4-5/10 neck and right shoulder pain prior to today's session.Pain  Current pain ratin         Objective   See Exercise, Manual, and Modality Logs for complete treatment.       Assessment & Plan       Assessment  Assessment details: Today's treatment session was initiated with STM to the cervical and right UT region, addressing muscle tightness, with tenderness reported. Therapeutic exercises were then performed for improved cervical ROM and scapular stability. Rest breaks were provided as needed. Today's session concluded with cryotherapy to bilateral UT, addressing pain and inflammation, with no skin irritation observed. The patient reported 7-8/10 cervical pain following today's treatment session.    Plan  Plan details: Progress as indicated to achieve max function.        Timed:         Manual Therapy:    10     mins  43058;     Therapeutic Exercise:    22     mins  54874;     Neuromuscular Linda:        mins  82265;    Therapeutic Activity:          mins  81156;     Gait Training:           mins  15588;     Ultrasound:          mins  66439;    Ionto                                   mins   68372  Self Care                            mins   87096      Un-Timed:  Electrical Stimulation:         mins  99206 ( );  Dry Needling          mins self-pay  Traction          mins 23010  Canalith Repos         mins 86129    Timed Treatment:   32   mins   Total Treatment:     42   mins (6 minutes  cryotherapy)    Ashley Claudene Dalton, PT  [place] License: [id number]

## 2024-08-22 ENCOUNTER — TRANSCRIBE ORDERS (OUTPATIENT)
Dept: PHYSICAL THERAPY | Facility: HOSPITAL | Age: 74
End: 2024-08-22
Payer: MEDICARE

## 2024-08-22 ENCOUNTER — TREATMENT (OUTPATIENT)
Dept: PHYSICAL THERAPY | Facility: CLINIC | Age: 74
End: 2024-08-22
Payer: MEDICARE

## 2024-08-22 DIAGNOSIS — M54.2 PAIN, NECK: ICD-10-CM

## 2024-08-22 DIAGNOSIS — M25.611 DECREASED ROM OF RIGHT SHOULDER: ICD-10-CM

## 2024-08-22 DIAGNOSIS — M25.511 ACUTE PAIN OF RIGHT SHOULDER: Primary | ICD-10-CM

## 2024-08-22 DIAGNOSIS — M25.511 RIGHT SHOULDER PAIN, UNSPECIFIED CHRONICITY: Primary | ICD-10-CM

## 2024-08-22 PROCEDURE — 97110 THERAPEUTIC EXERCISES: CPT | Performed by: PHYSICAL THERAPIST

## 2024-08-22 PROCEDURE — 97140 MANUAL THERAPY 1/> REGIONS: CPT | Performed by: PHYSICAL THERAPIST

## 2024-08-22 NOTE — PROGRESS NOTES
Physical Therapy Progress Note  Patient: Julieta Holt   : 1950  Diagnosis/ICD-10 Code:  Acute pain of right shoulder [M25.511]  Referring practitioner: MARY Harvey  Date of Initial Visit: Type: THERAPY  Noted: 2024  Today's Date: 2024  Patient seen for 10 sessions         Visit Diagnoses:    ICD-10-CM ICD-9-CM   1. Acute pain of right shoulder  M25.511 719.41   2. Decreased ROM of right shoulder  M25.611 719.51   3. Pain, neck  M54.2 723.1         Julieta Holt reports: She continues to experience numbness/tingling in the UEs, stating that she has not noticed any improvement with radicular pain.  She currently reports 6/10 pain and feels like her pain usually does not get any better than that.  She does feel like her movement has improved some.  Subjective Questionnaire: QuickDASH: 65.91% impaired  Clinical Progress: improved  Home Program Compliance: Yes      Subjective Evaluation    Pain  Current pain ratin  At best pain ratin  At worst pain ratin           Objective          Active Range of Motion   Cervical/Thoracic Spine   Cervical    Flexion: WFL  Extension: WFL  Left rotation: WFL  Right rotation: WFL  Left Shoulder   Flexion: 120 degrees   Abduction: 100 degrees     Right Shoulder   Flexion: 105 degrees   Abduction: 80 degrees     Strength/Myotome Testing     Left Shoulder     Planes of Motion   Flexion: 4   Abduction: 4-   External rotation at 0°: 4   Internal rotation at 0°: 4     Right Shoulder     Planes of Motion   Flexion: 4   Abduction: 4   External rotation at 0°: 4   Internal rotation at 0°: 4     Left Elbow   Flexion: 4+  Extension: 4+    Right Elbow   Flexion: 4+  Extension: 4+          Assessment & Plan       Assessment  Impairments: abnormal muscle firing, abnormal muscle tone, abnormal or restricted ROM, activity intolerance, impaired physical strength, lacks appropriate home exercise program and pain with function    Functional limitations: carrying objects, lifting, pulling, pushing, uncomfortable because of pain, stooping, reaching behind back, reaching overhead and unable to perform repetitive tasks   Assessment details: Patient has been attending physical therapy for treatment of neck and right shoulder pain.  Patient has shown improvements in right shoulder ROM, but continues to have ROM deficits; patient currently displays 105 degrees right shoulder flexion and 80 degrees right shoulder abduction.  Bilateral UE strength has also improved since start of care.  Patient continues to report elevated pian levels, noting 6/10 pain at best and 8/10 pain at worst.  She will continue to benefit from skilled PT, so that she can achieve her maximum level of function.  Prognosis: good    Goals  Plan Goals: STG 6 weeks  1 Pt will be instructed in a HEP.  Met  2 Pt will improve her Quick Dash to less than 40%.  Ongoing  3 Pt will improve her shoulder flexion to 120 degrees.  Ongoing, progressing    LTG 12 weeks  1 Pt will report pain no greater than 3/10 with overhead reaching.  Ongoing, progressing-up to 7/10  2 Pt will improve bilateral shoulder flexion to 150 degrees.  Ongoing, progressing  3 Pt will report a 50% decrease in radicular symptoms.  Ongoing-no improvements reported    Plan  Therapy options: will be seen for skilled therapy services  Planned modality interventions: cryotherapy and thermotherapy (hydrocollator packs)  Planned therapy interventions: ADL retraining, body mechanics training, flexibility, functional ROM exercises, IADL retraining, joint mobilization, home exercise program, manual therapy, neuromuscular re-education, postural training, soft tissue mobilization, spinal/joint mobilization, strengthening, stretching and therapeutic activities  Frequency: 2x week  Duration in weeks: 8  Treatment plan discussed with: patient  Plan details: Re-evaluation   14193    Therapeutic exercise  83057  Therapeutic activity     72497  Neuromuscular re-education   53988  Manual therapy   29914    Moist heat/cryotherapy 67159        Recommendations: Continue as planned  Timeframe: 2 months  Prognosis to achieve goals: good      Timed:         Manual Therapy:    15     mins  20613;     Therapeutic Exercise:    25     mins  42211;     Neuromuscular Linda:        mins  50374;    Therapeutic Activity:          mins  90021;     Gait Training:           mins  39989;     Ultrasound:          mins  59233;    Ionto                                   mins   53941  Self Care                            mins   79574    Un-Timed:  Electrical Stimulation:         mins  45964 ( );  Dry Needling          mins self-pay  Traction          mins 27500  Re-Eval                               mins  80218  Canalith Repos         mins 58066  Ice-6 min    Timed Treatment:   40   mins   Total Treatment:     46   mins          PT: Elle Mora PT     [place] License:  [id number]    Electronically signed by Elle Mora PT, 08/22/24, 8:16 AM EDT    Certification Period: 8/22/2024 thru 11/19/2024  I certify that the therapy services are furnished while this patient is under my care.  The services outlined above are required by this patient, and will be reviewed every 90 days.         Physician Signature:__________________________________________________    PHYSICIAN: Joan Vasquez APRN  NPI: [id number]                                      DATE:  :     Please sign and return via fax to [email or web address] . Thank you, [facility]

## 2024-08-27 ENCOUNTER — HOSPITAL ENCOUNTER (OUTPATIENT)
Dept: PHYSICAL THERAPY | Facility: HOSPITAL | Age: 74
Discharge: HOME | End: 2024-08-27
Admitting: NURSE PRACTITIONER
Payer: MEDICARE

## 2024-08-27 ENCOUNTER — OFFICE VISIT (OUTPATIENT)
Dept: CARDIOLOGY | Facility: CLINIC | Age: 74
End: 2024-08-27
Payer: MEDICARE

## 2024-08-27 ENCOUNTER — HOSPITAL ENCOUNTER (OUTPATIENT)
Dept: PHYSICAL THERAPY | Facility: HOSPITAL | Age: 74
Setting detail: THERAPIES SERIES
Discharge: HOME | End: 2024-08-27
Payer: MEDICARE

## 2024-08-27 VITALS
WEIGHT: 209.6 LBS | DIASTOLIC BLOOD PRESSURE: 82 MMHG | HEART RATE: 96 BPM | OXYGEN SATURATION: 94 % | SYSTOLIC BLOOD PRESSURE: 150 MMHG | HEIGHT: 61 IN | BODY MASS INDEX: 39.57 KG/M2

## 2024-08-27 DIAGNOSIS — M25.511 ACUTE PAIN OF RIGHT SHOULDER: Primary | ICD-10-CM

## 2024-08-27 DIAGNOSIS — M25.611 DECREASED ROM OF RIGHT SHOULDER: ICD-10-CM

## 2024-08-27 DIAGNOSIS — M54.2 PAIN, NECK: ICD-10-CM

## 2024-08-27 DIAGNOSIS — I10 ESSENTIAL HYPERTENSION: ICD-10-CM

## 2024-08-27 DIAGNOSIS — I45.9 HEART BLOCK: Primary | ICD-10-CM

## 2024-08-27 DIAGNOSIS — E78.5 DYSLIPIDEMIA: ICD-10-CM

## 2024-08-27 PROCEDURE — 3077F SYST BP >= 140 MM HG: CPT | Performed by: INTERNAL MEDICINE

## 2024-08-27 PROCEDURE — 97140 MANUAL THERAPY 1/> REGIONS: CPT | Performed by: PHYSICAL THERAPIST

## 2024-08-27 PROCEDURE — 93288 INTERROG EVL PM/LDLS PM IP: CPT | Performed by: INTERNAL MEDICINE

## 2024-08-27 PROCEDURE — 99214 OFFICE O/P EST MOD 30 MIN: CPT | Performed by: INTERNAL MEDICINE

## 2024-08-27 PROCEDURE — 97110 THERAPEUTIC EXERCISES: CPT | Performed by: PHYSICAL THERAPIST

## 2024-08-27 PROCEDURE — 3079F DIAST BP 80-89 MM HG: CPT | Performed by: INTERNAL MEDICINE

## 2024-08-27 RX ORDER — LISINOPRIL 20 MG/1
20 TABLET ORAL 2 TIMES DAILY
Qty: 180 TABLET | Refills: 3 | Status: SHIPPED | OUTPATIENT
Start: 2024-08-27

## 2024-08-27 RX ORDER — FLUTICASONE FUROATE, UMECLIDINIUM BROMIDE AND VILANTEROL TRIFENATATE 200; 62.5; 25 UG/1; UG/1; UG/1
POWDER RESPIRATORY (INHALATION)
COMMUNITY
Start: 2024-06-10

## 2024-08-27 NOTE — THERAPY TREATMENT NOTE
Physical Therapy Daily Treatment Note      Patient: Julieta Frey [facility]   : 1950  Referring practitioner: MARY Harvey  Date of Initial Visit: Type: THERAPY  Noted: 2024  Today's Date: 2024  Patient seen for Visit count could not be calculated. Make sure you are using a visit which is associated with an episode. sessions       Visit Diagnoses:    ICD-10-CM ICD-9-CM   1. Acute pain of right shoulder  M25.511 719.41   2. Decreased ROM of right shoulder  M25.611 719.51   3. Pain, neck  M54.2 723.1       Subjective Evaluation    History of Present Illness    Subjective comment: Pt reports 6-7/10 neck and right shoulder pain prior to today's session.       Objective   See Exercise, Manual, and Modality Logs for complete treatment.       Assessment & Plan       Assessment  Assessment details: Today's treatment session was initiated with STM to bilateral UT, addressing muscle tightness. Therapeutic exercises were performed for improved right shoulder ROM, strength, and scapular stability. Tactile and verbal cues were provided for proper form. Rest breaks were provided as needed. Today's session concluded with cryotherapy to bilateral UT, addressing inflammation, with no skin irritation observed. The patient reported 7/10 cervical and right shoulder pain following today's session.    Plan  Plan details: Progress as indicated for improved functional mobility and independence.          Timed:         Manual Therapy:    15     mins  47234;     Therapeutic Exercise:    28     mins  27857;     Neuromuscular Linda:        mins  87195;    Therapeutic Activity:          mins  18622;     Gait Training:           mins  34804;     Ultrasound:          mins  75104;    Ionto                                   mins   40487  Self Care                            mins   11427  Canalith Repos         mins 92919      Un-Timed:  Electrical Stimulation:         mins  95293 ( );  Dry Needling           mins self-pay  Traction          mins 36951      Timed Treatment:   43   mins   Total Treatment:     51   mins (8 minutes cryotherapy)    Ashley Claudene Dalton, PT  [place] License: [id number]      Electronically signed by Ashley Claudene Dalton, PT, 08/27/24, 9:33 AM EDT

## 2024-08-27 NOTE — PROGRESS NOTES
[facility]    Encounter Date: 2024    Patient ID: Julieta Holt is a 73 y.o. female.  : 1950     PCP: Joan Vasquez APRN       Chief Complaint: Heart block, Dizziness, Edema (Left leg), Shortness of Breath, and Hypertension      PROBLEM LIST:  Syncope  Carotid duplex 2021: No evidence of hemodynamically significant plaque or stenosis within the carotid system  30-day M cot 10/2023: Second-degree type I and type II AV block, no pauses > 2.5 seconds, no significant bradycardia. patient is symptomatic  Pacemaker implant, 2023: Dr. Rosales.  Orthostatic hypotension  HFrEF with recovered EF  Echo 11/10/2017: EF 46-50%  MPS 11/10/2017: Small to medium size infarct in the septal wall with no significant ischemia noted, EF 59%  LHC at STJ reportedly normal, per patient. Will request.  MPS 6/10/2022: Normal myocardial perfusion study with no evidence of ischemia.   Echo 2023: EF 61-65%, grade 1 diastolic dysfunction  History of hypertension  Bradycardia  Dyslipidemia  COPD  CHRISTIAN  History of tobacco use    History of Present Illness  Patient presents today for a follow-up with a history of heart block and cardiac risk factors. Since last visit, patient has been doing well overall from a cardiovascular standpoint. She stays busy and active. Patient denies chest pain, shortness of breath, orthopnea, palpitations, edema, dizziness, and syncope.     Allergies   Allergen Reactions    Erythromycin        bruise         Current Outpatient Medications:     albuterol sulfate  (90 Base) MCG/ACT inhaler, Inhale 2 puffs Every 4 (Four) Hours As Needed for Wheezing., Disp: 18 g, Rfl: 5    aspirin 81 MG EC tablet, Take 1 tablet by mouth Daily., Disp: , Rfl:     atorvastatin (LIPITOR) 10 MG tablet, Take 1 tablet by mouth Every Night., Disp: , Rfl:     Biotin 1 MG capsule, Take 1 tablet by mouth Daily., Disp: , Rfl:     busPIRone (BUSPAR) 10 MG  tablet, Take 1 tablet by mouth See Admin Instructions. Take one tablet by mouth every morning and two tablets in the evening., Disp: , Rfl:     Calcium Carbonate Antacid (CALCIUM CARBONATE PO), Take 1 tablet by mouth Daily., Disp: , Rfl:     denosumab (Prolia) 60 MG/ML solution prefilled syringe syringe, Inject 1 mL under the skin into the appropriate area as directed Every 6 (Six) Months., Disp: , Rfl:     DULoxetine (CYMBALTA) 30 MG capsule, Take 1 capsule by mouth Every Night., Disp: , Rfl:     famotidine (PEPCID) 40 MG tablet, TAKE 1 TABLET BY MOUTH ONCE DAILY AT NIGHT AT BEDTIME FOR STOMACH, Disp: , Rfl:     fexofenadine (Allegra Allergy) 180 MG tablet, Take 1 tablet by mouth Daily., Disp: 30 tablet, Rfl: 5    hydrOXYzine (ATARAX) 10 MG tablet, Take 1-2 tablets by mouth every night at bedtime., Disp: , Rfl:     ipratropium-albuterol (DUO-NEB) 0.5-2.5 mg/3 ml nebulizer, Take 3 mL by nebulization 4 (Four) Times a Day As Needed for Wheezing., Disp: 360 mL, Rfl: 3    lisinopril (PRINIVIL,ZESTRIL) 10 MG tablet, Take 1 tablet by mouth Daily., Disp: , Rfl:     metoprolol tartrate (LOPRESSOR) 25 MG tablet, Take 1 tablet by mouth Every 12 (Twelve) Hours., Disp: 180 tablet, Rfl: 3    nitroglycerin (NITROSTAT) 0.4 MG SL tablet, Place 1 tablet under the tongue Every 5 (Five) Minutes As Needed for Chest Pain. Take no more than 3 doses in 15 minutes., Disp: , Rfl:     pantoprazole (Protonix) 40 MG EC tablet, Take 1 tablet by mouth Daily., Disp: 30 tablet, Rfl: 0    Potassium 99 MG tablet, Take 1 tablet by mouth Daily., Disp: , Rfl:     Trelegy Ellipta 200-62.5-25 MCG/ACT inhaler, INHALE 1 PUFF BY INHALATION ONCE A DAY AT THE SAME TIME EACH DAY, Disp: , Rfl:     vitamin D3 125 MCG (5000 UT) capsule capsule, Take 1 capsule by mouth Daily., Disp: , Rfl:     The following portions of the patient's history were reviewed and updated as appropriate: allergies, current medications, past family history, past medical history, past  "social history, past surgical history and problem list.    ROS  Review of Systems   14 point ROS negative except for that listed in the HPI.         Objective:     /82 (BP Location: Right arm, Patient Position: Sitting)   Pulse 96   Ht 154.9 cm (61\")   Wt 95.1 kg (209 lb 9.6 oz)   SpO2 94%   BMI 39.60 kg/m²      Physical Exam  Constitutional: Patient appears well-developed and well-nourished.   HENT: HEENT exam unremarkable.   Neck: Neck supple. No JVD present. No carotid bruits.   Cardiovascular: Normal rate, regular rhythm and normal heart sounds. No murmur heard.   2+ symmetric pulses.   Pulmonary/Chest: Breath sounds normal. Does not exhibit tenderness.   Abdominal: Abdomen benign.   Musculoskeletal: Does not exhibit edema.   Neurological: Neurological exam unremarkable.   Vitals reviewed.    Data Review:   Lab Results   Component Value Date    GLUCOSE 114 (H) 11/16/2023    BUN 19 11/16/2023    CREATININE 0.88 11/16/2023    EGFR 69.5 11/16/2023    BCR 21.6 11/16/2023     11/16/2023    K 4.2 11/16/2023    CO2 29.0 11/16/2023    CALCIUM 10.2 06/19/2024    ALBUMIN 4.1 10/10/2023    AST 16 10/10/2023    ALT 23 10/10/2023     Lab Results   Component Value Date    WBC 7.53 11/16/2023    RBC 4.77 11/16/2023    HGB 13.2 11/16/2023    HCT 43.0 11/16/2023    MCV 90.1 11/16/2023     11/16/2023     Lab Results   Component Value Date    TSH 0.959 09/06/2023     Lab Results   Component Value Date    HGBA1C 5.70 (H) 09/06/2023        Procedures   DEVICE INTERROGATION:  /2016, PPM: RA pacing 25%, RV pacing 2%. P wave is 3.7 mV with a threshold of 0.75 V at 0.5 msec and an impedance of 350 ohms. R wave is >12 mV with a threshold of 0.6 V at 0.5 msec and an impedance of 560 ohms. Battery voltage is 10 years.    Advance Care Planning   ACP discussion was declined by the patient. Patient does not have an advance directive, declines further assistance.           Assessment:      Diagnosis Plan   1. Heart " block, status post PPM Normal device interrogative, no events reported. Continue on aspirin 81 mg for antiplatelet therapy. Continue on nitroglycerin 0.4 mg PRN for chest pain.      2. Essential hypertension  Elevated. Increase lisinopril to 20 mg BID for better hypertensive control. Continue on metoprolol 25 mg BID for rate control and hypertension.       3. Dyslipidemia  No labs for review. Continue on atorvastatin 10 mg daily for hyperlipidemia.         Plan:   Stable cardiac status.   Increase lisinopril to 20 mg BID for better hypertensive control.   Continue rest of the current medications.   FU in 12 MO, sooner as needed.  Thank you for allowing us to participate in the care of your patient.       Scribed for Eric Rosales MD by Natasha Aguiar. 8/27/2024 11:39 EDT    I, Eric Rosales MD, personally performed the services described in this documentation as scribed by the above named individual in my presence, and it is both accurate and complete.  9/3/2024  09:23 EDT      Please note that portions of this note may have been completed with a voice recognition program. Efforts were made to edit the dictations, but occasionally words are mistranscribed.

## 2024-08-29 ENCOUNTER — HOSPITAL ENCOUNTER (OUTPATIENT)
Dept: PHYSICAL THERAPY | Facility: HOSPITAL | Age: 74
Setting detail: THERAPIES SERIES
Discharge: HOME | End: 2024-08-29
Payer: MEDICARE

## 2024-08-29 DIAGNOSIS — M25.511 ACUTE PAIN OF RIGHT SHOULDER: Primary | ICD-10-CM

## 2024-08-29 DIAGNOSIS — M25.611 DECREASED ROM OF RIGHT SHOULDER: ICD-10-CM

## 2024-08-29 DIAGNOSIS — M54.2 PAIN, NECK: ICD-10-CM

## 2024-08-29 PROCEDURE — 97110 THERAPEUTIC EXERCISES: CPT | Performed by: PHYSICAL THERAPIST

## 2024-08-29 PROCEDURE — 97140 MANUAL THERAPY 1/> REGIONS: CPT | Performed by: PHYSICAL THERAPIST

## 2024-08-29 NOTE — THERAPY EVALUATION
Physical Therapy Daily Treatment Note      Patient: Julieta Frey [facility]   : 1950  Referring practitioner: MARY Harvey  Date of Initial Visit: Type: THERAPY  Noted: 2024  Today's Date: 2024  Patient seen for Visit count could not be calculated. Make sure you are using a visit which is associated with an episode. sessions       Visit Diagnoses:    ICD-10-CM ICD-9-CM   1. Acute pain of right shoulder  M25.511 719.41   2. Decreased ROM of right shoulder  M25.611 719.51   3. Pain, neck  M54.2 723.1       Subjective Evaluation    History of Present Illness    Subjective comment: Pt has 7/10 pain today.     Objective   See Exercise, Manual, and Modality Logs for complete treatment.       Assessment & Plan       Assessment  Assessment details: Tx today consisted of exercises for improved postural stability and centralization of symptoms; followed by stm to cervical region and ice for decreased pain.  Pt required cues for TB exercises and reported 7/10 post pain.    Plan  Plan details: Will follow progressing core stability and decreased pain.          Timed:         Manual Therapy:    12     mins  51466;     Therapeutic Exercise:    31     mins  89266;     Neuromuscular Linda:        mins  05631;    Therapeutic Activity:          mins  01856;     Gait Training:           mins  19800;     Ultrasound:          mins  10952;    Ionto                                   mins   17598  Self Care                            mins   16498  Canalith Repos         mins 31426      Un-Timed:  Electrical Stimulation:         mins  33722 ( );  Dry Needling          mins self-pay  Traction          mins 87732      Timed Treatment:   43   mins   Total Treatment:     49   mins(6min ice)    Paul Reno PT  [place] License: [id number]      Electronically signed by Paul Reno PT, 24, 7:39 AM EDT

## 2024-08-30 ENCOUNTER — TRANSCRIBE ORDERS (OUTPATIENT)
Dept: ADMINISTRATIVE | Facility: HOSPITAL | Age: 74
End: 2024-08-30
Payer: MEDICARE

## 2024-08-30 ENCOUNTER — HOSPITAL ENCOUNTER (OUTPATIENT)
Dept: GENERAL RADIOLOGY | Facility: HOSPITAL | Age: 74
Discharge: HOME | End: 2024-08-30
Payer: MEDICARE

## 2024-08-30 DIAGNOSIS — M54.2 NECK PAIN: ICD-10-CM

## 2024-08-30 DIAGNOSIS — M54.2 NECK PAIN: Primary | ICD-10-CM

## 2024-08-30 PROCEDURE — 72050 X-RAY EXAM NECK SPINE 4/5VWS: CPT

## 2024-08-31 ENCOUNTER — HOSPITAL ENCOUNTER (EMERGENCY)
Facility: HOSPITAL | Age: 74
Discharge: HOME | End: 2024-08-31
Attending: STUDENT IN AN ORGANIZED HEALTH CARE EDUCATION/TRAINING PROGRAM
Payer: MEDICARE

## 2024-08-31 VITALS
WEIGHT: 205 LBS | HEIGHT: 61 IN | HEART RATE: 70 BPM | BODY MASS INDEX: 38.71 KG/M2 | TEMPERATURE: 98.3 F | RESPIRATION RATE: 18 BRPM | DIASTOLIC BLOOD PRESSURE: 49 MMHG | OXYGEN SATURATION: 96 % | SYSTOLIC BLOOD PRESSURE: 168 MMHG

## 2024-08-31 DIAGNOSIS — T63.461A WASP STING, ACCIDENTAL OR UNINTENTIONAL, INITIAL ENCOUNTER: Primary | ICD-10-CM

## 2024-08-31 PROCEDURE — 96372 THER/PROPH/DIAG INJ SC/IM: CPT

## 2024-08-31 PROCEDURE — 25010000002 METHYLPREDNISOLONE PER 125 MG: Performed by: PHYSICIAN ASSISTANT

## 2024-08-31 PROCEDURE — 99282 EMERGENCY DEPT VISIT SF MDM: CPT

## 2024-08-31 RX ORDER — DIPHENHYDRAMINE HCL 25 MG
25 TABLET ORAL EVERY 6 HOURS PRN
Qty: 30 TABLET | Refills: 0 | Status: SHIPPED | OUTPATIENT
Start: 2024-08-31

## 2024-08-31 RX ORDER — METHYLPREDNISOLONE SODIUM SUCCINATE 125 MG/2ML
125 INJECTION, POWDER, LYOPHILIZED, FOR SOLUTION INTRAMUSCULAR; INTRAVENOUS ONCE
Status: COMPLETED | OUTPATIENT
Start: 2024-08-31 | End: 2024-08-31

## 2024-08-31 RX ORDER — METHYLPREDNISOLONE 4 MG
TABLET, DOSE PACK ORAL
Qty: 21 TABLET | Refills: 0 | Status: SHIPPED | OUTPATIENT
Start: 2024-08-31

## 2024-08-31 RX ADMIN — METHYLPREDNISOLONE SODIUM SUCCINATE 125 MG: 125 INJECTION, POWDER, FOR SOLUTION INTRAMUSCULAR; INTRAVENOUS at 21:25
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
## 2024-09-03 ENCOUNTER — HOSPITAL ENCOUNTER (OUTPATIENT)
Dept: PHYSICAL THERAPY | Facility: HOSPITAL | Age: 74
Setting detail: THERAPIES SERIES
Discharge: HOME | End: 2024-09-03
Payer: MEDICARE

## 2024-09-03 DIAGNOSIS — M25.611 DECREASED ROM OF RIGHT SHOULDER: ICD-10-CM

## 2024-09-03 DIAGNOSIS — M54.2 PAIN, NECK: ICD-10-CM

## 2024-09-03 DIAGNOSIS — M25.511 ACUTE PAIN OF RIGHT SHOULDER: Primary | ICD-10-CM

## 2024-09-03 PROCEDURE — 97140 MANUAL THERAPY 1/> REGIONS: CPT | Performed by: PHYSICAL THERAPIST

## 2024-09-03 PROCEDURE — 97110 THERAPEUTIC EXERCISES: CPT | Performed by: PHYSICAL THERAPIST

## 2024-09-03 NOTE — THERAPY EVALUATION
Physical Therapy Daily Treatment Note      Patient: Julieta Frey [facility]   : 1950  Referring practitioner: MARY Harvey  Date of Initial Visit: Type: THERAPY  Noted: 2024  Today's Date: 9/3/2024  Patient seen for 9 sessions       Visit Diagnoses:    ICD-10-CM ICD-9-CM   1. Acute pain of right shoulder  M25.511 719.41   2. Decreased ROM of right shoulder  M25.611 719.51   3. Pain, neck  M54.2 723.1       Subjective Evaluation    History of Present Illness    Subjective comment: Pt has 7/10 pain today.  Pt received a CT scan last Friday of her neck.       Objective   See Exercise, Manual, and Modality Logs for complete treatment.       Assessment & Plan       Assessment  Assessment details: Tx today consisted of exercises for improved postural stability and centralization of symptoms; followed by stm to cervical region and ice for decreased pain.  Pt responded well to added lat rows today with no distress.  Pt reported 8/10 post pan.      Plan  Plan details: Will follow progressing core stability and decreased pain.          Timed:         Manual Therapy:    12     mins  89316;     Therapeutic Exercise:    27     mins  95954;     Neuromuscular Linda:        mins  53623;    Therapeutic Activity:          mins  75335;     Gait Training:           mins  37823;     Ultrasound:          mins  77479;    Ionto                                   mins   92489  Self Care                            mins   53533  Canalith Repos         mins 44840      Un-Timed:  Electrical Stimulation:         mins  02374 ( );  Dry Needling          mins self-pay  Traction          mins 75614      Timed Treatment:   39   mins   Total Treatment:     45   mins(6min ice)    Paul Reno PT  [place] License: [id number]      Electronically signed by Paul Reno PT, 24, 7:30 AM EDT

## 2024-09-10 ENCOUNTER — APPOINTMENT (OUTPATIENT)
Dept: PHYSICAL THERAPY | Facility: HOSPITAL | Age: 74
End: 2024-09-10
Payer: MEDICARE

## 2024-09-12 ENCOUNTER — APPOINTMENT (OUTPATIENT)
Dept: PHYSICAL THERAPY | Facility: HOSPITAL | Age: 74
End: 2024-09-12
Payer: MEDICARE

## 2024-09-12 ENCOUNTER — OFFICE VISIT (OUTPATIENT)
Dept: ORTHOPEDIC SURGERY | Facility: CLINIC | Age: 74
End: 2024-09-12
Payer: MEDICARE

## 2024-09-12 VITALS — BODY MASS INDEX: 38.75 KG/M2 | SYSTOLIC BLOOD PRESSURE: 140 MMHG | HEIGHT: 61 IN | DIASTOLIC BLOOD PRESSURE: 84 MMHG

## 2024-09-12 DIAGNOSIS — M19.011 ARTHRITIS OF RIGHT ACROMIOCLAVICULAR JOINT: ICD-10-CM

## 2024-09-12 DIAGNOSIS — M25.511 RIGHT SHOULDER PAIN, UNSPECIFIED CHRONICITY: Primary | ICD-10-CM

## 2024-09-12 DIAGNOSIS — M75.101 ROTATOR CUFF SYNDROME OF RIGHT SHOULDER: ICD-10-CM

## 2024-09-12 DIAGNOSIS — M54.2 CERVICALGIA: ICD-10-CM

## 2024-09-12 RX ORDER — DICLOFENAC SODIUM 75 MG/1
75 TABLET, DELAYED RELEASE ORAL 2 TIMES DAILY PRN
COMMUNITY
Start: 2024-08-30

## 2024-09-12 RX ORDER — LIDOCAINE HYDROCHLORIDE 10 MG/ML
3 INJECTION, SOLUTION EPIDURAL; INFILTRATION; INTRACAUDAL; PERINEURAL
Status: COMPLETED | OUTPATIENT
Start: 2024-09-12 | End: 2024-09-12

## 2024-09-12 RX ORDER — FLUTICASONE PROPIONATE 50 MCG
SPRAY, SUSPENSION (ML) NASAL
COMMUNITY
Start: 2024-06-10

## 2024-09-12 RX ORDER — TRIAMCINOLONE ACETONIDE 40 MG/ML
40 INJECTION, SUSPENSION INTRA-ARTICULAR; INTRAMUSCULAR
Status: COMPLETED | OUTPATIENT
Start: 2024-09-12 | End: 2024-09-12

## 2024-09-12 RX ADMIN — TRIAMCINOLONE ACETONIDE 40 MG: 40 INJECTION, SUSPENSION INTRA-ARTICULAR; INTRAMUSCULAR at 15:06

## 2024-09-12 RX ADMIN — LIDOCAINE HYDROCHLORIDE 3 ML: 10 INJECTION, SOLUTION EPIDURAL; INFILTRATION; INTRACAUDAL; PERINEURAL at 15:06

## 2024-09-12 NOTE — PROGRESS NOTES
Procedure   - Large Joint Arthrocentesis: R subacromial bursa on 9/12/2024 3:06 PM  Indications: pain  Details: 21 G needle, posterior approach  Medications: 3 mL lidocaine PF 1% 1 %; 40 mg triamcinolone acetonide 40 MG/ML  Outcome: tolerated well, no immediate complications  Procedure, treatment alternatives, risks and benefits explained, specific risks discussed. Consent was given by the patient. Immediately prior to procedure a time out was called to verify the correct patient, procedure, equipment, support staff and site/side marked as required. Patient was prepped and draped in the usual sterile fashion.
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
## 2024-09-12 NOTE — PROGRESS NOTES
[facility] Orth[facility] Surgery Clinic Note        Subjective     CC: Pain of the Right Shoulder      HPI    Julieta Holt is a 73 y.o. female. ***     Overall, patient's symptoms are ***    ROS:    Constiutional:Pt denies fever, chills, nausea, or vomiting.  MSK:as above        Objective      Past Medical History  Past Medical History:   Diagnosis Date    Arthritis     Cardiac arrhythmia due to congenital heart disease     Chest pain     Colitis     COPD (chronic obstructive pulmonary disease)     Depression     Elevated cholesterol     Heart murmur     High blood pressure     High cholesterol     History of transfusion     PATIENT DENIES REACTION    Hypertension 2022    Osteoarthritis     Osteoporosis     PONV (postoperative nausea and vomiting)     Seizure disorder     Seizures     last seizure     Sinusitis     Urinary incontinence     Urinary tract infection      Social History     Socioeconomic History    Marital status:    Tobacco Use    Smoking status: Former     Current packs/day: 0.00     Average packs/day: 1.5 packs/day for 30.0 years (45.0 ttl pk-yrs)     Types: Cigarettes     Start date:      Quit date:      Years since quittin.7     Passive exposure: Past    Smokeless tobacco: Never    Tobacco comments:     quit    Vaping Use    Vaping status: Never Used   Substance and Sexual Activity    Alcohol use: Yes     Comment: rarely    Drug use: No    Sexual activity: Defer          Physical Exam  There were no vitals taken for this visit.    There is no height or weight on file to calculate BMI.    Patient is well nourished and well developed.        Ortho Exam  ***    Imaging/Labs/EMG Reviewed and Interpreted:  Imaging Results (Last 24 Hours)       ** No results found for the last 24 hours. **              Assessment    Assessment:  1. Right shoulder pain, unspecified chronicity    2. Cervicalgia        Plan:  Recommend over the counter anti-inflammatories for  pain and/or swelling  ***      Mosnter Ding MA  09/12/24  14:12 EDT      Dictated Utilizing Dragon Dictation.

## 2024-09-17 ENCOUNTER — APPOINTMENT (OUTPATIENT)
Dept: PHYSICAL THERAPY | Facility: HOSPITAL | Age: 74
End: 2024-09-17
Payer: MEDICARE

## 2024-09-19 ENCOUNTER — APPOINTMENT (OUTPATIENT)
Dept: PHYSICAL THERAPY | Facility: HOSPITAL | Age: 74
End: 2024-09-19
Payer: MEDICARE

## 2024-09-20 ENCOUNTER — TRANSCRIBE ORDERS (OUTPATIENT)
Dept: ADMINISTRATIVE | Facility: HOSPITAL | Age: 74
End: 2024-09-20
Payer: MEDICARE

## 2024-09-20 DIAGNOSIS — M25.511 RIGHT SHOULDER PAIN, UNSPECIFIED CHRONICITY: ICD-10-CM

## 2024-09-20 DIAGNOSIS — M54.2 CERVICALGIA: Primary | ICD-10-CM

## 2024-09-24 ENCOUNTER — APPOINTMENT (OUTPATIENT)
Dept: PHYSICAL THERAPY | Facility: HOSPITAL | Age: 74
End: 2024-09-24
Payer: MEDICARE

## 2024-09-26 ENCOUNTER — APPOINTMENT (OUTPATIENT)
Dept: PHYSICAL THERAPY | Facility: HOSPITAL | Age: 74
End: 2024-09-26
Payer: MEDICARE

## 2024-10-03 ENCOUNTER — HOSPITAL ENCOUNTER (OUTPATIENT)
Dept: MRI IMAGING | Facility: HOSPITAL | Age: 74
Discharge: HOME | End: 2024-10-03
Payer: MEDICARE

## 2024-10-03 DIAGNOSIS — M25.511 RIGHT SHOULDER PAIN, UNSPECIFIED CHRONICITY: ICD-10-CM

## 2024-10-03 DIAGNOSIS — M54.2 CERVICALGIA: ICD-10-CM

## 2024-10-21 ENCOUNTER — TELEPHONE (OUTPATIENT)
Dept: CARDIOLOGY | Facility: CLINIC | Age: 74
End: 2024-10-21
Payer: MEDICARE

## 2024-10-21 NOTE — TELEPHONE ENCOUNTER
Pt called in asking if pacemaker is MRI compatible and if she can be cleared to do the MRI. Please advise

## 2024-10-22 NOTE — TELEPHONE ENCOUNTER
Called pt to  let her know pt is MRI compatible and she is not dependent so okay to get the MRI. Pt voiced understanding

## 2024-10-24 ENCOUNTER — OFFICE VISIT (OUTPATIENT)
Dept: ORTHOPEDIC SURGERY | Facility: CLINIC | Age: 74
End: 2024-10-24
Payer: MEDICARE

## 2024-10-24 VITALS
SYSTOLIC BLOOD PRESSURE: 180 MMHG | DIASTOLIC BLOOD PRESSURE: 64 MMHG | HEIGHT: 61 IN | WEIGHT: 206 LBS | BODY MASS INDEX: 38.89 KG/M2

## 2024-10-24 DIAGNOSIS — M75.101 ROTATOR CUFF SYNDROME OF RIGHT SHOULDER: ICD-10-CM

## 2024-10-24 DIAGNOSIS — M25.512 LEFT SHOULDER PAIN, UNSPECIFIED CHRONICITY: ICD-10-CM

## 2024-10-24 DIAGNOSIS — M19.011 ARTHRITIS OF BOTH ACROMIOCLAVICULAR JOINTS: ICD-10-CM

## 2024-10-24 DIAGNOSIS — M54.2 CERVICALGIA: ICD-10-CM

## 2024-10-24 DIAGNOSIS — M19.012 ARTHRITIS OF BOTH ACROMIOCLAVICULAR JOINTS: ICD-10-CM

## 2024-10-24 DIAGNOSIS — M75.102 ROTATOR CUFF SYNDROME OF LEFT SHOULDER: ICD-10-CM

## 2024-10-24 DIAGNOSIS — M25.511 RIGHT SHOULDER PAIN, UNSPECIFIED CHRONICITY: Primary | ICD-10-CM

## 2024-10-24 PROCEDURE — 3078F DIAST BP <80 MM HG: CPT | Performed by: ORTHOPAEDIC SURGERY

## 2024-10-24 PROCEDURE — 99214 OFFICE O/P EST MOD 30 MIN: CPT | Performed by: ORTHOPAEDIC SURGERY

## 2024-10-24 PROCEDURE — 3077F SYST BP >= 140 MM HG: CPT | Performed by: ORTHOPAEDIC SURGERY

## 2024-10-24 RX ORDER — FUROSEMIDE 20 MG
TABLET ORAL
COMMUNITY

## 2024-10-24 NOTE — PROGRESS NOTES
"    [facility] [place]        Subjective     CC: Follow-up (6 week follow up - Right shoulder pain)      HPI    Julieta Holt is a 73 y.o. female.  Patient is here today for follow-up of her right shoulder pain and also for new onset left shoulder pain.  The left shoulder pain again a couple of weeks ago insidiously.  Complains of scapular pain.  Denies any history of trauma.  Right shoulder injection that was given subacromially helped for about a month but her pain has recurred.  Has gotten the greenlight to proceed with MRIs from her cardiologist.    Overall, patient's symptoms are as above    ROS:    Constiutional:Pt denies fever, chills, nausea, or vomiting.  MSK:as above        Objective      Past Medical History  Past Medical History:   Diagnosis Date    Arthritis     Cardiac arrhythmia due to congenital heart disease     Chest pain     Colitis     COPD (chronic obstructive pulmonary disease)     Depression     Elevated cholesterol     Heart murmur     High blood pressure     High cholesterol     History of transfusion     PATIENT DENIES REACTION    Hypertension 2022    Osteoarthritis     Osteoporosis     PONV (postoperative nausea and vomiting)     Seizure disorder     Seizures     last seizure     Sinusitis     Urinary incontinence     Urinary tract infection      Social History     Socioeconomic History    Marital status:    Tobacco Use    Smoking status: Former     Current packs/day: 0.00     Average packs/day: 1.5 packs/day for 30.0 years (45.0 ttl pk-yrs)     Types: Cigarettes     Start date:      Quit date:      Years since quittin.8     Passive exposure: Past    Smokeless tobacco: Never    Tobacco comments:     quit    Vaping Use    Vaping status: Never Used   Substance and Sexual Activity    Alcohol use: Yes     Comment: rarely    Drug use: No    Sexual activity: Defer          Physical Exam  /64   Ht 154.9 cm (60.98\")   Wt " 93.4 kg (206 lb)   BMI 38.94 kg/m²     Body mass index is 38.94 kg/m².    Patient is well nourished and well developed.        Ortho Exam  Musculoskeletal   Upper Extremity   Right Shoulder       Strength and Tone:    Supraspinatus -5 out of 5 with pain    External Rotators-5/5    Infraspinatus - 5/5    Subscapularis - 5/5    Deltoid - 5/5     Range of Motion      Right Shoulder:    Internal Rotation: ROM - L4    External Rotation: AROM - 70 degrees    Elevation through flexion: AROM - 140 degrees     AC joint:  non tender to palpation    AC joint:  negative crossover    Medial border scapula tender      Musculoskeletal   Upper Extremity   Left Shoulder       Strength and Tone:    Supraspinatus -5 out of 5 with pain    External Rotators-5/5    Infraspinatus - 5/5    Subscapularis - 5/5    Deltoid - 5/5     Range of Motion      Left Shoulder:    Internal Rotation: ROM - L4    External Rotation: AROM - 70 degrees    Elevation through flexion: AROM - 140 degrees     AC joint:  non tender to palpation    AC joint:  negative crossover    Medial border scapula tender      Imaging/Labs/EMG Reviewed and Interpreted:  Imaging Results (Last 24 Hours)       Procedure Component Value Units Date/Time    XR Shoulder 2+ View Left [[id number]] Resulted: 10/24/24 0958     Updated: 10/24/24 0958    Narrative:      Left Shoulder X-Ray    Indication: Pain    Study:  Grashey AP, axillary lateral, and scapular Y views    Comparison: None    Findings:  No acute fractures are visualized  No bony lesions are visualized.  Normal soft tissue appearance  AC joint: Severe hypertrophic joint space narrowing  Glenohumeral joint: Mild joint space narrowing  Acromion type: 2  Pacemaker obscures detail of the inferior aspect of the shoulder      Impression:    No acute bony abnormalities noted  Pacemaker left hemithorax  Severe hypertrophic AC joint space narrowing  Type II acromion              Assessment    Assessment:  1. Right shoulder pain,  unspecified chronicity    2. Rotator cuff syndrome of right shoulder    3. Left shoulder pain, unspecified chronicity    4. Rotator cuff syndrome of left shoulder    5. Arthritis of both acromioclavicular joints    6. Cervicalgia        Plan:  Recommend over the counter anti-inflammatories for pain and/or swelling  Bilateral shoulder pain left greater than right in the face of underlying cervicalgia--MRI of her left shoulder will be ordered today.  I will see the patient back after the MRI of her right shoulder, left shoulder, and cervical spine.      Marco Antonio Keating MD  10/24/24  10:16 EDT      Dictated Utilizing Dragon Dictation.

## 2024-10-29 ENCOUNTER — TRANSCRIBE ORDERS (OUTPATIENT)
Dept: ADMINISTRATIVE | Facility: HOSPITAL | Age: 74
End: 2024-10-29
Payer: MEDICARE

## 2024-10-29 DIAGNOSIS — M25.512 BILATERAL SHOULDER PAIN, UNSPECIFIED CHRONICITY: ICD-10-CM

## 2024-10-29 DIAGNOSIS — M54.2 CERVICALGIA: Primary | ICD-10-CM

## 2024-10-29 DIAGNOSIS — M25.511 BILATERAL SHOULDER PAIN, UNSPECIFIED CHRONICITY: ICD-10-CM

## 2024-11-13 ENCOUNTER — HOSPITAL ENCOUNTER (OUTPATIENT)
Dept: MRI IMAGING | Facility: HOSPITAL | Age: 74
Discharge: HOME | End: 2024-11-13
Payer: MEDICARE

## 2024-11-13 ENCOUNTER — TELEPHONE (OUTPATIENT)
Dept: CARDIOLOGY | Facility: CLINIC | Age: 74
End: 2024-11-13
Payer: MEDICARE

## 2024-11-13 DIAGNOSIS — M25.511 BILATERAL SHOULDER PAIN, UNSPECIFIED CHRONICITY: ICD-10-CM

## 2024-11-13 DIAGNOSIS — M25.512 BILATERAL SHOULDER PAIN, UNSPECIFIED CHRONICITY: ICD-10-CM

## 2024-11-13 NOTE — TELEPHONE ENCOUNTER
REQUEST FOR CARDIAC CLEARANCE    Caller name: Julieta Frey [facility]     Phone Number: [phone]     Type of planned surgery: MRI    Date of planned surgery: UNKNOWN    Type of anesthesia: UNKNOWN    Have you been experiencing chest pain or shortness of breath? PT SAID SHE GETS SHORT OF BREATH ON EXERTION    Is your doctor requesting for you to stop any of your medications prior to your surgery? UNKNOWN    Where should we fax the clearance to?  PT IS NEEDING  TO CONTACT THE PEOPLE WHO MANUFACTURED HER PACEMAKER SO THEY CAN MAKE IT TO WHERE SHE CAN HAVE THE MRI. SHE SAID THEY NEED TO COME TO [place] FOR THE MRI.

## 2024-11-13 NOTE — TELEPHONE ENCOUNTER
Device is [employer] and MRI compatible.  Cardiology approval form filled out and will be signed and scanned into media in the am.

## 2024-11-15 ENCOUNTER — TELEPHONE (OUTPATIENT)
Dept: CARDIOLOGY | Facility: CLINIC | Age: 74
End: 2024-11-15
Payer: MEDICARE

## 2024-12-10 ENCOUNTER — HOSPITAL ENCOUNTER (OUTPATIENT)
Dept: MRI IMAGING | Facility: HOSPITAL | Age: 74
Discharge: HOME | End: 2024-12-10
Payer: MEDICARE

## 2024-12-10 VITALS
SYSTOLIC BLOOD PRESSURE: 127 MMHG | RESPIRATION RATE: 18 BRPM | HEART RATE: 71 BPM | OXYGEN SATURATION: 93 % | DIASTOLIC BLOOD PRESSURE: 41 MMHG

## 2024-12-10 PROCEDURE — 73221 MRI JOINT UPR EXTREM W/O DYE: CPT

## 2024-12-10 PROCEDURE — 72141 MRI NECK SPINE W/O DYE: CPT

## 2024-12-17 ENCOUNTER — OFFICE VISIT (OUTPATIENT)
Dept: ORTHOPEDIC SURGERY | Facility: CLINIC | Age: 74
End: 2024-12-17
Payer: MEDICARE

## 2024-12-17 VITALS
HEIGHT: 61 IN | SYSTOLIC BLOOD PRESSURE: 160 MMHG | BODY MASS INDEX: 38.88 KG/M2 | WEIGHT: 205.91 LBS | DIASTOLIC BLOOD PRESSURE: 80 MMHG

## 2024-12-17 DIAGNOSIS — M25.512 LEFT SHOULDER PAIN, UNSPECIFIED CHRONICITY: ICD-10-CM

## 2024-12-17 DIAGNOSIS — M75.111 INCOMPLETE TEAR OF RIGHT ROTATOR CUFF, UNSPECIFIED WHETHER TRAUMATIC: ICD-10-CM

## 2024-12-17 DIAGNOSIS — M75.102 ROTATOR CUFF SYNDROME OF LEFT SHOULDER: ICD-10-CM

## 2024-12-17 DIAGNOSIS — M54.2 CERVICALGIA: ICD-10-CM

## 2024-12-17 DIAGNOSIS — M19.011 ARTHRITIS OF BOTH ACROMIOCLAVICULAR JOINTS: ICD-10-CM

## 2024-12-17 DIAGNOSIS — M19.012 ARTHRITIS OF BOTH ACROMIOCLAVICULAR JOINTS: ICD-10-CM

## 2024-12-17 DIAGNOSIS — M50.222 HERNIATED NUCLEUS PULPOSUS, C5-6 LEFT: ICD-10-CM

## 2024-12-17 DIAGNOSIS — M25.511 RIGHT SHOULDER PAIN, UNSPECIFIED CHRONICITY: Primary | ICD-10-CM

## 2024-12-17 NOTE — PROGRESS NOTES
"    [facility] [place] [facility] Note        Subjective     CC: Follow-up (6 weeks MRI  follow up - Right shoulder pain  (MRI on 10/29/24))      LATOYA Holt is a 74 y.o. female.  Patient is here today for follow-up of the cervical spine and right and left shoulder MRIs.  Has left greater than right shoulder pain and shoulder weakness and arm weakness.  Has scapular pain on the left side.    Overall, patient's symptoms are as above    ROS:    Constiutional:Pt denies fever, chills, nausea, or vomiting.  MSK:as above        Objective      Past Medical History  Past Medical History:   Diagnosis Date    Arthritis     Cardiac arrhythmia due to congenital heart disease     Chest pain     Colitis     COPD (chronic obstructive pulmonary disease)     Depression     Elevated cholesterol     Heart murmur     High blood pressure     High cholesterol     History of transfusion     PATIENT DENIES REACTION    Hypertension 2022    Osteoarthritis     Osteoporosis     PONV (postoperative nausea and vomiting)     Seizure disorder     Seizures     last seizure     Sinusitis     Urinary incontinence     Urinary tract infection      Social History     Socioeconomic History    Marital status:    Tobacco Use    Smoking status: Former     Current packs/day: 0.00     Average packs/day: 1.5 packs/day for 30.0 years (45.0 ttl pk-yrs)     Types: Cigarettes     Start date:      Quit date:      Years since quittin.9     Passive exposure: Past    Smokeless tobacco: Never    Tobacco comments:     quit    Vaping Use    Vaping status: Never Used   Substance and Sexual Activity    Alcohol use: Yes     Comment: rarely    Drug use: No    Sexual activity: Defer          Physical Exam  /80   Ht 154.9 cm (60.98\")   Wt 93.4 kg (205 lb 14.6 oz)   BMI 38.93 kg/m²     Body mass index is 38.93 kg/m².    Patient is well nourished and well developed.        Ortho Exam  Patient has forward " elevation to 140 degrees bilaterally.  Abduction to 110 degrees bilaterally.  5 out of 5 deltoid bicep tricep.    Imaging/Labs/EMG Reviewed and Interpreted:  Imaging Results (Last 24 Hours)       ** No results found for the last 24 hours. **          We have reviewed and interpreted the MRI of the patient's cervical spine, right and left shoulders.  The right side seems to have little intrasubstance tendinopathy of the supraspinatus.  Subscap is at home.  No significant amount of fluid around the long of the biceps.  Left side has less dramatic changes.  Patient also has what appears to be left greater than right herniated disc at C5-6.    Assessment    Assessment:  1. Right shoulder pain, unspecified chronicity    2. Left shoulder pain, unspecified chronicity    3. Rotator cuff syndrome of left shoulder    4. Arthritis of both acromioclavicular joints    5. Cervicalgia    6. Incomplete tear of right rotator cuff, unspecified whether traumatic    7. Herniated nucleus pulposus, C5-6 left        Plan:  Recommend over the counter anti-inflammatories for pain and/or swelling  Bilateral shoulder pain left greater than right in a patient with herniated disc at C5-6 and chronic cervicalgia--with regards to the patient's shoulder pathology, no surgery needs to be done.  I would like to get her referred to my friend waqar Fernandez for an evaluation.  I told her there is no guarantee that any type of surgery will be recommended there either.  I will keep her off work for now and then if she needs a further amount of time off of work, she can go through her PCP.  Follow-up with me as needed going forward.      Marco Antonio Keating MD  12/17/24  13:24 EST      Dictated Utilizing Dragon Dictation.

## 2025-01-08 ENCOUNTER — INFUSION (OUTPATIENT)
Dept: ONCOLOGY | Facility: HOSPITAL | Age: 75
End: 2025-01-08
Payer: MEDICARE

## 2025-01-08 VITALS
RESPIRATION RATE: 18 BRPM | HEART RATE: 117 BPM | SYSTOLIC BLOOD PRESSURE: 120 MMHG | DIASTOLIC BLOOD PRESSURE: 69 MMHG | TEMPERATURE: 97.3 F | OXYGEN SATURATION: 98 %

## 2025-01-08 DIAGNOSIS — M81.0 OSTEOPOROSIS, UNSPECIFIED OSTEOPOROSIS TYPE, UNSPECIFIED PATHOLOGICAL FRACTURE PRESENCE: Primary | ICD-10-CM

## 2025-01-08 LAB
ALBUMIN SERPL-MCNC: 4.3 G/DL (ref 3.5–5.2)
ALBUMIN/GLOB SERPL: 1.4 G/DL
ALP SERPL-CCNC: 102 U/L (ref 39–117)
ALT SERPL W P-5'-P-CCNC: 12 U/L (ref 1–33)
ANION GAP SERPL CALCULATED.3IONS-SCNC: 14.6 MMOL/L (ref 5–15)
AST SERPL-CCNC: 16 U/L (ref 1–32)
BILIRUB SERPL-MCNC: 0.4 MG/DL (ref 0–1.2)
BUN SERPL-MCNC: 23 MG/DL (ref 8–23)
BUN/CREAT SERPL: 24 (ref 7–25)
CALCIUM SPEC-SCNC: 10.1 MG/DL (ref 8.6–10.5)
CHLORIDE SERPL-SCNC: 104 MMOL/L (ref 98–107)
CO2 SERPL-SCNC: 25.4 MMOL/L (ref 22–29)
CREAT SERPL-MCNC: 0.96 MG/DL (ref 0.57–1)
EGFRCR SERPLBLD CKD-EPI 2021: 61.8 ML/MIN/1.73
GLOBULIN UR ELPH-MCNC: 3.1 GM/DL
GLUCOSE SERPL-MCNC: 98 MG/DL (ref 65–99)
POTASSIUM SERPL-SCNC: 3.4 MMOL/L (ref 3.5–5.2)
PROT SERPL-MCNC: 7.4 G/DL (ref 6–8.5)
SODIUM SERPL-SCNC: 144 MMOL/L (ref 136–145)

## 2025-01-08 PROCEDURE — 96372 THER/PROPH/DIAG INJ SC/IM: CPT

## 2025-01-08 PROCEDURE — 25010000002 DENOSUMAB 60 MG/ML SOLUTION PREFILLED SYRINGE: Performed by: NURSE PRACTITIONER

## 2025-01-08 PROCEDURE — 80053 COMPREHEN METABOLIC PANEL: CPT

## 2025-01-08 RX ADMIN — DENOSUMAB 60 MG: 60 INJECTION SUBCUTANEOUS at 15:13

## 2025-01-29 ENCOUNTER — OFFICE VISIT (OUTPATIENT)
Dept: PULMONOLOGY | Facility: CLINIC | Age: 75
End: 2025-01-29
Payer: MEDICARE

## 2025-01-29 VITALS
DIASTOLIC BLOOD PRESSURE: 76 MMHG | HEART RATE: 88 BPM | OXYGEN SATURATION: 96 % | SYSTOLIC BLOOD PRESSURE: 124 MMHG | TEMPERATURE: 96.9 F | WEIGHT: 204.8 LBS | HEIGHT: 61 IN | BODY MASS INDEX: 38.67 KG/M2

## 2025-01-29 DIAGNOSIS — J44.9 CHRONIC OBSTRUCTIVE PULMONARY DISEASE, UNSPECIFIED COPD TYPE: ICD-10-CM

## 2025-01-29 DIAGNOSIS — G47.33 OSA (OBSTRUCTIVE SLEEP APNEA): Primary | ICD-10-CM

## 2025-01-29 RX ORDER — MONTELUKAST SODIUM 10 MG/1
10 TABLET ORAL NIGHTLY
Qty: 30 TABLET | Refills: 5 | Status: SHIPPED | OUTPATIENT
Start: 2025-01-29

## 2025-01-29 NOTE — PROGRESS NOTES
"Chief Complaint  Sleep Apnea    Subjective          Juleita Tk Holt presents to [facility] for   History of Present Illness    Ms. Holt is a 74 year old female with a medical history significant for hypertension, arthritis, COPD, depression, hypertension, seizure disorder.     She presents today for follow up sleep apnea.  She reports that she is not using cpap at night.  She tells me that she was waking up every morning with it off her face. She reports that she had been taking it off in the middle of the night and didn't know it.  She also reports that she have been having a productive cough that is worse in the evenings. She is currently taking Trelegy once daily and albuterol as needed.        Objective   Vital Signs:   /76   Pulse 88   Temp 96.9 °F (36.1 °C)   Ht 154.9 cm (60.98\")   Wt 92.9 kg (204 lb 12.8 oz)   SpO2 96%   BMI 38.72 kg/m²         Physical Exam    GENERAL APPEARANCE: Well developed, well nourished, alert and cooperative, and appears to be in no acute distress.    HEAD: normocephalic. Atraumatic.    EYES: PERRL, EOMI. Vision is grossly intact.    THROAT: Oral cavity and pharynx normal. No inflammation, swelling, exudate, or lesions.     NECK: Neck supple.  No thyromegaly.    CARDIAC: Normal S1 and S2. No S3, S4 or murmurs. Rhythm is regular.     RESPIRATORY:Bilateral air entry positive.  No wheezing, crackles or rhonchi noted.    GI: Positive bowel sounds. Soft, nondistended, nontender.     MUSCULOSKELETAL: No significant deformity or joint abnormality. No edema. Peripheral pulses intact. No varicosities.    NEUROLOGICAL: Strength and sensation symmetric and intact throughout.     PSYCHIATRIC: The mental examination revealed the patient was oriented to person, place, and time.       Estimated body mass index is 38.72 kg/m² as calculated from the following:    Height as of this encounter: 154.9 cm (60.98\").    " "Weight as of this encounter: 92.9 kg (204 lb 12.8 oz).        Result Review :   The following data was reviewed by: MARY Chu on 01/29/2025:  Common labs          6/19/2024    13:22 1/8/2025    14:21   Common Labs   Glucose  98    BUN  23    Creatinine  0.96    Sodium  144    Potassium  3.4    Chloride  104    Calcium 10.2  10.1    Albumin  4.3    Total Bilirubin  0.4    Alkaline Phosphatase  102    AST (SGOT)  16    ALT (SGPT)  12           PFT:12/1/17  Normal spirometry with no significant broncho-dilator responsiveness and supervision.  Lung volume shows mild restrictive lung disease which is likely due to desire as flow-volume loop does not show any restriction.  DLCO is normal.  Flow volume loop is normal            Low dose lung cancer screening:NA    Previous chest imaging:NA    Alpha-1 antitrypsin screening:NA    STOP-Bang Score:   NA  [place] Sleepiness Scale:   NA      ABG:    pH No results found for: \"PHART\"   pO2 No results found for: \"PO2ART\"   pCO2 No results found for: \"NCB2KWT\"   HCO3 No results found for: \"IOY7QRU\"                      Assessment and Plan    Problem List Items Addressed This Visit    None  Visit Diagnoses       CHRISTIAN (obstructive sleep apnea)    -  Primary    Chronic obstructive pulmonary disease, unspecified COPD type        Relevant Medications    ipratropium (ATROVENT) 0.02 % nebulizer solution    montelukast (SINGULAIR) 10 MG tablet            Julieta Frey [facility]  reports that she quit smoking about 21 years ago. Her smoking use included cigarettes. She started smoking about 51 years ago. She has a 45 pack-year smoking history. She has been exposed to tobacco smoke. She has never used smokeless tobacco.     She reports that she is not using her cpap at night because she kept taking off her mask.   Patient was educated on positive airway pressure treatment.  As per CMS guidelines, more than 4 hours on 70% of observed nights is considered adherence. " Patient was strongly encouraged to use CPAP as much as possible during sleep as more CPAP use is equal to more benefit. Use of heated humidification in positive airway pressure treatment to improve the adherence to the device.  In case of claustrophobia, we will provide the patient cognitive behavioral therapy and desensitization. Oral appliances use will be discussed with the patient in case of mild to moderate sleep apnea or if the patient with severe disease fail positive airway pressure treatment.       The patient was extensively educated on the consequences of untreated obstructive sleep apnea namely cardiovascular/metabolic disorder, neurocognitive deficit, daytime sleepiness, motor vehicle accidents, depression, mood disorders and reduced quality of life.  At the end of conversation, the patient voices understanding of the disease process and treatment modality.  Patient also understands the risk of untreated obstructive sleep apnea and benefit benefits of the treatment.    Counseling time was greater than 10 minutes.         Continue Trelegy once daily.  Continue albuterol as needed.    Sent her in Atrovent neb treatments.   Started her on Singulair.      Follow Up   Return in about 6 months (around 7/29/2025).  Patient was given instructions and counseling regarding her condition or for health maintenance advice. Please see specific information pulled into the AVS if appropriate.

## 2025-02-03 ENCOUNTER — OFFICE VISIT (OUTPATIENT)
Dept: NEUROSURGERY | Facility: CLINIC | Age: 75
End: 2025-02-03
Payer: MEDICARE

## 2025-02-03 ENCOUNTER — TELEPHONE (OUTPATIENT)
Dept: NEUROSURGERY | Facility: CLINIC | Age: 75
End: 2025-02-03

## 2025-02-03 VITALS
SYSTOLIC BLOOD PRESSURE: 140 MMHG | BODY MASS INDEX: 39.06 KG/M2 | DIASTOLIC BLOOD PRESSURE: 68 MMHG | HEIGHT: 61 IN | WEIGHT: 206.9 LBS

## 2025-02-03 DIAGNOSIS — M47.812 CERVICAL SPONDYLOSIS WITHOUT MYELOPATHY: Primary | ICD-10-CM

## 2025-02-03 PROCEDURE — 1159F MED LIST DOCD IN RCRD: CPT | Performed by: PHYSICIAN ASSISTANT

## 2025-02-03 PROCEDURE — 3077F SYST BP >= 140 MM HG: CPT | Performed by: PHYSICIAN ASSISTANT

## 2025-02-03 PROCEDURE — 3078F DIAST BP <80 MM HG: CPT | Performed by: PHYSICIAN ASSISTANT

## 2025-02-03 PROCEDURE — 1160F RVW MEDS BY RX/DR IN RCRD: CPT | Performed by: PHYSICIAN ASSISTANT

## 2025-02-03 PROCEDURE — 99204 OFFICE O/P NEW MOD 45 MIN: CPT | Performed by: PHYSICIAN ASSISTANT
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
## 2025-02-03 NOTE — LETTER
February 3, 2025     Patient: Julieta Holt   YOB: 1950   Date of Visit: 2/3/2025       To Whom It May Concern:    It is my medical opinion that Julieta Holt  should remain on and off work status .  She was seen in the neurosurgery office today with cervical pain and pain into the arms with associated numbness and tingling into the arms and hands.  Her MRI shows cervical spondylosis and bulging disc.  I have ordered EMG and nerve conduction studies to evaluate for the degree of nerve compression.  Her examination is concerning for left carpal tunnel syndrome and left ulnar neuropathy as well.  Nerve studies to confirm.  Based upon the findings and our treatment plan be at conservative treatment with pain management or surgery will determine her timing to return to work.           Sincerely,        Rebecca Vanegas PA-C    CC: No Recipients

## 2025-02-03 NOTE — TELEPHONE ENCOUNTER
Caller: JYOTI    Relationship:     Best call back number: [phone]      What form or medical record are you requesting: RETURN TO WORK LETTER    Who is requesting this form or medical record from you: EMPLOYER    How would you like to receive the form or medical records (pick-up, mail, fax): MAIL    If mail, what is the address:   [address]   [address] [place] [address]    Timeframe paperwork needed: ASAP    Additional notes: PLEASE ADDRESS IT : IN CARE OF BRYAN BIRD

## 2025-02-03 NOTE — LETTER
February 3, 2025     Patient: Julieta Holt   YOB: 1950   Date of Visit: 2/3/2025       To Whom It May Concern:    It is my medical opinion that Julieta Holt  is not medically ready to return to work .  Patient presents for neurosurgical evaluation today, 2/3/2025, regarding neck pain, arm pain numbness and tingling.  I am ordering further diagnostic testing and the patient will return to see me in follow-up.  Based upon the findings we will determine next plan for treatment.           Sincerely,        Rebecca Vanegas PA-C    CC: No Recipients

## 2025-02-04 ENCOUNTER — TRANSCRIBE ORDERS (OUTPATIENT)
Dept: ADMINISTRATIVE | Facility: HOSPITAL | Age: 75
End: 2025-02-04
Payer: MEDICARE

## 2025-02-04 DIAGNOSIS — Z13.820 ENCOUNTER FOR OSTEOPOROSIS SCREENING IN ASYMPTOMATIC POSTMENOPAUSAL PATIENT: Primary | ICD-10-CM

## 2025-02-04 DIAGNOSIS — Z78.0 ENCOUNTER FOR OSTEOPOROSIS SCREENING IN ASYMPTOMATIC POSTMENOPAUSAL PATIENT: Primary | ICD-10-CM

## 2025-02-06 ENCOUNTER — HOSPITAL ENCOUNTER (OUTPATIENT)
Dept: PHYSICAL THERAPY | Facility: HOSPITAL | Age: 75
Setting detail: THERAPIES SERIES
Discharge: HOME | End: 2025-02-06
Payer: MEDICARE

## 2025-02-06 DIAGNOSIS — M47.812 CERVICAL SPONDYLOSIS WITHOUT MYELOPATHY: Primary | ICD-10-CM

## 2025-02-06 PROCEDURE — 97162 PT EVAL MOD COMPLEX 30 MIN: CPT | Performed by: PHYSICAL THERAPIST

## 2025-02-06 PROCEDURE — 97110 THERAPEUTIC EXERCISES: CPT | Performed by: PHYSICAL THERAPIST

## 2025-02-06 NOTE — THERAPY EVALUATION
"    Physical Therapy Initial Evaluation and Plan of Care    Patient: Julieta Frey [facility]   : 1950  Referring practitioner: Rebecca Vanegas PA-C  Date of Initial Visit: 2025  Today's Date: 2025  Patient seen for 1 session         Visit Diagnoses:    ICD-10-CM ICD-9-CM   1. Cervical spondylosis without myelopathy  M47.812 721.0         Subjective Questionnaire: NDI:38% impaired      Subjective Evaluation    History of Present Illness  Onset date: 2024.  Mechanism of injury: Pt reports ongoing neck pain since 2024, reporting no known cause. She states an MRI was performed which revealed   \"1.  C5-C6 disc desiccation and broad-based posterior disc herniation  causing moderate central canal stenosis and mild bilateral neural  foraminal stenosis.  2.  C4-C5 disc desiccation with posterior bulging causing mild central  canal stenosis and bilateral neural foraminal stenosis.\"  The patient reports difficulty with lifting, personal care, looking down to read, and performing daily tasks. She states she was referred to physical therapy by neurology. The patient reports she has not received injections. She states pain radiates into the right elbow and left fingertips. The patient reports, \"the heating pad is all that seems to help\".      Precautions and Work Restrictions: PacemakerQuality of life: good    Pain  Current pain ratin  At best pain ratin  At worst pain rating: 10  Location: Neck  Quality: knife-like and burning  Relieving factors: heat  Aggravating factors: prolonged positioning, overhead activity, lifting and movement  Progression: worsening    Social Support  Lives in: multiple-level home  Lives with: Friend.    Hand dominance: right    Diagnostic Tests  MRI studies: abnormal (See chart review.)    Patient Goals  Patient goals for therapy: decreased edema, decreased pain, increased motion, increased strength, independence with ADLs/IADLs and return to sport/leisure " activities             Objective          Palpation   Left   No palpable tenderness to the cervical interspinals, cervical paraspinals, intercostals, latissimus, levator scapulae, lower trapezius, middle trapezius, pectoralis major, pectoralis minor, rhomboids, scalenes, sternocleidomastoid and upper trapezius.   Tenderness of the suboccipitals.     Right   No palpable tenderness to the cervical interspinals, deltoid, intercostals, latissimus, levator scapulae, lower trapezius, middle trapezius, pectoralis major, pectoralis minor, scalenes and sternocleidomastoid.   Muscle spasm in the upper trapezius. Tenderness of the cervical paraspinals, rhomboids, suboccipitals and upper trapezius.     Tenderness   Cervical Spine   Tenderness in the spinous process and right transverse process.   No tenderness in the left transverse process.     Neurological Testing     Sensation   Cervical/Thoracic   Left   Diminished: light touch    Right   Intact: light touch    Active Range of Motion   Cervical/Thoracic Spine   Cervical    Flexion: 35 degrees   Extension: 25 degrees   Left lateral flexion: 30 degrees   Right lateral flexion: 26 degrees   Left rotation: 30 degrees   Right rotation: 22 degrees     Tests   Cervical     Left   Positive Spurling's sign.   Negative cervical distraction, ULTT1, ULTT3 and ULTT4.     Right   Positive Spurling's sign and ULTT3.   Negative cervical distraction, ULTT1 and ULTT4.           Assessment & Plan       Assessment  Impairments: abnormal muscle firing, abnormal muscle tone, abnormal or restricted ROM, activity intolerance, impaired physical strength, lacks appropriate home exercise program, pain with function and safety issue   Functional limitations: carrying objects, lifting, sleeping, pulling, pushing, uncomfortable because of pain, reaching behind back, reaching overhead and unable to perform repetitive tasks   Assessment details: The patient is a 74 year old female presenting to the clinic  with cervical pain. She demonstrated impaired cervical AROM as well as BUE weakness. Positive special tests include bilateral Spurling's and a right ULTT3, which are indicative of possible neural involvement. The patient reported functional limitations with a 38% impairment on the NDI. She would benefit from skilled physical therapy to address functional limitations and impairments. Today's session included therapeutic exercises for improved cervical ROM and scapular stability. Tactile and verbal cues were provided for proper form. Pt education included HEP, POC, and findings. The patient verbalized understanding.  Prognosis: good    Goals  Plan Goals: SHORT TERM GOALS:     2 weeks  1. Pt will be instructed in HEP for improved independence.  2. Pt to demonstrate a ten degree improvement in cervical AROM to allow for improved ability to perform ADL's.  3. Pt will report no more than 2/10 neck pain with driving, for improved safety with rotating the head.    LONG TERM GOALS:   6 weeks  1. Pt to demonstrate cervical AROM WFL to allow for improved safety when driving.  2. Pt to demonstrate ability to lift 10# OH with BUE, without increase in pain in the neck for improved ability to obtain objects from overhead.  3. Pt will report less than 20% impairment on the NDI for improved function.    Plan  Therapy options: will be seen for skilled therapy services  Planned modality interventions: cryotherapy, dry needling, thermotherapy (hydrocollator packs) and traction  Planned therapy interventions: abdominal trunk stabilization, body mechanics training, fine motor coordination training, functional ROM exercises, home exercise program, joint mobilization, manual therapy, motor coordination training, neuromuscular re-education, postural training, soft tissue mobilization, spinal/joint mobilization, strengthening, stretching and therapeutic activities  Frequency: 2x week  Duration in weeks: 12  Treatment plan discussed with:  patient  Plan details: Pt will be re-assessed upon follow up for tolerance to pain relieving exercise program.     Moderate Evaluation  60265  Re-evaluation   46406    Therapeutic exercise  67427  Therapeutic activity    15125  Neuromuscular re-education   77439  Manual therapy   47615  Gait training  21223    Moist heat/cryotherapy 30992   Iontophoresis   43553  Mechanical traction 12140            Timed:         Manual Therapy:         mins  59623;     Therapeutic Exercise:    10     mins  67331;     Neuromuscular Linda:        mins  50721;    Therapeutic Activity:          mins  52079;     Gait Training:           mins  49976;     Ultrasound:          mins  24846;    Ionto                                   mins   19468  Self Care                            mins   98923      Un-Timed:  Electrical Stimulation:         mins  84989 ( );  Dry Needling          mins self-pay  Traction          mins 87099  Low Eval          Mins 25502  Mod Eval     40     Mins 95783  High Eval                            Mins 11613  Re-Eval          Mins 90292  Canalith Repos         mins 59403      Timed Treatment:   10   mins   Total Treatment:     50   mins          PT: Ashley Claudene Dalton, PT     License Number: [id number]  Electronically signed by Ashley Claudene Dalton, PT, 02/06/25, 1:14 PM EST    Certification Period: 2/6/2025 thru 5/6/2025  I certify that the therapy services are furnished while this patient is under my care.  The services outlined above are required by this patient, and will be reviewed every 90 days.         Physician Signature:__________________________________________________    PHYSICIAN: Rebecca Vanegas PA-C  NPI: [id number]                                      DATE:      Please sign and return via fax to [email or web address] . Thank you, [facility].

## 2025-02-11 ENCOUNTER — APPOINTMENT (OUTPATIENT)
Dept: PHYSICAL THERAPY | Facility: HOSPITAL | Age: 75
End: 2025-02-11
Payer: MEDICARE

## 2025-02-12 RX ORDER — METOPROLOL TARTRATE 25 MG/1
25 TABLET, FILM COATED ORAL EVERY 12 HOURS SCHEDULED
Qty: 180 TABLET | Refills: 0 | Status: SHIPPED | OUTPATIENT
Start: 2025-02-12

## 2025-02-13 ENCOUNTER — HOSPITAL ENCOUNTER (OUTPATIENT)
Dept: PHYSICAL THERAPY | Facility: HOSPITAL | Age: 75
Setting detail: THERAPIES SERIES
Discharge: HOME | End: 2025-02-13
Payer: MEDICARE

## 2025-02-13 DIAGNOSIS — M47.812 CERVICAL SPONDYLOSIS WITHOUT MYELOPATHY: Primary | ICD-10-CM

## 2025-02-13 PROCEDURE — 97140 MANUAL THERAPY 1/> REGIONS: CPT | Performed by: PHYSICAL THERAPIST

## 2025-02-13 PROCEDURE — 97110 THERAPEUTIC EXERCISES: CPT | Performed by: PHYSICAL THERAPIST

## 2025-02-13 NOTE — THERAPY TREATMENT NOTE
Physical Therapy Daily Treatment Note      Patient: Julieta Frey [facility]   : 1950  Referring practitioner: Rebecca Vanegas PA-C  Date of Initial Visit: Type: THERAPY  Episode: Neck pain  Today's Date: 2025  Patient seen for 2 sessions       Visit Diagnoses:    ICD-10-CM ICD-9-CM   1. Cervical spondylosis without myelopathy  M47.812 721.0       Subjective Evaluation    History of Present Illness    Subjective comment: Pt reports 9/10 neck pain prior to today's session.Pain  Current pain ratin         Objective   See Exercise, Manual, and Modality Logs for complete treatment.       Assessment & Plan       Assessment  Assessment details: Today's session was initiated with STM to bilateral UT, addressing muscle tightness. Therapeutic exercises were performed for improved cervical ROM, scapular stability, and strength. Rest breaks were provided as needed. Today's session concluded with cryotherapy to bilateral UT, addressing pain and inflammation, with no skin irritation observed. The patient reported no change in pre/post pain.    Plan  Plan details: Progress as indicated to achieve max function.          Timed:         Manual Therapy:    10     mins  39649;     Therapeutic Exercise:    30     mins  34445;     Neuromuscular Linda:        mins  84544;    Therapeutic Activity:          mins  36425;     Gait Training:           mins  21222;     Ultrasound:          mins  68153;    Ionto                                   mins   10680  Self Care                            mins   33407  Canalith Repos         mins 11840      Un-Timed:  Electrical Stimulation:         mins  38176 ( );  Dry Needling          mins self-pay  Traction          mins 88942      Timed Treatment:   40   mins   Total Treatment:     48   mins (8 minutes cryotherapy)    Ashley Claudene Dalton, PT  [place] License: [id number]      Electronically signed by Ashley Claudene Dalton, PT, 25, 8:21 AM EST

## 2025-02-18 ENCOUNTER — HOSPITAL ENCOUNTER (OUTPATIENT)
Dept: PHYSICAL THERAPY | Facility: HOSPITAL | Age: 75
Setting detail: THERAPIES SERIES
Discharge: HOME | End: 2025-02-18
Payer: MEDICARE

## 2025-02-18 DIAGNOSIS — M47.812 CERVICAL SPONDYLOSIS WITHOUT MYELOPATHY: Primary | ICD-10-CM

## 2025-02-18 PROCEDURE — 97140 MANUAL THERAPY 1/> REGIONS: CPT | Performed by: PHYSICAL THERAPIST

## 2025-02-18 NOTE — THERAPY TREATMENT NOTE
Physical Therapy Daily Treatment Note      Patient: Julieta Frey [facility]   : 1950  Referring practitioner: Rebecca Vanegas PA-C  Date of Initial Visit: Type: THERAPY  Episode: Neck pain  Today's Date: 2025  Patient seen for 3 sessions       Visit Diagnoses:    ICD-10-CM ICD-9-CM   1. Cervical spondylosis without myelopathy  M47.812 721.0       Subjective Evaluation    History of Present Illness    Subjective comment: Pt reports 9/10 neck pain prior to today's session.Pain  Current pain ratin         Objective   See Exercise, Manual, and Modality Logs for complete treatment.       Assessment & Plan       Assessment  Assessment details: Today's session was initiated with STM to bilateral UT, addressing muscle tightness. Therapeutic exercises were performed for improved cervical ROM, scapular stability, and cervical strength. Cervical AROM limitations were observed during today's session. Today's session concluded with cryotherapy to bilateral UT, addressing pain and inflammation, with no skin irritation observed. The patient reported 9/10 cervical pain following today's session.    Plan  Plan details: Progress as indicated to achieve max function.          Timed:         Manual Therapy:    18     mins  89946;     Therapeutic Exercise:    28     mins  48741;   (No charge due to shared minutes)  Neuromuscular Linda:        mins  52102;    Therapeutic Activity:          mins  91295;     Gait Training:           mins  85752;     Ultrasound:          mins  79680;    Ionto                                   mins   78974  Self Care                            mins   93234  Canalith Repos         mins 23075      Un-Timed:  Electrical Stimulation:         mins  48184 (MC );  Dry Needling          mins self-pay  Traction          mins 88074      Timed Treatment:   46   mins   Total Treatment:     54   mins (8 minutes cryotherapy)    Ashley Claudene Dalton, PT  [place] License:  [id number]      Electronically signed by Ashley Claudene Dalton, PT, 02/18/25, 8:16 AM EST

## 2025-02-20 ENCOUNTER — APPOINTMENT (OUTPATIENT)
Dept: PHYSICAL THERAPY | Facility: HOSPITAL | Age: 75
End: 2025-02-20
Payer: MEDICARE

## 2025-02-26 ENCOUNTER — HOSPITAL ENCOUNTER (OUTPATIENT)
Facility: HOSPITAL | Age: 75
Discharge: HOME | End: 2025-02-26
Admitting: NURSE PRACTITIONER
Payer: MEDICARE

## 2025-02-26 DIAGNOSIS — Z13.820 ENCOUNTER FOR OSTEOPOROSIS SCREENING IN ASYMPTOMATIC POSTMENOPAUSAL PATIENT: ICD-10-CM

## 2025-02-26 DIAGNOSIS — Z78.0 ENCOUNTER FOR OSTEOPOROSIS SCREENING IN ASYMPTOMATIC POSTMENOPAUSAL PATIENT: ICD-10-CM

## 2025-02-26 PROCEDURE — 77080 DXA BONE DENSITY AXIAL: CPT | Performed by: RADIOLOGY

## 2025-02-26 PROCEDURE — 77080 DXA BONE DENSITY AXIAL: CPT

## 2025-03-06 ENCOUNTER — HOSPITAL ENCOUNTER (OUTPATIENT)
Dept: GENERAL RADIOLOGY | Facility: HOSPITAL | Age: 75
Discharge: HOME | End: 2025-03-06
Payer: MEDICARE

## 2025-03-06 ENCOUNTER — TRANSCRIBE ORDERS (OUTPATIENT)
Dept: ADMINISTRATIVE | Facility: HOSPITAL | Age: 75
End: 2025-03-06
Payer: MEDICARE

## 2025-03-06 DIAGNOSIS — M25.561 RIGHT KNEE PAIN, UNSPECIFIED CHRONICITY: Primary | ICD-10-CM

## 2025-03-06 DIAGNOSIS — M25.561 RIGHT KNEE PAIN, UNSPECIFIED CHRONICITY: ICD-10-CM

## 2025-03-06 PROCEDURE — 73562 X-RAY EXAM OF KNEE 3: CPT

## 2025-04-01 ENCOUNTER — OFFICE VISIT (OUTPATIENT)
Dept: ORTHOPEDIC SURGERY | Facility: CLINIC | Age: 75
End: 2025-04-01
Payer: MEDICARE

## 2025-04-01 VITALS
SYSTOLIC BLOOD PRESSURE: 152 MMHG | BODY MASS INDEX: 39.08 KG/M2 | DIASTOLIC BLOOD PRESSURE: 88 MMHG | HEIGHT: 61 IN | WEIGHT: 207 LBS

## 2025-04-01 DIAGNOSIS — M17.11 PRIMARY OSTEOARTHRITIS OF RIGHT KNEE: Primary | ICD-10-CM

## 2025-04-01 RX ORDER — LIDOCAINE HYDROCHLORIDE 10 MG/ML
3 INJECTION, SOLUTION EPIDURAL; INFILTRATION; INTRACAUDAL; PERINEURAL
Status: COMPLETED | OUTPATIENT
Start: 2025-04-01 | End: 2025-04-01

## 2025-04-01 RX ORDER — TRIAMCINOLONE ACETONIDE 40 MG/ML
80 INJECTION, SUSPENSION INTRA-ARTICULAR; INTRAMUSCULAR
Status: COMPLETED | OUTPATIENT
Start: 2025-04-01 | End: 2025-04-01

## 2025-04-01 RX ORDER — BUPIVACAINE HYDROCHLORIDE 2.5 MG/ML
3 INJECTION, SOLUTION EPIDURAL; INFILTRATION; INTRACAUDAL; PERINEURAL
Status: COMPLETED | OUTPATIENT
Start: 2025-04-01 | End: 2025-04-01

## 2025-04-01 RX ADMIN — BUPIVACAINE HYDROCHLORIDE 3 ML: 2.5 INJECTION, SOLUTION EPIDURAL; INFILTRATION; INTRACAUDAL; PERINEURAL at 11:21

## 2025-04-01 RX ADMIN — LIDOCAINE HYDROCHLORIDE 3 ML: 10 INJECTION, SOLUTION EPIDURAL; INFILTRATION; INTRACAUDAL; PERINEURAL at 11:21

## 2025-04-01 RX ADMIN — TRIAMCINOLONE ACETONIDE 80 MG: 40 INJECTION, SUSPENSION INTRA-ARTICULAR; INTRAMUSCULAR at 11:21
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
## 2025-04-01 NOTE — PROGRESS NOTES
Procedure   - Large Joint Arthrocentesis: R knee on 4/1/2025 11:21 AM  Indications: pain  Details: 21 G needle, superolateral approach  Medications: 3 mL bupivacaine (PF) 0.25 %; 3 mL lidocaine PF 1% 1 %; 80 mg triamcinolone acetonide 40 MG/ML  Outcome: tolerated well, no immediate complications  Procedure, treatment alternatives, risks and benefits explained, specific risks discussed. Consent was given by the patient. Immediately prior to procedure a time out was called to verify the correct patient, procedure, equipment, support staff and site/side marked as required. Patient was prepped and draped in the usual sterile fashion.

## 2025-04-22 ENCOUNTER — OFFICE VISIT (OUTPATIENT)
Dept: NEUROSURGERY | Facility: CLINIC | Age: 75
End: 2025-04-22
Payer: MEDICARE

## 2025-04-22 ENCOUNTER — HOSPITAL ENCOUNTER (OUTPATIENT)
Dept: NEUROLOGY | Facility: HOSPITAL | Age: 75
Discharge: HOME | End: 2025-04-22
Admitting: PHYSICIAN ASSISTANT
Payer: MEDICARE

## 2025-04-22 VITALS
SYSTOLIC BLOOD PRESSURE: 134 MMHG | HEIGHT: 60 IN | DIASTOLIC BLOOD PRESSURE: 60 MMHG | TEMPERATURE: 98.2 F | WEIGHT: 211.1 LBS | BODY MASS INDEX: 41.44 KG/M2

## 2025-04-22 DIAGNOSIS — M47.812 CERVICAL SPONDYLOSIS WITHOUT MYELOPATHY: Primary | ICD-10-CM

## 2025-04-22 DIAGNOSIS — M47.812 CERVICAL SPONDYLOSIS WITHOUT MYELOPATHY: ICD-10-CM

## 2025-04-22 PROCEDURE — 3078F DIAST BP <80 MM HG: CPT | Performed by: PHYSICIAN ASSISTANT

## 2025-04-22 PROCEDURE — 95886 MUSC TEST DONE W/N TEST COMP: CPT

## 2025-04-22 PROCEDURE — 1159F MED LIST DOCD IN RCRD: CPT | Performed by: PHYSICIAN ASSISTANT

## 2025-04-22 PROCEDURE — 99214 OFFICE O/P EST MOD 30 MIN: CPT | Performed by: PHYSICIAN ASSISTANT

## 2025-04-22 PROCEDURE — 3075F SYST BP GE 130 - 139MM HG: CPT | Performed by: PHYSICIAN ASSISTANT

## 2025-04-22 PROCEDURE — 95910 NRV CNDJ TEST 7-8 STUDIES: CPT

## 2025-04-22 PROCEDURE — 1160F RVW MEDS BY RX/DR IN RCRD: CPT | Performed by: PHYSICIAN ASSISTANT
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
## 2025-05-05 ENCOUNTER — TELEPHONE (OUTPATIENT)
Dept: NEUROSURGERY | Facility: CLINIC | Age: 75
End: 2025-05-05
Payer: MEDICARE

## 2025-05-05 NOTE — TELEPHONE ENCOUNTER
Caller: SUMMER    Relationship: [facility]     Best call back number: [phone]    What orders are you requesting (i.e. lab or imaging): SCRIPT FOR BI-LATERAL HAND THERAPY     In what timeframe would the patient need to come in: 2PM TODAY    Where will you receive your lab/imaging services: [facility]     Additional notes: SUMMER CALLED AND STATES SHE SENT OVER  A BLANK SCRIPT FOR THE PT TO START BI-LATERAL HAND THERAPY AT HER 2PM APPT TODAY

## 2025-06-05 ENCOUNTER — TELEMEDICINE (OUTPATIENT)
Dept: NEUROSURGERY | Facility: CLINIC | Age: 75
End: 2025-06-05
Payer: MEDICARE

## 2025-06-05 DIAGNOSIS — M54.2 NECK PAIN, CHRONIC: ICD-10-CM

## 2025-06-05 DIAGNOSIS — G89.29 NECK PAIN, CHRONIC: ICD-10-CM

## 2025-06-05 DIAGNOSIS — M47.22 CERVICAL SPONDYLOSIS WITH RADICULOPATHY: Primary | ICD-10-CM

## 2025-06-05 PROCEDURE — 99213 OFFICE O/P EST LOW 20 MIN: CPT | Performed by: PHYSICIAN ASSISTANT
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
## 2025-06-20 LAB
MDC_IDC_MSMT_BATTERY_REMAINING_LONGEVITY: 107 MO
MDC_IDC_MSMT_BATTERY_REMAINING_PERCENTAGE: 89 %
MDC_IDC_MSMT_BATTERY_RRT_TRIGGER: 2.6
MDC_IDC_MSMT_BATTERY_STATUS: NORMAL
MDC_IDC_MSMT_BATTERY_VOLTAGE: 3.02
MDC_IDC_MSMT_LEADCHNL_RA_DTM: NORMAL
MDC_IDC_MSMT_LEADCHNL_RA_IMPEDANCE_VALUE: 330
MDC_IDC_MSMT_LEADCHNL_RA_PACING_THRESHOLD_AMPLITUDE: 0.88
MDC_IDC_MSMT_LEADCHNL_RA_PACING_THRESHOLD_POLARITY: NORMAL
MDC_IDC_MSMT_LEADCHNL_RA_PACING_THRESHOLD_PULSEWIDTH: 0.5
MDC_IDC_MSMT_LEADCHNL_RA_SENSING_INTR_AMPL: 3.4
MDC_IDC_MSMT_LEADCHNL_RV_DTM: NORMAL
MDC_IDC_MSMT_LEADCHNL_RV_IMPEDANCE_VALUE: 580
MDC_IDC_MSMT_LEADCHNL_RV_PACING_THRESHOLD_AMPLITUDE: 0.5
MDC_IDC_MSMT_LEADCHNL_RV_PACING_THRESHOLD_POLARITY: NORMAL
MDC_IDC_MSMT_LEADCHNL_RV_PACING_THRESHOLD_PULSEWIDTH: 0.5
MDC_IDC_MSMT_LEADCHNL_RV_SENSING_INTR_AMPL: 12
MDC_IDC_PG_IMPLANT_DTM: NORMAL
MDC_IDC_PG_MFG: NORMAL
MDC_IDC_PG_MODEL: NORMAL
MDC_IDC_PG_SERIAL: NORMAL
MDC_IDC_PG_TYPE: NORMAL
MDC_IDC_SESS_DTM: NORMAL
MDC_IDC_SESS_TYPE: NORMAL
MDC_IDC_SET_BRADY_AT_MODE_SWITCH_RATE: 160
MDC_IDC_SET_BRADY_LOWRATE: 60
MDC_IDC_SET_BRADY_MAX_SENSOR_RATE: 120
MDC_IDC_SET_BRADY_MAX_TRACKING_RATE: 120
MDC_IDC_SET_BRADY_MODE: NORMAL
MDC_IDC_SET_BRADY_PAV_DELAY: 200
MDC_IDC_SET_BRADY_SAV_DELAY: 200
MDC_IDC_SET_LEADCHNL_RA_PACING_AMPLITUDE: 2.5
MDC_IDC_SET_LEADCHNL_RA_PACING_POLARITY: NORMAL
MDC_IDC_SET_LEADCHNL_RA_PACING_PULSEWIDTH: 0.5
MDC_IDC_SET_LEADCHNL_RA_SENSING_POLARITY: NORMAL
MDC_IDC_SET_LEADCHNL_RA_SENSING_SENSITIVITY: 0.5
MDC_IDC_SET_LEADCHNL_RV_PACING_AMPLITUDE: 0.75
MDC_IDC_SET_LEADCHNL_RV_PACING_POLARITY: NORMAL
MDC_IDC_SET_LEADCHNL_RV_PACING_PULSEWIDTH: 0.5
MDC_IDC_SET_LEADCHNL_RV_SENSING_POLARITY: NORMAL
MDC_IDC_SET_LEADCHNL_RV_SENSING_SENSITIVITY: 2
MDC_IDC_STAT_AT_BURDEN_PERCENT: 1
MDC_IDC_STAT_BRADY_RA_PERCENT_PACED: 20
MDC_IDC_STAT_BRADY_RV_PERCENT_PACED: 1

## 2025-07-09 ENCOUNTER — INFUSION (OUTPATIENT)
Dept: ONCOLOGY | Facility: HOSPITAL | Age: 75
End: 2025-07-09
Payer: MEDICARE

## 2025-07-09 VITALS
TEMPERATURE: 98.1 F | WEIGHT: 212 LBS | SYSTOLIC BLOOD PRESSURE: 154 MMHG | HEART RATE: 121 BPM | BODY MASS INDEX: 41.4 KG/M2 | DIASTOLIC BLOOD PRESSURE: 59 MMHG | RESPIRATION RATE: 18 BRPM | OXYGEN SATURATION: 96 %

## 2025-07-09 DIAGNOSIS — M81.0 OSTEOPOROSIS, UNSPECIFIED OSTEOPOROSIS TYPE, UNSPECIFIED PATHOLOGICAL FRACTURE PRESENCE: Primary | ICD-10-CM

## 2025-07-09 LAB
ALBUMIN SERPL-MCNC: 3.8 G/DL (ref 3.5–5.2)
ALBUMIN/GLOB SERPL: 1.2 G/DL
ALP SERPL-CCNC: 103 U/L (ref 39–117)
ALT SERPL W P-5'-P-CCNC: 10 U/L (ref 1–33)
ANION GAP SERPL CALCULATED.3IONS-SCNC: 16 MMOL/L (ref 5–15)
AST SERPL-CCNC: 21 U/L (ref 1–32)
BILIRUB SERPL-MCNC: 0.7 MG/DL (ref 0–1.2)
BUN SERPL-MCNC: 11.5 MG/DL (ref 8–23)
BUN/CREAT SERPL: 13.7 (ref 7–25)
CALCIUM SPEC-SCNC: 9.8 MG/DL (ref 8.6–10.5)
CHLORIDE SERPL-SCNC: 103 MMOL/L (ref 98–107)
CO2 SERPL-SCNC: 19 MMOL/L (ref 22–29)
CREAT SERPL-MCNC: 0.84 MG/DL (ref 0.57–1)
EGFRCR SERPLBLD CKD-EPI 2021: 72.6 ML/MIN/1.73
GLOBULIN UR ELPH-MCNC: 3.1 GM/DL
GLUCOSE SERPL-MCNC: 113 MG/DL (ref 65–99)
POTASSIUM SERPL-SCNC: 4.1 MMOL/L (ref 3.5–5.2)
PROT SERPL-MCNC: 6.9 G/DL (ref 6–8.5)
SODIUM SERPL-SCNC: 138 MMOL/L (ref 136–145)

## 2025-07-09 PROCEDURE — 25010000002 DENOSUMAB 60 MG/ML SOLUTION PREFILLED SYRINGE: Performed by: NURSE PRACTITIONER

## 2025-07-09 PROCEDURE — 96372 THER/PROPH/DIAG INJ SC/IM: CPT

## 2025-07-09 PROCEDURE — 80053 COMPREHEN METABOLIC PANEL: CPT

## 2025-07-09 RX ADMIN — DENOSUMAB 60 MG: 60 INJECTION SUBCUTANEOUS at 16:16

## 2025-07-29 ENCOUNTER — OFFICE VISIT (OUTPATIENT)
Dept: PULMONOLOGY | Facility: CLINIC | Age: 75
End: 2025-07-29
Payer: MEDICARE

## 2025-07-29 VITALS
TEMPERATURE: 97.6 F | HEIGHT: 60 IN | DIASTOLIC BLOOD PRESSURE: 76 MMHG | OXYGEN SATURATION: 90 % | HEART RATE: 78 BPM | SYSTOLIC BLOOD PRESSURE: 124 MMHG | BODY MASS INDEX: 38.83 KG/M2 | WEIGHT: 197.8 LBS

## 2025-07-29 DIAGNOSIS — J44.9 CHRONIC OBSTRUCTIVE PULMONARY DISEASE, UNSPECIFIED COPD TYPE: Primary | ICD-10-CM

## 2025-07-29 DIAGNOSIS — G47.33 OSA (OBSTRUCTIVE SLEEP APNEA): ICD-10-CM

## 2025-07-29 PROCEDURE — 3078F DIAST BP <80 MM HG: CPT | Performed by: NURSE PRACTITIONER

## 2025-07-29 PROCEDURE — 1160F RVW MEDS BY RX/DR IN RCRD: CPT | Performed by: NURSE PRACTITIONER

## 2025-07-29 PROCEDURE — G2211 COMPLEX E/M VISIT ADD ON: HCPCS | Performed by: NURSE PRACTITIONER

## 2025-07-29 PROCEDURE — 3074F SYST BP LT 130 MM HG: CPT | Performed by: NURSE PRACTITIONER

## 2025-07-29 PROCEDURE — 1159F MED LIST DOCD IN RCRD: CPT | Performed by: NURSE PRACTITIONER

## 2025-07-29 PROCEDURE — 99214 OFFICE O/P EST MOD 30 MIN: CPT | Performed by: NURSE PRACTITIONER

## 2025-07-29 RX ORDER — MONTELUKAST SODIUM 10 MG/1
10 TABLET ORAL NIGHTLY
Qty: 30 TABLET | Refills: 5 | Status: SHIPPED | OUTPATIENT
Start: 2025-07-29

## 2025-07-29 NOTE — PROGRESS NOTES
"Chief Complaint  Sleep Apnea    Subjective          Julieta Tk Holt presents to [facility] for   History of Present Illness    Ms. Holt is a 74 year old female with a medical history significant for hypertension, arthritis, COPD, depression, and seizures.    She presets today for follow up on sleep apnea and COPD. She states that she has been doing well since her last visit.  She reports that her breathing is at baseline.  She is currently taking Trelegy once daily and albuterol inhaler as needed.  She is also taking allegra and singulair for allergies.  She has not been using her cpap as she pulls it off at night.    Objective   Vital Signs:   /76   Pulse 78   Temp 97.6 °F (36.4 °C)   Ht 152.4 cm (60\")   Wt 89.7 kg (197 lb 12.8 oz)   SpO2 90%   BMI 38.63 kg/m²         Physical Exam    GENERAL APPEARANCE: Well developed, well nourished, alert and cooperative, and appears to be in no acute distress.    HEAD: normocephalic. Atraumatic.    EYES: PERRL, EOMI. Vision is grossly intact.    THROAT: Oral cavity and pharynx normal. No inflammation, swelling, exudate, or lesions.     NECK: Neck supple.  No thyromegaly.    CARDIAC: Normal S1 and S2. No S3, S4 or murmurs. Rhythm is regular.     RESPIRATORY:Bilateral air entry positive. . No wheezing, crackles or rhonchi noted.    GI: Positive bowel sounds. Soft, nondistended, nontender.     MUSCULOSKELETAL: No significant deformity or joint abnormality. No edema. Peripheral pulses intact. No varicosities.    NEUROLOGICAL: Strength and sensation symmetric and intact throughout.     PSYCHIATRIC: The mental examination revealed the patient was oriented to person, place, and time.       Estimated body mass index is 38.63 kg/m² as calculated from the following:    Height as of this encounter: 152.4 cm (60\").    Weight as of this encounter: 89.7 kg (197 lb 12.8 oz).        Result Review :   The " "following data was reviewed by: MARY Chu on 07/29/2025:  Common labs          1/8/2025    14:21 7/9/2025    15:20   Common Labs   Glucose 98  113    BUN 23  11.5    Creatinine 0.96  0.84    Sodium 144  138    Potassium 3.4  4.1    Chloride 104  103    Calcium 10.1  9.8    Albumin 4.3  3.8    Total Bilirubin 0.4  0.7    Alkaline Phosphatase 102  103    AST (SGOT) 16  21    ALT (SGPT) 12  10           PFT:12/1/17  Normal spirometry with no significant broncho-dilator responsiveness and supervision.  Lung volume shows mild restrictive lung disease which is likely due to desire as flow-volume loop does not show any restriction.  DLCO is normal.  Flow volume loop is normal               Low dose lung cancer screening:NA     Previous chest imaging:NA     Alpha-1 antitrypsin screening:NA     STOP-Bang Score:   NA  [place] Sleepiness Scale:   NA      ABG:    pH No results found for: \"PHART\"   pO2 No results found for: \"PO2ART\"   pCO2 No results found for: \"ACI9LME\"   HCO3 No results found for: \"DBA6LWR\"                      Assessment and Plan    Problem List Items Addressed This Visit    None  Visit Diagnoses         Chronic obstructive pulmonary disease, unspecified COPD type    -  Primary    Relevant Medications    montelukast (SINGULAIR) 10 MG tablet      CHRISTIAN (obstructive sleep apnea)                Julieta Frey [facility]  reports that she quit smoking about 21 years ago. Her smoking use included cigarettes. She started smoking about 51 years ago. She has a 45 pack-year smoking history. She has been exposed to tobacco smoke. She has never used smokeless tobacco.        She  is not using her cpap as she pulls it off at night.   Patient was educated on positive airway pressure treatment.  As per CMS guidelines, more than 4 hours on 70% of observed nights is considered adherence. Patient was strongly encouraged to use CPAP as much as possible during sleep as more CPAP use is equal to more benefit. " Use of heated humidification in positive airway pressure treatment to improve the adherence to the device.  In case of claustrophobia, we will provide the patient cognitive behavioral therapy and desensitization. Oral appliances use will be discussed with the patient in case of mild to moderate sleep apnea or if the patient with severe disease fail positive airway pressure treatment.       The patient was extensively educated on the consequences of untreated obstructive sleep apnea namely cardiovascular/metabolic disorder, neurocognitive deficit, daytime sleepiness, motor vehicle accidents, depression, mood disorders and reduced quality of life.  At the end of conversation, the patient voices understanding of the disease process and treatment modality.  Patient also understands the risk of untreated obstructive sleep apnea and benefit benefits of the treatment.    Counseling time was greater than 10 minutes.      Continue Trelegy once daily.  Continue albuterol inhaler as needed.  Continue atrovent nebs as needed.  Continue Singulair and allegra.          Follow Up   Return in about 6 months (around 1/29/2026).  Patient was given instructions and counseling regarding her condition or for health maintenance advice. Please see specific information pulled into the AVS if appropriate.

## 2025-07-31 RX ORDER — FLUTICASONE FUROATE, UMECLIDINIUM BROMIDE AND VILANTEROL TRIFENATATE 200; 62.5; 25 UG/1; UG/1; UG/1
1 POWDER RESPIRATORY (INHALATION)
Qty: 60 EACH | Refills: 5 | Status: SHIPPED | OUTPATIENT
Start: 2025-07-31

## 2025-08-03 ENCOUNTER — APPOINTMENT (OUTPATIENT)
Dept: GENERAL RADIOLOGY | Facility: HOSPITAL | Age: 75
End: 2025-08-03
Payer: MEDICARE

## 2025-08-03 ENCOUNTER — HOSPITAL ENCOUNTER (EMERGENCY)
Facility: HOSPITAL | Age: 75
Discharge: HOME | End: 2025-08-03
Payer: MEDICARE

## 2025-08-03 VITALS
HEIGHT: 60 IN | DIASTOLIC BLOOD PRESSURE: 73 MMHG | OXYGEN SATURATION: 92 % | WEIGHT: 197.75 LBS | BODY MASS INDEX: 38.82 KG/M2 | HEART RATE: 115 BPM | RESPIRATION RATE: 20 BRPM | SYSTOLIC BLOOD PRESSURE: 179 MMHG | TEMPERATURE: 98.6 F

## 2025-08-03 DIAGNOSIS — F43.0 ACUTE STRESS REACTION: Primary | ICD-10-CM

## 2025-08-03 LAB
ALBUMIN SERPL-MCNC: 4.1 G/DL (ref 3.5–5.2)
ALBUMIN/GLOB SERPL: 1.6 G/DL
ALP SERPL-CCNC: 97 U/L (ref 39–117)
ALT SERPL W P-5'-P-CCNC: 10 U/L (ref 1–33)
ANION GAP SERPL CALCULATED.3IONS-SCNC: 12.1 MMOL/L (ref 5–15)
AST SERPL-CCNC: 18 U/L (ref 1–32)
BASOPHILS # BLD AUTO: 0.07 10*3/MM3 (ref 0–0.2)
BASOPHILS NFR BLD AUTO: 0.7 % (ref 0–1.5)
BILIRUB SERPL-MCNC: 0.3 MG/DL (ref 0–1.2)
BUN SERPL-MCNC: 19.7 MG/DL (ref 8–23)
BUN/CREAT SERPL: 21.4 (ref 7–25)
CALCIUM SPEC-SCNC: 9.5 MG/DL (ref 8.6–10.5)
CHLORIDE SERPL-SCNC: 104 MMOL/L (ref 98–107)
CO2 SERPL-SCNC: 22.9 MMOL/L (ref 22–29)
CREAT SERPL-MCNC: 0.92 MG/DL (ref 0.57–1)
DEPRECATED RDW RBC AUTO: 43.7 FL (ref 37–54)
EGFRCR SERPLBLD CKD-EPI 2021: 65.1 ML/MIN/1.73
EOSINOPHIL # BLD AUTO: 0.26 10*3/MM3 (ref 0–0.4)
EOSINOPHIL NFR BLD AUTO: 2.6 % (ref 0.3–6.2)
ERYTHROCYTE [DISTWIDTH] IN BLOOD BY AUTOMATED COUNT: 12.9 % (ref 12.3–15.4)
GEN 5 1HR TROPONIN T REFLEX: 16 NG/L
GLOBULIN UR ELPH-MCNC: 2.6 GM/DL
GLUCOSE SERPL-MCNC: 115 MG/DL (ref 65–99)
HCT VFR BLD AUTO: 40.2 % (ref 34–46.6)
HGB BLD-MCNC: 12.4 G/DL (ref 12–15.9)
HOLD SPECIMEN: NORMAL
HOLD SPECIMEN: NORMAL
IMM GRANULOCYTES # BLD AUTO: 0.04 10*3/MM3 (ref 0–0.05)
IMM GRANULOCYTES NFR BLD AUTO: 0.4 % (ref 0–0.5)
LYMPHOCYTES # BLD AUTO: 1.76 10*3/MM3 (ref 0.7–3.1)
LYMPHOCYTES NFR BLD AUTO: 17.6 % (ref 19.6–45.3)
MCH RBC QN AUTO: 28.4 PG (ref 26.6–33)
MCHC RBC AUTO-ENTMCNC: 30.8 G/DL (ref 31.5–35.7)
MCV RBC AUTO: 92 FL (ref 79–97)
MONOCYTES # BLD AUTO: 0.77 10*3/MM3 (ref 0.1–0.9)
MONOCYTES NFR BLD AUTO: 7.7 % (ref 5–12)
NEUTROPHILS NFR BLD AUTO: 7.12 10*3/MM3 (ref 1.7–7)
NEUTROPHILS NFR BLD AUTO: 71 % (ref 42.7–76)
NRBC BLD AUTO-RTO: 0 /100 WBC (ref 0–0.2)
NT-PROBNP SERPL-MCNC: 375 PG/ML (ref 0–1800)
PLATELET # BLD AUTO: 245 10*3/MM3 (ref 140–450)
PMV BLD AUTO: 10.6 FL (ref 6–12)
POTASSIUM SERPL-SCNC: 4.2 MMOL/L (ref 3.5–5.2)
PROT SERPL-MCNC: 6.7 G/DL (ref 6–8.5)
RBC # BLD AUTO: 4.37 10*6/MM3 (ref 3.77–5.28)
SODIUM SERPL-SCNC: 139 MMOL/L (ref 136–145)
TROPONIN T NUMERIC DELTA: 3 NG/L
TROPONIN T SERPL HS-MCNC: 13 NG/L
WBC NRBC COR # BLD AUTO: 10.02 10*3/MM3 (ref 3.4–10.8)
WHOLE BLOOD HOLD COAG: NORMAL
WHOLE BLOOD HOLD SPECIMEN: NORMAL

## 2025-08-03 PROCEDURE — 80053 COMPREHEN METABOLIC PANEL: CPT

## 2025-08-03 PROCEDURE — 84484 ASSAY OF TROPONIN QUANT: CPT

## 2025-08-03 PROCEDURE — 83880 ASSAY OF NATRIURETIC PEPTIDE: CPT

## 2025-08-03 PROCEDURE — 99284 EMERGENCY DEPT VISIT MOD MDM: CPT

## 2025-08-03 PROCEDURE — 36415 COLL VENOUS BLD VENIPUNCTURE: CPT

## 2025-08-03 PROCEDURE — 93005 ELECTROCARDIOGRAM TRACING: CPT

## 2025-08-03 PROCEDURE — 85025 COMPLETE CBC W/AUTO DIFF WBC: CPT

## 2025-08-03 PROCEDURE — 71046 X-RAY EXAM CHEST 2 VIEWS: CPT

## 2025-08-03 RX ORDER — SODIUM CHLORIDE 0.9 % (FLUSH) 0.9 %
10 SYRINGE (ML) INJECTION AS NEEDED
Status: DISCONTINUED | OUTPATIENT
Start: 2025-08-03 | End: 2025-08-03 | Stop reason: HOSPADM

## 2025-08-04 LAB
QT INTERVAL: 346 MS
QTC INTERVAL: 491 MS

## 2025-08-04 PROCEDURE — 71046 X-RAY EXAM CHEST 2 VIEWS: CPT | Performed by: RADIOLOGY

## (undated) DEVICE — NDL SPINE 22G 31/2IN BLK

## (undated) DEVICE — BLANKT WARM UPPR/BDY ARM/OUT 57X196CM

## (undated) DEVICE — LEX CATH LAB MINOR: Brand: MEDLINE INDUSTRIES, INC.

## (undated) DEVICE — LIMB HOLDER, WRIST/ANKLE: Brand: DEROYAL

## (undated) DEVICE — HOLDER: Brand: DEROYAL

## (undated) DEVICE — TBG PENCL TELESCP MEGADYNE SMOKE EVAC 10FT

## (undated) DEVICE — INSUFFLATION NEEDLE TO ESTABLISH PNEUMOPERITONEUM.: Brand: INSUFFLATION NEEDLE

## (undated) DEVICE — PATIENT RETURN ELECTRODE, SINGLE-USE, CONTACT QUALITY MONITORING, ADULT, WITH 9FT CORD, FOR PATIENTS WEIGING OVER 33LBS. (15KG): Brand: MEGADYNE

## (undated) DEVICE — PK KN TOTL 10

## (undated) DEVICE — TRAP FLD MINIVAC MEGADYNE 100ML

## (undated) DEVICE — ARM SLING II: Brand: DEROYAL

## (undated) DEVICE — STERILE PVP: Brand: MEDLINE INDUSTRIES, INC.

## (undated) DEVICE — ST CATH CHOLANG WKARLAN/BALN 2LUM 4F 1.25

## (undated) DEVICE — Device

## (undated) DEVICE — INTRO TEAR AWAY/LVD W/SD PRT 6F 13CM

## (undated) DEVICE — SUT PDS 2/0 CT2 27IN VIL PDP333H

## (undated) DEVICE — DRP C/ARM W/BAND W/CLIPS 41X74IN

## (undated) DEVICE — ANTIBACTERIAL UNDYED BRAIDED (POLYGLACTIN 910), SYNTHETIC ABSORBABLE SUTURE: Brand: COATED VICRYL

## (undated) DEVICE — DISPOSABLE TOURNIQUET CUFF SINGLE BLADDER, SINGLE PORT AND LUER LOCK CONNECTOR: Brand: COLOR CUFF

## (undated) DEVICE — TRY EPID SFTY 18G 3.5IN 1T7680

## (undated) DEVICE — UNDERGLV SURG BIOGEL INDICAT PI SZ8 BLU

## (undated) DEVICE — PK KN ARTHSCP 70

## (undated) DEVICE — STRYKER PERFORMANCE SERIES SAGITTAL BLADE: Brand: STRYKER PERFORMANCE SERIES

## (undated) DEVICE — GLV SURG SENSICARE PI ORTHO SZ8 LF STRL

## (undated) DEVICE — BNDG ELAS CO-FLEX SLF ADHR 6IN 5YD LF STRL

## (undated) DEVICE — NEEDLE, QUINCKE 22GX3.5": Brand: MEDLINE INDUSTRIES, INC.

## (undated) DEVICE — DRSNG SURG AQUACEL AG/ADVNTGE 9X25CM 3.5X10IN

## (undated) DEVICE — ADHS SKIN PREMIERPRO EXOFIN TOPICAL HI/VISC .5ML

## (undated) DEVICE — WRP COMPR KN COLD UNIV

## (undated) DEVICE — SUT VIC 1 CTX 36IN OBGYN VCP977H

## (undated) DEVICE — UNDERCAST PADDING: Brand: DEROYAL

## (undated) DEVICE — GLV SURG PREMIERPRO MIC LTX PF SZ8 BRN

## (undated) DEVICE — ADHS LIQ MASTISOL 2/3ML

## (undated) DEVICE — APPL CHLORAPREP HI/LITE 26ML ORNG

## (undated) DEVICE — SYR LUERLOK 30CC

## (undated) DEVICE — GLV SURG SENSICARE PI MIC PF SZ9 LF STRL

## (undated) DEVICE — TB SXN FRAZIER 12F STRL

## (undated) DEVICE — ENCORE® LATEX MICRO SIZE 7, STERILE LATEX POWDER-FREE SURGICAL GLOVE: Brand: ENCORE

## (undated) DEVICE — PAD ARMBRD SURG CONVOL 7.5X20X2IN

## (undated) DEVICE — BNDG ELAS CO-FLEX SLF ADHR 4IN5YD LF STRL

## (undated) DEVICE — ENDOPATH XCEL UNIVERSAL TROCAR STABLILITY SLEEVES: Brand: ENDOPATH XCEL

## (undated) DEVICE — DRSNG SURG AQUACEL AG/ADVNTGE 9X15CM 3.5X6IN

## (undated) DEVICE — TBG PUMP ARTHSCP MAIN AR6400 16FT

## (undated) DEVICE — 2, DISPOSABLE SUCTION/IRRIGATOR WITH DISPOSABLE TIP: Brand: STRYKEFLOW

## (undated) DEVICE — BLD CUT FORMLA AGGR PLS 4.0MM

## (undated) DEVICE — DYONICS 2.9 MM BARREL BURRS, 7 CM                                    LENGTH, PURPLE, PACKAGED 6 PER BOX, STERILE

## (undated) DEVICE — 3M™ IOBAN™ 2 ANTIMICROBIAL INCISE DRAPE 6650EZ: Brand: IOBAN™ 2

## (undated) DEVICE — KT PUMP INFUBLOCK MDL 2100 PMKITSOLIS

## (undated) DEVICE — PIN HOLD TEMP NOHEAD FLUT 1/8X3.5IN

## (undated) DEVICE — BNDG ELAS W/CLIP 6IN 10YD LF STRL

## (undated) DEVICE — DRSNG WND GZ CURAD OIL EMULSION 3X8IN LF STRL 1PK

## (undated) DEVICE — ENDOPATH XCEL BLADELESS TROCARS WITH STABILITY SLEEVES: Brand: ENDOPATH XCEL

## (undated) DEVICE — NDL HYPO ECLPS SFTY 22G 1 1/2IN

## (undated) DEVICE — ENDOCUT SCISSOR TIP, DISPOSABLE: Brand: RENEW

## (undated) DEVICE — SUT PROLN 4/0 PS2 18IN 8682G

## (undated) DEVICE — NDL HYPO ECLPS SFTY 18G 1 1/2IN

## (undated) DEVICE — SUT MONOCRYL PLS ANTIB UND 3/0  PS1 27IN

## (undated) DEVICE — 8 FOOT DISPOSABLE BI-VENTRICULAR PACING CABLE WITH SAFE CONNECT / ALLIGATOR CLIP

## (undated) DEVICE — SUT ETHLN 3-0 FS118IN 663H

## (undated) DEVICE — PK LAP GEN 70